# Patient Record
Sex: FEMALE | Race: BLACK OR AFRICAN AMERICAN | Employment: OTHER | URBAN - METROPOLITAN AREA
[De-identification: names, ages, dates, MRNs, and addresses within clinical notes are randomized per-mention and may not be internally consistent; named-entity substitution may affect disease eponyms.]

---

## 2017-01-09 DIAGNOSIS — I25.10 CORONARY ARTERY DISEASE INVOLVING NATIVE CORONARY ARTERY OF NATIVE HEART WITHOUT ANGINA PECTORIS: Primary | ICD-10-CM

## 2017-02-20 ENCOUNTER — TELEPHONE (OUTPATIENT)
Dept: CARDIOLOGY | Facility: CLINIC | Age: 52
End: 2017-02-20

## 2017-02-20 NOTE — TELEPHONE ENCOUNTER
"Patient called asking for alternative for   Amlodipine-Olmesartan (DAYAMI) 5-40 MG TABS Take 1 tablet by mouth daily   Patient contacted PMD office and per patient she was \"told to see cardiology first to have alternative recommended for her BP. \"   Possible recommendation per patient could be valsartan or candesartan.   Patient will be seeing PMD tomorrow  Patient has annual OV scheduled 4-3-17 with Dr. Tobias.   Will message Dr. Tobias.   "

## 2017-02-22 ENCOUNTER — TELEPHONE (OUTPATIENT)
Dept: CARDIOLOGY | Facility: CLINIC | Age: 52
End: 2017-02-22

## 2017-02-22 DIAGNOSIS — I10 BENIGN ESSENTIAL HYPERTENSION: Primary | ICD-10-CM

## 2017-02-22 RX ORDER — AMLODIPINE BESYLATE 5 MG/1
5 TABLET ORAL DAILY
Qty: 30 TABLET | Refills: 3 | Status: SHIPPED | OUTPATIENT
Start: 2017-02-22 | End: 2017-04-18

## 2017-02-22 RX ORDER — LOSARTAN POTASSIUM 100 MG/1
100 TABLET ORAL DAILY
Qty: 30 TABLET | Refills: 3 | Status: SHIPPED | OUTPATIENT
Start: 2017-02-22 | End: 2017-06-27

## 2017-02-22 NOTE — TELEPHONE ENCOUNTER
"Message from Dr. Tobias \"Lets switch her to amlodipine 5 MG daily and losartan 100 MG daily\"    Contacted patient to review Dr. Tobias's recommendation for alternative medications for Elliot. Patient needed an alternative as insurance would not cover the elliot. Patient preferred 30 days for quantity and would like to move her OV from 2/23/17 to a 30 day visit to review BP management.  Message to scheduling to contact patient for OV.  "

## 2017-03-30 ENCOUNTER — PRE VISIT (OUTPATIENT)
Dept: CARDIOLOGY | Facility: CLINIC | Age: 52
End: 2017-03-30

## 2017-04-03 ENCOUNTER — APPOINTMENT (OUTPATIENT)
Dept: GENERAL RADIOLOGY | Facility: CLINIC | Age: 52
End: 2017-04-03
Attending: EMERGENCY MEDICINE
Payer: MEDICARE

## 2017-04-03 ENCOUNTER — HOSPITAL ENCOUNTER (EMERGENCY)
Facility: CLINIC | Age: 52
Discharge: HOME | End: 2017-04-03
Attending: EMERGENCY MEDICINE | Admitting: EMERGENCY MEDICINE
Payer: MEDICARE

## 2017-04-03 VITALS
RESPIRATION RATE: 16 BRPM | WEIGHT: 203 LBS | HEART RATE: 75 BPM | OXYGEN SATURATION: 97 % | TEMPERATURE: 99.4 F | SYSTOLIC BLOOD PRESSURE: 140 MMHG | DIASTOLIC BLOOD PRESSURE: 88 MMHG | BODY MASS INDEX: 34.66 KG/M2 | HEIGHT: 64 IN

## 2017-04-03 DIAGNOSIS — R07.89 ATYPICAL CHEST PAIN: ICD-10-CM

## 2017-04-03 DIAGNOSIS — K21.9 GASTROESOPHAGEAL REFLUX DISEASE WITHOUT ESOPHAGITIS: ICD-10-CM

## 2017-04-03 LAB
ANION GAP SERPL CALCULATED.3IONS-SCNC: 8 MMOL/L (ref 3–14)
BASOPHILS # BLD AUTO: 0 10E9/L (ref 0–0.2)
BASOPHILS NFR BLD AUTO: 0.5 %
BUN SERPL-MCNC: 7 MG/DL (ref 7–30)
CALCIUM SERPL-MCNC: 9.2 MG/DL (ref 8.5–10.1)
CHLORIDE SERPL-SCNC: 105 MMOL/L (ref 94–109)
CO2 SERPL-SCNC: 27 MMOL/L (ref 20–32)
CREAT SERPL-MCNC: 0.71 MG/DL (ref 0.52–1.04)
DIFFERENTIAL METHOD BLD: NORMAL
EOSINOPHIL # BLD AUTO: 0.5 10E9/L (ref 0–0.7)
EOSINOPHIL NFR BLD AUTO: 6 %
ERYTHROCYTE [DISTWIDTH] IN BLOOD BY AUTOMATED COUNT: 12.7 % (ref 10–15)
GFR SERPL CREATININE-BSD FRML MDRD: 86 ML/MIN/1.7M2
GLUCOSE SERPL-MCNC: 91 MG/DL (ref 70–99)
HCT VFR BLD AUTO: 42.1 % (ref 35–47)
HGB BLD-MCNC: 14.1 G/DL (ref 11.7–15.7)
IMM GRANULOCYTES # BLD: 0 10E9/L (ref 0–0.4)
IMM GRANULOCYTES NFR BLD: 0.3 %
INTERPRETATION ECG - MUSE: NORMAL
LYMPHOCYTES # BLD AUTO: 2.6 10E9/L (ref 0.8–5.3)
LYMPHOCYTES NFR BLD AUTO: 34.8 %
MCH RBC QN AUTO: 28.4 PG (ref 26.5–33)
MCHC RBC AUTO-ENTMCNC: 33.5 G/DL (ref 31.5–36.5)
MCV RBC AUTO: 85 FL (ref 78–100)
MONOCYTES # BLD AUTO: 0.6 10E9/L (ref 0–1.3)
MONOCYTES NFR BLD AUTO: 7.3 %
NEUTROPHILS # BLD AUTO: 3.8 10E9/L (ref 1.6–8.3)
NEUTROPHILS NFR BLD AUTO: 51.1 %
NRBC # BLD AUTO: 0 10*3/UL
NRBC BLD AUTO-RTO: 0 /100
NT-PROBNP SERPL-MCNC: 43 PG/ML (ref 0–900)
PLATELET # BLD AUTO: 315 10E9/L (ref 150–450)
POTASSIUM SERPL-SCNC: 3.6 MMOL/L (ref 3.4–5.3)
RBC # BLD AUTO: 4.97 10E12/L (ref 3.8–5.2)
SODIUM SERPL-SCNC: 140 MMOL/L (ref 133–144)
TROPONIN I SERPL-MCNC: NORMAL UG/L (ref 0–0.04)
WBC # BLD AUTO: 7.5 10E9/L (ref 4–11)

## 2017-04-03 PROCEDURE — 25000132 ZZH RX MED GY IP 250 OP 250 PS 637: Mod: GY | Performed by: EMERGENCY MEDICINE

## 2017-04-03 PROCEDURE — 83880 ASSAY OF NATRIURETIC PEPTIDE: CPT | Performed by: EMERGENCY MEDICINE

## 2017-04-03 PROCEDURE — 99285 EMERGENCY DEPT VISIT HI MDM: CPT | Mod: 25

## 2017-04-03 PROCEDURE — 84484 ASSAY OF TROPONIN QUANT: CPT | Performed by: EMERGENCY MEDICINE

## 2017-04-03 PROCEDURE — 25000125 ZZHC RX 250: Performed by: EMERGENCY MEDICINE

## 2017-04-03 PROCEDURE — 85025 COMPLETE CBC W/AUTO DIFF WBC: CPT | Performed by: EMERGENCY MEDICINE

## 2017-04-03 PROCEDURE — A9270 NON-COVERED ITEM OR SERVICE: HCPCS | Mod: GY | Performed by: EMERGENCY MEDICINE

## 2017-04-03 PROCEDURE — 71020 XR CHEST 2 VW: CPT

## 2017-04-03 PROCEDURE — 80048 BASIC METABOLIC PNL TOTAL CA: CPT | Performed by: EMERGENCY MEDICINE

## 2017-04-03 PROCEDURE — 93005 ELECTROCARDIOGRAM TRACING: CPT

## 2017-04-03 RX ORDER — NITROGLYCERIN 0.4 MG/1
0.4 TABLET SUBLINGUAL
Status: DISCONTINUED | OUTPATIENT
Start: 2017-04-03 | End: 2017-04-03 | Stop reason: HOSPADM

## 2017-04-03 RX ORDER — LIDOCAINE 40 MG/G
CREAM TOPICAL
Status: DISCONTINUED | OUTPATIENT
Start: 2017-04-03 | End: 2017-04-03 | Stop reason: HOSPADM

## 2017-04-03 RX ORDER — ASPIRIN 81 MG/1
324 TABLET, CHEWABLE ORAL ONCE
Status: COMPLETED | OUTPATIENT
Start: 2017-04-03 | End: 2017-04-03

## 2017-04-03 RX ORDER — ALUMINA, MAGNESIA, AND SIMETHICONE 2400; 2400; 240 MG/30ML; MG/30ML; MG/30ML
15 SUSPENSION ORAL ONCE
Status: COMPLETED | OUTPATIENT
Start: 2017-04-03 | End: 2017-04-03

## 2017-04-03 RX ADMIN — ASPIRIN 81 MG 324 MG: 81 TABLET ORAL at 10:09

## 2017-04-03 RX ADMIN — LIDOCAINE HYDROCHLORIDE 15 ML: 20 SOLUTION ORAL; TOPICAL at 10:08

## 2017-04-03 RX ADMIN — ALUMINUM HYDROXIDE, MAGNESIUM HYDROXIDE, AND DIMETHICONE 15 ML: 400; 400; 40 SUSPENSION ORAL at 10:08

## 2017-04-03 ASSESSMENT — ENCOUNTER SYMPTOMS
COUGH: 1
SHORTNESS OF BREATH: 1
CHILLS: 0
FEVER: 0
NAUSEA: 0
VOMITING: 0

## 2017-04-03 NOTE — ED AVS SNAPSHOT
Emergency Department    [address] [facility] [address]    Phone:  [phone]    Fax:  [phone]                                       Glenis Ni   MRN: [id number]    Department:   Emergency Department   Date of Visit:  4/3/2017           After Visit Summary Signature Page     I have received my discharge instructions, and my questions have been answered. I have discussed any challenges I see with this plan with the nurse or doctor.    ..........................................................................................................................................  Patient/Patient Representative Signature      ..........................................................................................................................................  Patient Representative Print Name and Relationship to Patient    ..................................................               ................................................  Date                                            Time    ..........................................................................................................................................  Reviewed by Signature/Title    ...................................................              ..............................................  Date                                                            Time

## 2017-04-03 NOTE — DISCHARGE INSTRUCTIONS
Prilosec 2 times a day, liquid Maalox during the day as needed.  Recheck in the clinic in 2 days, return to the ER sooner if you develop severe chest heaviness with shortness of breath and sweating.    Discharge Instructions  Chest Pain    You have been seen today for chest pain or discomfort.  At this time, your doctor has found no signs that your chest pain is due to a serious or life-threatening condition, (or you have declined more testing and/or admission to the hospital). However, sometimes there is a serious problem that does not show up right away. Your evaluation today may not be complete and you may need further testing and evaluation.     You need to follow-up with your regular doctor within 3 days.    Return to the Emergency Department if:    Your chest pain changes, gets worse, starts to happen more often, or comes with less activity.    You are short of breath.    You get very weak or tired.    You pass out or faint.    You have any new symptoms, like fever, cough, numb legs, or you cough up blood.    You have anything else that worries you.    Until you follow-up with your regular doctor please do the following:    Take one aspirin daily unless you have an allergy or are told not to by your doctor.    If a stress test appointment has been made, go to the appointment.    If you have questions, contact your regular doctor.    If your doctor today has told you to follow-up with your regular doctor, it is very important that you make an appointment with your clinic and go to the appointment.  If you do not follow-up with your primary doctor, it may result in missing an important development which could result in permanent injury or disability and/or lasting pain.  If there is any problem keeping your appointment, call your doctor or return to the Emergency Department.    If you were given a prescription for medicine here today, be sure to read all of the information (including the package insert) that  comes with your prescription.  This will include important information about the medicine, its side effects, and any warnings that you need to know about.  The pharmacist who fills the prescription can provide more information and answer questions you may have about the medicine.  If you have questions or concerns that the pharmacist cannot address, please call or return to the Emergency Department.     Opioid Medication Information    Pain medications are among the most commonly prescribed medicines, so we are including this information for all our patients. If you did not receive pain medication or get a prescription for pain medicine, you can ignore it.     You may have been given a prescription for an opioid (narcotic) pain medicine and/or have received a pain medicine while here in the Emergency Department. These medicines can make you drowsy or impaired. You must not drive, operate dangerous equipment, or engage in any other dangerous activities while taking these medications. If you drive while taking these medications, you could be arrested for DUI, or driving under the influence. Do not drink any alcohol while you are taking these medications.     Opioid pain medications can cause addiction. If you have a history of chemical dependency of any type, you are at a higher risk of becoming addicted to pain medications.  Only take these prescribed medications to treat your pain when all other options have been tried. Take it for as short a time and as few doses as possible. Store your pain pills in a secure place, as they are frequently stolen and provide a dangerous opportunity for children or visitors in your house to start abusing these powerful medications. We will not replace any lost or stolen medicine.  As soon as your pain is better, you should flush all your remaining medication.     Many prescription pain medications contain Tylenol  (acetaminophen), including Vicodin , Tylenol #3 , Norco , Lortab , and  Percocet .  You should not take any extra pills of Tylenol  if you are using these prescription medications or you can get very sick.  Do not ever take more than 3000 mg of acetaminophen in any 24 hour period.    All opioids tend to cause constipation. Drink plenty of water and eat foods that have a lot of fiber, such as fruits, vegetables, prune juice, apple juice and high fiber cereal.  Take a laxative if you don t move your bowels at least every other day. Miralax , Milk of Magnesia, Colace , or Senna  can be used to keep you regular.      Remember that you can always come back to the Emergency Department if you are not able to see your regular doctor in the amount of time listed above, if you get any new symptoms, or if there is anything that worries you.          What Is Acid Reflux?    Do you have to clear your throat or cough often? Are you hoarse? Do you have difficulty swallowing? If you have these or other throat symptoms, you may have acid reflux (when stomach acid washes up and irritates your throat).  Why You Have Throat Symptoms  At both ends of the esophagus (the tube that carries food to the stomach) are the esophageal sphincters. These muscles relax to let food pass, then tighten to keep stomach acid down. When the lower esophageal sphincter (LES) doesn t tighten enough, acid can reflux from the stomach into the esophagus. This may cause heartburn. If the upper esophageal sphincter (UES) also doesn t work well, acid can travel higher and enter your throat (pharynx). In many cases, this causes throat symptoms.  Common Throat Symptoms    Frequent need to clear your throat    Feeling like you re choking    Chronic cough    Hoarseness    Trouble swallowing    Sensation of having  a lump in the throat     Sour or acid taste    Recurrent sore throat     4080-5633 [employer]. [address] [place] [address], [place] [address]. All rights reserved. This information is not intended as a substitute for  professional medical care. Always follow your healthcare professional's instructions.

## 2017-04-03 NOTE — ED PROVIDER NOTES
"  History     Chief Complaint:  Shortness of breath and chest tightness     HPI   Glenis Ni is a 51 year old female with history of coronary artery disease w/ stenting who presents to the ED for evaluation of shortness of breath.     The patient states that about 4 days ago on Thursday 03/30/2017 she developed a sensation of numbness to her left neck, shoulder, and left arm pain which resolved completley after taking nitroglycerin and falling asleep. Since that time, she has been experiencing a sensation of reflux with \"acid coming into [her] throat,\" though this is not unusual for her and improved after apple cider vinegar. She had otherwise been feeling well, however yesterday evening where she began to feel short of breath with associated chest tightness and dry cough. She initially thought this was her asthma, but her rescue inhaler did not improve her symptoms. Her symptoms have persisted at a constant level for about the past 12 hours and given their chronicity, she presents for assessment. The patient denies any fever, chills, lightheadedness, diaphoresis, nausea, vomiting, or new leg swelling or pain.    Cardiac Risk Factors:  SEX:              female  Tobacco:              Negative  Hypertension:       Positive  Diabetes:             Negative  Lipids:                Positive  Personal history:  Positive  Family History:       Negative    PE and DVT Risk Factors:  Personal hx of PE/DVT:  Negative  Family hx of PE/DVT:   Negative  Recent travel:    Negative  Recent surgery:   Negative  Other immobilizations:  Negative  Hx cancer:    Negative  Hormone use:   Negative    Allergies:  Imdur  Morphine sulfate, nausea and vomiting      Medications:    Norvasc  Cozaar  Aldactone  Crestor  Metoprolol LX    Past Medical History:    Asthma  CAD  Fibromuscular dysplasia  Hyperlipidemia  Hypertension  MI  PVD    Past Surgical History:    Colonoscopy  Partial hysterectomy     Family History:    No past pertinent " "family history.     Social History:  Former smoker  Positive for alcohol use.    Marital Status:  Single [1]    Review of Systems   Constitutional: Negative for chills and fever.   Respiratory: Positive for cough and shortness of breath.    Cardiovascular: Positive for chest pain (\"tightness\").   Gastrointestinal: Negative for nausea and vomiting.   All other systems reviewed and are negative.    Physical Exam   First Vitals:  BP: (!) 160/92  Pulse: 75  Temp: 99.4  F (37.4  C)  Resp: 20  Height: 162.6 cm (5' 4\")  Weight: 92.1 kg (203 lb)  SpO2: 98 %      Physical Exam  Nursing note and vitals reviewed.  Constitutional:  Appears well-developed and well-nourished, comfortable, obese.   HENT:   Head:   No evidence of facial or scalp trauma.  Nose:    Nose normal.   Mouth/Throat:  Mucosa is moist.  Eyes:    Conjunctivae are normal.      Pupils are equal, round, and reactive to light.      Right eye exhibits no discharge. Left eye exhibits no discharge.      No scleral icterus.   Cardiovascular:  Normal rate, regular rhythm.      Normal heart sounds and intact distal pulses.       No murmur heard.  Pulmonary/Chest:  Effort normal and breath sounds normal. No respiratory distress.     No wheezes. No rales. No chest wall tenderness. No stridor.   Abdominal:   Soft. No distension and no mass. No tenderness.      No rebound and no guarding. No flank pain.  Musculoskeletal:  Normal range of motion.      Trace pretibial edema and no tenderness.                                       Neck supple, no midline cervical tenderness.   Neurological:   Alert and oriented to person, place, and year.      No cranial nerve deficit.      Exhibits normal muscle tone. Coordination normal.      GCS eye subscore is 4. GCS verbal subscore is 5.      GCS motor subscore is 6.   Skin:    Skin is warm and dry. No rash noted. No diaphoresis.      No erythema. No pallor.   Psychiatric:   Behavior is normal. Judgment and thought content normal. "       Emergency Department Course   ECG:  Indication: shortness of breath   Time: 0945  Vent. Rate 72 bpm. WA interval 176. QRS duration 120. QT/QTc 404/442. P-R-T axis 65 31 24. normal sinus rhythm. right  bundle branch block. Abnormal ECG. No significant change compared to EKG dated 06/24/2014. Read time: 0950     Imaging:  Radiographic findings were communicated with the patient who voiced understanding of the findings.    XR Chest 2 views:   The lungs are clear. No focal pulmonary opacities. Heart  and mediastinum are unremarkable. No acute cardiopulmonary  abnormalities. As per radiology.     Laboratory:  CBC: WBC: 7.5, HGB: 14.1, PLT: 315  BMP: AWNL (Creatinine: 0.71)     Troponin: <0.015  Pro-BNP: 43    Interventions:  1008 GI Cocktail (Maalox/Mylanta and viscous Lidocaine), 30 mL suspension, PO   1009 Aspirin 324 mg PO    Emergency Department Course:  Nursing notes and vitals reviewed. I performed an exam of the patient as documented above.    EKG obtained in the ED, see results above.     IV inserted and blood drawn. The patient was placed on continuous blood pressure monitoring and pulse oximetry.     The patient was sent for a chest xray while in the emergency department, findings above.     1116 I reevaluated the patient and provided an update in regards to her ED course. She states that her symptoms have almost completely resolved after the GI cocktail.     Findings and plan explained to the Patient. Patient discharged home with instructions regarding supportive care, medications, and reasons to return. The importance of close follow-up was reviewed.     I personally reviewed the laboratory results with the Patient and answered all related questions prior to discharge.      Impression & Plan    Medical Decision Making:  Glenis Ni is a 51 year old female with a history of coronary artery disease who comes in with atypical symptoms. 2 days ago she had a lot of reflux and heart burn. Last night and  this morning, she had some pain in the same area in the center of her chest and mild shortness of breath. Her exam here is completley normal. EKG showed no change from previous EKG. Her basic metabolic panel and CBC were normal, in addition to a troponin less than 0.015 and normal BNP. Chest xray did not reveal any widened mediastinum or evidence of aneurysm, no heart failure and was otherwise normal. She was provided a GI cocktail which completley resolved her 1 out of 2 symptoms. She had reflux symptoms a few days ago and I believe that is what this is secondary to. However, with her history of coronary artery disease, I want her seen in the clinic in the next couple of days. At any time, should she become worse despite omeprazole and liquid antacid therapy; she should return to the ED.     Diagnosis:    ICD-10-CM    1. Atypical chest pain R07.89    2. Gastroesophageal reflux disease without esophagitis K21.9      Plan:  Prilosec 2 times a day, liquid Maalox during the day as needed. Recheck in the clinic in 2 days, return to the ER sooner if you develop severe chest heaviness with shortness of breath and sweating.    Discharge Medications:  New Prescriptions    OMEPRAZOLE (PRILOSEC) 20 MG CR CAPSULE    Take 1 capsule (20 mg) by mouth 2 times daily     IZhen, am serving as a scribe on 4/3/2017 at 9:55 AM to personally document services performed by Marissa Neal MD based on my observations and the provider's statements to me.     Zhen Beltran  4/3/2017    EMERGENCY DEPARTMENT     Marissa Neal MD  04/03/17 [id number]

## 2017-04-05 ENCOUNTER — TRANSFERRED RECORDS (OUTPATIENT)
Dept: CARDIOLOGY | Facility: CLINIC | Age: 52
End: 2017-04-05

## 2017-04-06 PROBLEM — I10 BENIGN ESSENTIAL HYPERTENSION: Status: ACTIVE | Noted: 2017-04-06

## 2017-04-18 ENCOUNTER — OFFICE VISIT (OUTPATIENT)
Dept: CARDIOLOGY | Facility: CLINIC | Age: 52
End: 2017-04-18
Payer: MEDICARE

## 2017-04-18 VITALS
SYSTOLIC BLOOD PRESSURE: 163 MMHG | BODY MASS INDEX: 36.55 KG/M2 | HEART RATE: 58 BPM | HEIGHT: 64 IN | DIASTOLIC BLOOD PRESSURE: 98 MMHG | WEIGHT: 214.1 LBS

## 2017-04-18 DIAGNOSIS — I25.10 CORONARY ARTERY DISEASE INVOLVING NATIVE CORONARY ARTERY OF NATIVE HEART WITHOUT ANGINA PECTORIS: ICD-10-CM

## 2017-04-18 DIAGNOSIS — I10 BENIGN ESSENTIAL HYPERTENSION: Primary | ICD-10-CM

## 2017-04-18 DIAGNOSIS — E78.2 MIXED HYPERLIPIDEMIA: ICD-10-CM

## 2017-04-18 DIAGNOSIS — I10 BENIGN ESSENTIAL HYPERTENSION: ICD-10-CM

## 2017-04-18 DIAGNOSIS — I73.9 PERIPHERAL VASCULAR DISEASE (H): ICD-10-CM

## 2017-04-18 LAB
ALT SERPL W P-5'-P-CCNC: <5 U/L (ref 5–30)
ANION GAP SERPL CALCULATED.3IONS-SCNC: 8.4 MMOL/L (ref 6–17)
BUN SERPL-MCNC: 10 MG/DL (ref 7–30)
CALCIUM SERPL-MCNC: 9.3 MG/DL (ref 8.5–10.5)
CHLORIDE SERPL-SCNC: 104 MMOL/L (ref 98–107)
CHOLEST SERPL-MCNC: 134 MG/DL
CO2 SERPL-SCNC: 30 MMOL/L (ref 23–29)
CREAT SERPL-MCNC: 0.92 MG/DL (ref 0.7–1.3)
GFR SERPL CREATININE-BSD FRML MDRD: 64 ML/MIN/1.7M2
GLUCOSE SERPL-MCNC: 89 MG/DL (ref 70–105)
HDLC SERPL-MCNC: 38 MG/DL
LDLC SERPL CALC-MCNC: 69 MG/DL
NONHDLC SERPL-MCNC: 96 MG/DL
POTASSIUM SERPL-SCNC: 3.4 MMOL/L (ref 3.5–5.1)
SODIUM SERPL-SCNC: 139 MMOL/L (ref 136–145)
TRIGL SERPL-MCNC: 135 MG/DL

## 2017-04-18 PROCEDURE — 80061 LIPID PANEL: CPT | Performed by: INTERNAL MEDICINE

## 2017-04-18 PROCEDURE — 84460 ALANINE AMINO (ALT) (SGPT): CPT | Performed by: INTERNAL MEDICINE

## 2017-04-18 PROCEDURE — 99214 OFFICE O/P EST MOD 30 MIN: CPT | Performed by: INTERNAL MEDICINE

## 2017-04-18 PROCEDURE — 36415 COLL VENOUS BLD VENIPUNCTURE: CPT | Performed by: INTERNAL MEDICINE

## 2017-04-18 PROCEDURE — 80048 BASIC METABOLIC PNL TOTAL CA: CPT | Performed by: INTERNAL MEDICINE

## 2017-04-18 RX ORDER — NITROGLYCERIN 0.4 MG/1
0.4 TABLET SUBLINGUAL EVERY 5 MIN PRN
Qty: 25 TABLET | Refills: 3 | Status: SHIPPED | OUTPATIENT
Start: 2017-04-18 | End: 2019-01-21

## 2017-04-18 RX ORDER — ROSUVASTATIN CALCIUM 20 MG/1
20 TABLET, COATED ORAL DAILY
COMMUNITY
End: 2018-09-17

## 2017-04-18 RX ORDER — AMLODIPINE BESYLATE 10 MG/1
10 TABLET ORAL DAILY
Qty: 90 TABLET | Refills: 3 | Status: SHIPPED | OUTPATIENT
Start: 2017-04-18 | End: 2018-04-24

## 2017-04-18 NOTE — LETTER
4/18/2017    Ruddy Carter MD  [facility]   [address] [place] [address]    RE: Glenis Last       Dear Colleague,    I had the pleasure of seeing Ms. Ni in followup at the [facility] today.  She is a very pleasant 51-year-old female who I last saw in 03/2016.  She has a history of coronary artery disease and is status post angioplasty and stenting of a 95% LAD lesion in 08/2011.  She also has a history of fibromuscular dysplasia of the renal arteries and has undergone PTCA in the past.      She last underwent repeat coronary angiography in 06/2014 which demonstrated no significant obstructive coronary disease.  Due to concerns about thoracic outlet syndrome, she also underwent a workup by Dr. Taveras, which was unrevealing.  Her symptoms of left arm discomfort resolved with physical therapy.      She was seen at the Emergency Department at [facility] on 04/03/2017 for shortness of breath as well as a recurrent left arm numbness and tingling.  She also was experiencing GERD-like symptoms which were particularly too troublesome at night.  Her evaluation in the Emergency Department was essentially unremarkable.  This included an EKG, chest x-ray and troponins.  She was then seen at the [facility] on 04/05/2017 with recurrent symptoms.  Once again, her evaluation was essentially unremarkable, although she was noted to be hypertensive with systolic blood pressures in 160s.      Today, she presents feeling well overall from a cardiac standpoint.  She does exercise regularly, approximately 3-4 times a week and does not notice the chest discomfort or shortness of breath while on the treadmill.  Having said that, the symptoms do tend to occur while she is walking.  She is also experiencing occasional left arm discomfort in association with these symptoms.      She has been taking all her medications as prescribed, but states she  has been out of at least 1 of her antihypertensives over the past 2 weeks.      PHYSICAL EXAMINATION:   GENERAL:  She was alert.  Her blood pressure today was 163/98 with a pulse of 58.   NECK:  Revealed no jugular venous distention or carotid bruits.   CHEST:  Clear to auscultation.   CARDIOVASCULAR:  Regular rhythm, no murmurs appreciated.   ABDOMEN:  Soft, nontender.   EXTREMITIES:  Demonstrated no edema.      LABORATORY DATA:  Lipids on 04/18/2017 demonstrated total cholesterol 134, HDL 38, LDL of 69, triglycerides of 135.     Outpatient Encounter Prescriptions as of 4/18/2017   Medication Sig Dispense Refill     rosuvastatin (CRESTOR) 20 MG tablet Take 20 mg by mouth daily       nitroglycerin (NITROSTAT) 0.4 MG sublingual tablet Place 1 tablet (0.4 mg) under the tongue every 5 minutes as needed for chest pain 25 tablet 3     amLODIPine (NORVASC) 10 MG tablet Take 1 tablet (10 mg) by mouth daily 90 tablet 3     [DISCONTINUED] losartan (COZAAR) 100 MG tablet Take 1 tablet (100 mg) by mouth daily 30 tablet 3     ASPIRIN PO Take 325 mg by mouth daily       metoprolol (TOPROL-XL) 25 MG 24 hr tablet Take 25 mg by mouth daily.       [DISCONTINUED] omeprazole (PRILOSEC) 20 MG CR capsule Take 1 capsule (20 mg) by mouth 2 times daily 20 capsule 0     [DISCONTINUED] amLODIPine (NORVASC) 5 MG tablet Take 1 tablet (5 mg) by mouth daily 30 tablet 3     spironolactone (ALDACTONE) 25 MG tablet Take 1 tablet (25 mg) by mouth daily 90 tablet 3     [DISCONTINUED] nitroglycerin (NITROSTAT) 0.4 MG SL tablet Place 1 tablet (0.4 mg) under the tongue every 5 minutes as needed for chest pain 25 tablet 3     [DISCONTINUED] Rosuvastatin Calcium (CRESTOR PO) Take 20 mg by mouth daily       No facility-administered encounter medications on file as of 4/18/2017.       IMPRESSION:   1.  Coronary artery disease, status post drug-eluting stent placement to the mid LAD in 2011.  Angiography in 2014 demonstrated a patent stent to the LAD with no  evidence of obstructive disease elsewhere.   2.  History of renal fibromuscular dysplasia, status post stenting.   3.  Exertional dyspnea and chest/left arm discomfort which is reminiscent of her prior episodes of angina, although an angiogram to work up these symptoms in 2014 was negative.   4.  Hypertension, which appears to be poorly controlled recently.     5.  Dyslipidemia.      Ms. Ni presents for followup with symptoms of exertional dyspnea and left arm discomfort.  Given her significant history of coronary artery disease, I think that further evaluation with a stress nuclear perfusion study would be reasonable.  In addition, her blood pressure control needs to be optimized further and I have increased amlodipine to 10 mg daily.  I have also ordered a repeat renal ultrasound to evaluate for recurrent stenosis.      I will have her follow up in about 1 month to reassess the adequacy of blood pressure control at this point.  Obviously, if the stress nuclear perfusion study identifies significant abnormalities, we will arrange followup in a more expeditious fashion.      It was a pleasure seeing her in followup today.     Sincerely,    Mica Tobias MD     [facility]

## 2017-04-18 NOTE — PROGRESS NOTES
HISTORY OF PRESENT ILLNESS:  I had the pleasure of seeing Ms. Ni in followup at the [facility] today.  She is a very pleasant 51-year-old female who I last saw in 03/2016.  She has a history of coronary artery disease and is status post angioplasty and stenting of a 95% LAD lesion in 08/2011.  She also has a history of fibromuscular dysplasia of the renal arteries and has undergone PTCA in the past.      She last underwent repeat coronary angiography in 06/2014 which demonstrated no significant obstructive coronary disease.  Due to concerns about thoracic outlet syndrome, she also underwent a workup by Dr. Taveras, which was unrevealing.  Her symptoms of left arm discomfort resolved with physical therapy.      She was seen at the Emergency Department at [facility] on 04/03/2017 for shortness of breath as well as a recurrent left arm numbness and tingling.  She also was experiencing GERD-like symptoms which were particularly too troublesome at night.  Her evaluation in the Emergency Department was essentially unremarkable.  This included an EKG, chest x-ray and troponins.  She was then seen at the [facility] on 04/05/2017 with recurrent symptoms.  Once again, her evaluation was essentially unremarkable, although she was noted to be hypertensive with systolic blood pressures in 160s.      Today, she presents feeling well overall from a cardiac standpoint.  She does exercise regularly, approximately 3-4 times a week and does not notice the chest discomfort or shortness of breath while on the treadmill.  Having said that, the symptoms do tend to occur while she is walking.  She is also experiencing occasional left arm discomfort in association with these symptoms.      She has been taking all her medications as prescribed, but states she has been out of at least 1 of her antihypertensives over the past 2 weeks.      PHYSICAL EXAMINATION:   GENERAL:  She was  alert.  Her blood pressure today was 163/98 with a pulse of 58.   NECK:  Revealed no jugular venous distention or carotid bruits.   CHEST:  Clear to auscultation.   CARDIOVASCULAR:  Regular rhythm, no murmurs appreciated.   ABDOMEN:  Soft, nontender.   EXTREMITIES:  Demonstrated no edema.      LABORATORY DATA:  Lipids on 2017 demonstrated total cholesterol 134, HDL 38, LDL of 69, triglycerides of 135.      IMPRESSION:   1.  Coronary artery disease, status post drug-eluting stent placement to the mid LAD in .  Angiography in  demonstrated a patent stent to the LAD with no evidence of obstructive disease elsewhere.   2.  History of renal fibromuscular dysplasia, status post stenting.   3.  Exertional dyspnea and chest/left arm discomfort which is reminiscent of her prior episodes of angina, although an angiogram to work up these symptoms in  was negative.   4.  Hypertension, which appears to be poorly controlled recently.     5.  Dyslipidemia.      Ms. Ni presents for followup with symptoms of exertional dyspnea and left arm discomfort.  Given her significant history of coronary artery disease, I think that further evaluation with a stress nuclear perfusion study would be reasonable.  In addition, her blood pressure control needs to be optimized further and I have increased amlodipine to 10 mg daily.  I have also ordered a repeat renal ultrasound to evaluate for recurrent stenosis.      I will have her follow up in about 1 month to reassess the adequacy of blood pressure control at this point.  Obviously, if the stress nuclear perfusion study identifies significant abnormalities, we will arrange followup in a more expeditious fashion.      It was a pleasure seeing her in followup today.         ROSALINA SINGH MD             D: 2017 09:56   T: 2017 16:16   MT: DOUG      Name:     JOCELYN NI   MRN:      [id number]        Account:      [id number]   :      1965            Service Date: 04/18/2017      Document: [id number]

## 2017-04-18 NOTE — PROGRESS NOTES
HPI and Plan:   See dictation    Orders Placed This Encounter   Procedures     NM Exercise stress test (nuc card)     US Renal Complete w Doppler Complete     Follow-Up with Cardiac Advanced Practice Provider       Orders Placed This Encounter   Medications     rosuvastatin (CRESTOR) 20 MG tablet     Sig: Take 20 mg by mouth daily     nitroglycerin (NITROSTAT) 0.4 MG sublingual tablet     Sig: Place 1 tablet (0.4 mg) under the tongue every 5 minutes as needed for chest pain     Dispense:  25 tablet     Refill:  3     amLODIPine (NORVASC) 10 MG tablet     Sig: Take 1 tablet (10 mg) by mouth daily     Dispense:  90 tablet     Refill:  3       Medications Discontinued During This Encounter   Medication Reason     Rosuvastatin Calcium (CRESTOR PO) Medication Reconciliation Clean Up     omeprazole (PRILOSEC) 20 MG CR capsule Medication Reconciliation Clean Up     nitroglycerin (NITROSTAT) 0.4 MG SL tablet Reorder     amLODIPine (NORVASC) 5 MG tablet Reorder         Encounter Diagnoses   Name Primary?     Coronary artery disease involving native coronary artery of native heart without angina pectoris      Benign essential hypertension Yes     Peripheral vascular disease (H)      Mixed hyperlipidemia        CURRENT MEDICATIONS:  Current Outpatient Prescriptions   Medication Sig Dispense Refill     rosuvastatin (CRESTOR) 20 MG tablet Take 20 mg by mouth daily       nitroglycerin (NITROSTAT) 0.4 MG sublingual tablet Place 1 tablet (0.4 mg) under the tongue every 5 minutes as needed for chest pain 25 tablet 3     amLODIPine (NORVASC) 10 MG tablet Take 1 tablet (10 mg) by mouth daily 90 tablet 3     losartan (COZAAR) 100 MG tablet Take 1 tablet (100 mg) by mouth daily 30 tablet 3     ASPIRIN PO Take 325 mg by mouth daily       metoprolol (TOPROL-XL) 25 MG 24 hr tablet Take 25 mg by mouth daily.       [DISCONTINUED] amLODIPine (NORVASC) 5 MG tablet Take 1 tablet (5 mg) by mouth daily 30 tablet 3     spironolactone (ALDACTONE)  25 MG tablet Take 1 tablet (25 mg) by mouth daily 90 tablet 3     [DISCONTINUED] nitroglycerin (NITROSTAT) 0.4 MG SL tablet Place 1 tablet (0.4 mg) under the tongue every 5 minutes as needed for chest pain 25 tablet 3       ALLERGIES     Allergies   Allergen Reactions     Imdur [Isosorbide] Other (See Comments)     Headache.  Patient took 30mg dose for 2 days and had HA on consecutive days.     Morphine Sulfate Nausea and Vomiting       PAST MEDICAL HISTORY:  Past Medical History:   Diagnosis Date     Asthma      Coronary artery disease     cardiac cath 2011: JUDY to LAD and 1st diagonal     Fibromuscular dysplasia (H)      GERD (gastroesophageal reflux disease)      Hyperlipidemia LDL goal <70      Hypertension      MI (myocardial infarction) (H) 8/11     Peripheral vascular disease (H) 7/12    angioplasty bilat renal arteries     Renal artery stenosis (H)     Bilateral moderate renal stenosis noted on 12/20/13 renal ultrasound     Sleep apnea     mild per sleep study [facility] 7-10-15       PAST SURGICAL HISTORY:  Past Surgical History:   Procedure Laterality Date     COLONOSCOPY N/A 1/13/2015    Procedure: COLONOSCOPY;  Surgeon: Juan Mgiuel Garcia MD;  Location:  GI     GYN SURGERY       HYSTERECTOMY      partial       FAMILY HISTORY:  Family History   Problem Relation Age of Onset     Coronary Artery Disease No family hx of        SOCIAL HISTORY:  Social History     Social History     Marital status: Single     Spouse name: N/A     Number of children: N/A     Years of education: N/A     Social History Main Topics     Smoking status: Former Smoker     Smokeless tobacco: Former User     Quit date: 11/14/1995     Alcohol use Yes      Comment: wine on rare occasions      Drug use: No     Sexual activity: Not Asked     Other Topics Concern     Sleep Concern No     Stress Concern No     Exercise Yes     on occasion     Social History Narrative       Review of Systems:  Skin:  Negative       Eyes:  Negative  "contacts    ENT:  Negative      Respiratory:  Positive for dyspnea on exertion;shortness of breath     Cardiovascular:    Positive for;edema occ.  Gastroenterology: Negative      Genitourinary:  Negative      Musculoskeletal:  Negative      Neurologic:  Negative      Psychiatric:  Negative      Heme/Lymph/Imm:  Positive for allergies    Endocrine:  Negative        Physical Exam:  Vitals: BP (!) 163/98  Pulse 58  Ht 1.626 m (5' 4\")  Wt 97.1 kg (214 lb 1.6 oz)  BMI 36.75 kg/m2    Constitutional:  cooperative;in no acute distress        Skin:  warm and dry to the touch        Head:  no masses or lesions;normocephalic        Eyes:  pupils equal and round, conjunctivae and lids unremarkable, sclera white, no xanthalasma, EOMS intact, no nystagmus        ENT:  dentition good;no pallor or cyanosis        Neck:  carotid pulses are full and equal bilaterally;no carotid bruit;JVP normal        Chest:  clear to auscultation          Cardiac: regular rhythm;normal S1 and S2;no murmurs, gallops or rubs detected                  Abdomen:  abdomen soft;no masses        Vascular: pulses full and equal, no bruits auscultated                                   2+ left radial, 1+ right radial, 2+ femoral bilaterally,     Extremities and Back:  no edema;no deformities, clubbing, cyanosis, erythema observed              Neurological:  no gross motor deficits              CC  Mica Tobias MD   [facility]  [address]  [place] [address]              "

## 2017-04-18 NOTE — MR AVS SNAPSHOT
After Visit Summary   4/18/2017    Glenis Ni    MRN: [id number]           Patient Information     Date Of Birth          1965        Visit Information        Provider Department      4/18/2017 8:45 AM Mica Tobias MD [facility]        Today's Diagnoses     Benign essential hypertension    -  1    Coronary artery disease involving native coronary artery of native heart without angina pectoris        Peripheral vascular disease (H)        Mixed hyperlipidemia           Follow-ups after your visit        Additional Services     Follow-Up with Cardiac Advanced Practice Provider                 Future tests that were ordered for you today     Open Future Orders        Priority Expected Expires Ordered    Follow-Up with Cardiac Advanced Practice Provider Routine 5/18/2017 4/18/2018 4/18/2017    US Renal Complete w Doppler Complete Routine 4/25/2017 4/18/2018 4/18/2017    NM Exercise stress test (nuc card) Routine 4/25/2017 4/18/2018 4/18/2017            Who to contact     If you have questions or need follow up information about today's clinic visit or your schedule please contact [facility] directly at [phone].  Normal or non-critical lab and imaging results will be communicated to you by [employer], letter or phone within 4 business days after the clinic has received the results. If you do not hear from us within 7 days, please contact the clinic through [employer] or phone. If you have a critical or abnormal lab result, we will notify you by phone as soon as possible.  Submit refill requests through [employer] or call your pharmacy and they will forward the refill request to us. Please allow 3 business days for your refill to be completed.          Additional Information About Your Visit        MyChart Information     Cl[employer]D lets you send messages to your doctor, view your test results, renew your  "prescriptions, schedule appointments and more. To sign up, go to [email or web address] . Click on \"Log in\" on the left side of the screen, which will take you to the Welcome page. Then click on \"Sign up Now\" on the right side of the page.     You will be asked to enter the access code listed below, as well as some personal information. Please follow the directions to create your username and password.     Your access code is: [email or web address]  Expires: 2017 11:43 AM     Your access code will  in 90 days. If you need help or a new code, please call your [place] clinic or [phone].        Care EveryWhere ID     This is your Care EveryWhere ID. This could be used by other organizations to access your [place] medical records  [id number]        Your Vitals Were     Pulse Height BMI (Body Mass Index)             58 1.626 m (5' 4\") 36.75 kg/m2          Blood Pressure from Last 3 Encounters:   17 (!) 163/98   17 140/88   16 138/78    Weight from Last 3 Encounters:   17 97.1 kg (214 lb 1.6 oz)   17 92.1 kg (203 lb)   16 97.5 kg (215 lb)              We Performed the Following     Follow-Up with Cardiologist          Today's Medication Changes          These changes are accurate as of: 17  9:06 AM.  If you have any questions, ask your nurse or doctor.               These medicines have changed or have updated prescriptions.        Dose/Directions    amLODIPine 10 MG tablet   Commonly known as:  NORVASC   This may have changed:    - medication strength  - how much to take   Used for:  Benign essential hypertension   Changed by:  Mica Tobias MD        Dose:  10 mg   Take 1 tablet (10 mg) by mouth daily   Quantity:  90 tablet   Refills:  3            Where to get your medicines      These medications were sent to [id number] [address] [address] [facility] [address]     Phone:  [phone]     amLODIPine 10 MG tablet    " nitroglycerin 0.4 MG sublingual tablet                Primary Care Provider Office Phone # Fax #    Ruddy Carter [phone]       [facility] [address] [place] [address]        Thank you!     Thank you for choosing [facility] [place]  for your care. Our goal is always to provide you with excellent care. Hearing back from our patients is one way we can continue to improve our services. Please take a few minutes to complete the written survey that you may receive in the mail after your visit with us. Thank you!             Your Updated Medication List - Protect others around you: Learn how to safely use, store and throw away your medicines at www.disposemymeds.org.          This list is accurate as of: 4/18/17  9:06 AM.  Always use your most recent med list.                   Brand Name Dispense Instructions for use    amLODIPine 10 MG tablet    NORVASC    90 tablet    Take 1 tablet (10 mg) by mouth daily       ASPIRIN PO      Take 325 mg by mouth daily       CRESTOR 20 MG tablet   Generic drug:  rosuvastatin      Take 20 mg by mouth daily       losartan 100 MG tablet    COZAAR    30 tablet    Take 1 tablet (100 mg) by mouth daily       metoprolol 25 MG 24 hr tablet    TOPROL-XL     Take 25 mg by mouth daily.       nitroglycerin 0.4 MG sublingual tablet    NITROSTAT    25 tablet    Place 1 tablet (0.4 mg) under the tongue every 5 minutes as needed for chest pain       spironolactone 25 MG tablet    ALDACTONE    90 tablet    Take 1 tablet (25 mg) by mouth daily

## 2017-05-01 ENCOUNTER — HOSPITAL ENCOUNTER (OUTPATIENT)
Dept: CARDIOLOGY | Facility: CLINIC | Age: 52
Discharge: HOME | End: 2017-05-01
Attending: INTERNAL MEDICINE | Admitting: INTERNAL MEDICINE
Payer: MEDICARE

## 2017-05-01 ENCOUNTER — HOSPITAL ENCOUNTER (OUTPATIENT)
Dept: ULTRASOUND IMAGING | Facility: CLINIC | Age: 52
Discharge: HOME | End: 2017-05-01
Attending: INTERNAL MEDICINE | Admitting: INTERNAL MEDICINE
Payer: MEDICARE

## 2017-05-01 DIAGNOSIS — I10 BENIGN ESSENTIAL HYPERTENSION: ICD-10-CM

## 2017-05-01 DIAGNOSIS — I25.10 CORONARY ARTERY DISEASE INVOLVING NATIVE CORONARY ARTERY OF NATIVE HEART WITHOUT ANGINA PECTORIS: ICD-10-CM

## 2017-05-01 PROCEDURE — 93018 CV STRESS TEST I&R ONLY: CPT | Performed by: INTERNAL MEDICINE

## 2017-05-01 PROCEDURE — 93016 CV STRESS TEST SUPVJ ONLY: CPT | Performed by: INTERNAL MEDICINE

## 2017-05-01 PROCEDURE — 93975 VASCULAR STUDY: CPT | Mod: 26 | Performed by: INTERNAL MEDICINE

## 2017-05-01 PROCEDURE — 78452 HT MUSCLE IMAGE SPECT MULT: CPT | Mod: 26 | Performed by: INTERNAL MEDICINE

## 2017-05-01 PROCEDURE — 76770 US EXAM ABDO BACK WALL COMP: CPT | Mod: 26 | Performed by: INTERNAL MEDICINE

## 2017-05-01 PROCEDURE — 93975 VASCULAR STUDY: CPT | Mod: TC

## 2017-05-01 RX ADMIN — TETROFOSMIN 18.98 MCI.: 0.23 INJECTION, POWDER, LYOPHILIZED, FOR SOLUTION INTRAVENOUS at 10:41

## 2017-05-01 RX ADMIN — TETROFOSMIN 6.08 MCI.: 0.23 INJECTION, POWDER, LYOPHILIZED, FOR SOLUTION INTRAVENOUS at 09:07

## 2017-05-02 ENCOUNTER — TELEPHONE (OUTPATIENT)
Dept: CARDIOLOGY | Facility: CLINIC | Age: 52
End: 2017-05-02

## 2017-05-02 NOTE — TELEPHONE ENCOUNTER
KAYLEE OV 5-30-17. Patient to be called with results.   Renal US also done 5-1-17   Nuclear stress test 5-1-17 - Last OV 4-18-17 with Dr. Tobias - for followup with symptoms of exertional dyspnea and left arm discomfort. Given her significant history of coronary artery disease, I think that further evaluation with a stress nuclear perfusion study would be reasonable.   1.  Myocardial perfusion imaging using single isotope technique  demonstrated no evidence of ischemia or infarction. 81% of target  heart rate was achieved. This marginally decreases the sensitivity of  the test.  2. Gated images demonstrated normal size left ventricle with normal  wall motion.  The left ventricular systolic function is normal at 63%.  3. Compared to the prior study from 2014, this study does not show any  ischemia .  Dr. Tobias to review.

## 2017-05-04 NOTE — TELEPHONE ENCOUNTER
Attempted to contact patient to review results of US renal and nuclear study. Patient has OV 5/30/17 to discuss. Left message for patient to call back.

## 2017-05-09 ENCOUNTER — TELEPHONE (OUTPATIENT)
Dept: CARDIOLOGY | Facility: CLINIC | Age: 52
End: 2017-05-09

## 2017-05-09 NOTE — TELEPHONE ENCOUNTER
Spoke with patient to review preliminary results of nuclear study (no ischemia, good pumping function) and US renal (slight increase in right side stenosis but no changes in plan per Dr. Tobias) and patient to discuss at OV 5/30/17. Patient verbalize understanding and agreed with plan. She is seeing her PMD today at 2:00 and requests that we fax her recent studies and labs to Dr. Carter @ [phone]. Records sent as requested at 12:00 today.

## 2017-05-30 ENCOUNTER — OFFICE VISIT (OUTPATIENT)
Dept: CARDIOLOGY | Facility: CLINIC | Age: 52
End: 2017-05-30
Attending: INTERNAL MEDICINE
Payer: MEDICARE

## 2017-05-30 VITALS
HEIGHT: 64 IN | WEIGHT: 213.7 LBS | HEART RATE: 62 BPM | SYSTOLIC BLOOD PRESSURE: 132 MMHG | DIASTOLIC BLOOD PRESSURE: 86 MMHG | BODY MASS INDEX: 36.48 KG/M2

## 2017-05-30 DIAGNOSIS — I10 BENIGN ESSENTIAL HYPERTENSION: Primary | ICD-10-CM

## 2017-05-30 DIAGNOSIS — I77.3 FIBROMUSCULAR DYSPLASIA OF RENAL ARTERY (H): ICD-10-CM

## 2017-05-30 DIAGNOSIS — E78.5 HYPERLIPIDEMIA LDL GOAL <70: ICD-10-CM

## 2017-05-30 DIAGNOSIS — I25.10 CORONARY ARTERY DISEASE INVOLVING NATIVE CORONARY ARTERY OF NATIVE HEART WITHOUT ANGINA PECTORIS: ICD-10-CM

## 2017-05-30 PROCEDURE — 99214 OFFICE O/P EST MOD 30 MIN: CPT | Performed by: NURSE PRACTITIONER

## 2017-05-30 NOTE — MR AVS SNAPSHOT
After Visit Summary   5/30/2017    Glenis Ni    MRN: [id number]           Patient Information     Date Of Birth          1965        Visit Information        Provider Department      5/30/2017 8:10 AM Abbi Olivas APRN CNP [facility]        Today's Diagnoses     Benign essential hypertension    -  1    Coronary artery disease involving native coronary artery of native heart without angina pectoris        Hyperlipidemia LDL goal <70          Care Instructions    Thanks for coming into [facility] today.    We discussed:   1. Follow-up with Primary provider regarding asthma if shortness of breath and chest discomfort returns  2. Call the clinic if your blood pressure is consistently greater than 140/90.   3.  Can take Coricidin HB for cold medicine- it won't worsen your blood pressure     Medication changes:    No changes     Follow up:   Dr. Tobias in 1 year with fasting lab work prior        Please call my nurse Lynn at [phone] with any questions or concerns.    Scheduling phone number: [phone]  Reminder: Please bring in all current medications, over the counter supplements and vitamin bottles to your next appointment.                  Follow-ups after your visit        Additional Services     Follow-Up with Cardiologist                 Future tests that were ordered for you today     Open Future Orders        Priority Expected Expires Ordered    Lipid Profile Routine 5/30/2018 5/30/2019 5/30/2017    Follow-Up with Cardiologist Routine 5/30/2018 5/30/2019 5/30/2017            Who to contact     If you have questions or need follow up information about today's clinic visit or your schedule please contact [facility] directly at [phone].  Normal or non-critical lab and imaging results will be communicated to you by MyChart, letter or phone within 4 business  "days after the clinic has received the results. If you do not hear from us within 7 days, please contact the clinic through [employer] or phone. If you have a critical or abnormal lab result, we will notify you by phone as soon as possible.  Submit refill requests through [employer] or call your pharmacy and they will forward the refill request to us. Please allow 3 business days for your refill to be completed.          Additional Information About Your Visit        [employer] Information     [employer] lets you send messages to your doctor, view your test results, renew your prescriptions, schedule appointments and more. To sign up, go to [email or web address] . Click on \"Log in\" on the left side of the screen, which will take you to the Welcome page. Then click on \"Sign up Now\" on the right side of the page.     You will be asked to enter the access code listed below, as well as some personal information. Please follow the directions to create your username and password.     Your access code is: [email or web address]  Expires: 2017 11:43 AM     Your access code will  in 90 days. If you need help or a new code, please call your [place] clinic or [phone].        Care EveryWhere ID     This is your Care EveryWhere ID. This could be used by other organizations to access your [place] medical records  [id number]        Your Vitals Were     Pulse Height BMI (Body Mass Index)             62 1.626 m (5' 4\") 36.68 kg/m2          Blood Pressure from Last 3 Encounters:   17 132/86   17 (!) 163/98   17 140/88    Weight from Last 3 Encounters:   17 96.9 kg (213 lb 11.2 oz)   17 97.1 kg (214 lb 1.6 oz)   17 92.1 kg (203 lb)              We Performed the Following     Follow-Up with Cardiac Advanced Practice Provider        Primary Care Provider Office Phone # Fax #    Ruddy Carter [phone]       [facility] [address] [place] [address]      "   Thank you!     Thank you for choosing [facility] [place]  for your care. Our goal is always to provide you with excellent care. Hearing back from our patients is one way we can continue to improve our services. Please take a few minutes to complete the written survey that you may receive in the mail after your visit with us. Thank you!             Your Updated Medication List - Protect others around you: Learn how to safely use, store and throw away your medicines at www.disposemymeds.org.          This list is accurate as of: 5/30/17  8:29 AM.  Always use your most recent med list.                   Brand Name Dispense Instructions for use    amLODIPine 10 MG tablet    NORVASC    90 tablet    Take 1 tablet (10 mg) by mouth daily       ASPIRIN PO      Take 325 mg by mouth daily       CRESTOR 20 MG tablet   Generic drug:  rosuvastatin      Take 20 mg by mouth daily       losartan 100 MG tablet    COZAAR    30 tablet    Take 1 tablet (100 mg) by mouth daily       metoprolol 25 MG 24 hr tablet    TOPROL-XL     Take 25 mg by mouth daily.       nitroglycerin 0.4 MG sublingual tablet    NITROSTAT    25 tablet    Place 1 tablet (0.4 mg) under the tongue every 5 minutes as needed for chest pain       spironolactone 25 MG tablet    ALDACTONE    90 tablet    Take 1 tablet (25 mg) by mouth daily

## 2017-05-30 NOTE — PROGRESS NOTES
Cardiology Clinic Progress Note  Glenis Ni MRN# [id number]   YOB: 1965 Age: 51 year old     Reason For Visit: Follow-up hypertension and testing    Primary Cardiologist:   Dr. Tobias          History of Presenting Illness:    Glenis Ni is a pleasant 51 year old patient with a past cardiac history significant for CAD status post angioplasty and stenting of the LAD in 2011 with repeat coronary angiogram in 2014 showing no significant obstructive disease.  Past medical history significant for fibromuscular dysplasia of the renal arteries status post PTCA 2011.  There were concerns of thoracic outlet syndrome but workup by Dr. Taveras was unrevealing.      She recently had 2 ED visits, in April, for shortness of breath, chest discomfort, and recurrent left arm numbness and tingling that were unremarkable.  She was noted to be hypertensive at one of those visits with systolic readings in the 160's. Pt was last seen by Dr. Tobias on 4/18/2017.  Given her chest discomfort, SOB, and occasional left arm discomfort nuclear exercise stress test was recommended.  Amlodipine was increased to 10 mg daily for hypertension and repeat renal ultrasound to evaluate for recurrent stenosis was recommended.    Pt presents today for follow-up hypertension and testing.  Renal artery ultrasound 5/1/2017 showed right moderate severe stenosis and left moderate stenosis, compared to 2013 left was stable and the right was slightly higher.  Dr. Tobias reviewed these findings and noted that no intervention was needed if blood pressure was controlled.  Nuclear exercise stress test 5/1/2017 showed no ischemia or infarct, no wall motion abnormalities, LVEF 63%, she exercised for 10 minutes at 13 METs only reaching 81% of her target heart rate, no chest pain during exercise and no ischemic changes on stress EKG.  These results were reviewed with her today.    She has not noted any side effects from increasing amlodipine.  She does  not currently check home blood pressure readings as her cuff is an accurate.  Her blood pressure today in the clinic is 132/86 and she has not taken all of her blood pressure medication yet as she takes her amlodipine in the afternoon.  She has recently had a cold and was not taking her blood pressure medications.  I did recommend that she continue with blood pressure medications even if she has a cold, as over-the-counter medications will not interact with these.  I also recommended she take Coricidin HB as this will not raise her blood pressure.  Since completing physical therapy her arm discomfort has improved.  Since she was last seen, she has not noted any further ZAPIEN or chest pain. Patient reports no chest pain, shortness of breath, PND, orthopnea, presyncope, syncope, edema, heart racing, or palpitations.    Current Cardiac Medications   Rosuvastatin 20 mg daily  Nitroglycerin p.r.n.  Amlodipine 10 mg daily  Losartan 100 mg daily  Spironolactone 25 mg daily  Aspirin 325 mg daily  Metoprolol XL 25 mg daily                    Assessment and Plan:     Plan  1.  Follow-up with primary if any further ZAPIEN or chest discomfort to see if asthma medications need to be adjusted  2.  May use Coricidin HB for cold medicine  3.  Follow-up with Dr. Tobias in one year with fasting lipids prior      1. CAD    Status post angioplasty and stenting of the LAD 2011    Repeat angiogram 2014 with no significant obstructive disease    Patient with complaints of ZAPIEN, chest discomfort, left arm discomfort    Nuclear exercise stress test showing no ischemia or infarct    Continue statin, aspirin, ARB, beta blocker, CCB      2. Renal fibromuscular dysplasia with hypertension    132/86, controlled    Renal artery ultrasound showing right renal artery slightly higher than ultrasounded 2013 with moderate severe stenosis, left stable    No intervention needed    Continue amlodipine, losartan, spironolactone,  metoprolol      3. Hyperlipidemia    Last LDL 69 4/2017    Continue rosuvastatin 20 mg daily           Thank you for allowing me to participate in this delightful patient's care.      This note was completed in part using Dragon voice recognition software. Although reviewed after completion, some word and grammatical errors may occur.    JOHNNY Barclay, CNP           Data:   All laboratory data reviewed

## 2017-05-30 NOTE — PATIENT INSTRUCTIONS
Thanks for coming into [facility] today.    We discussed:   1. Follow-up with Primary provider regarding asthma if shortness of breath and chest discomfort returns  2. Call the clinic if your blood pressure is consistently greater than 140/90.   3.  Can take Coricidin HB for cold medicine- it won't worsen your blood pressure     Medication changes:    No changes     Follow up:   Dr. Tobias in 1 year with fasting lab work prior        Please call my nurse Lynn at [phone] with any questions or concerns.    Scheduling phone number: [phone]  Reminder: Please bring in all current medications, over the counter supplements and vitamin bottles to your next appointment.

## 2017-05-30 NOTE — PROGRESS NOTES
HPI and Plan:   See dictation    Orders Placed This Encounter   Procedures     Lipid Profile     Follow-Up with Cardiologist       No orders of the defined types were placed in this encounter.      There are no discontinued medications.      Encounter Diagnoses   Name Primary?     Coronary artery disease involving native coronary artery of native heart without angina pectoris      Benign essential hypertension Yes     Hyperlipidemia LDL goal <70      Fibromuscular dysplasia of renal artery (H)        CURRENT MEDICATIONS:  Current Outpatient Prescriptions   Medication Sig Dispense Refill     rosuvastatin (CRESTOR) 20 MG tablet Take 20 mg by mouth daily       nitroglycerin (NITROSTAT) 0.4 MG sublingual tablet Place 1 tablet (0.4 mg) under the tongue every 5 minutes as needed for chest pain 25 tablet 3     amLODIPine (NORVASC) 10 MG tablet Take 1 tablet (10 mg) by mouth daily 90 tablet 3     losartan (COZAAR) 100 MG tablet Take 1 tablet (100 mg) by mouth daily 30 tablet 3     spironolactone (ALDACTONE) 25 MG tablet Take 1 tablet (25 mg) by mouth daily 90 tablet 3     ASPIRIN PO Take 325 mg by mouth daily       metoprolol (TOPROL-XL) 25 MG 24 hr tablet Take 25 mg by mouth daily.         ALLERGIES     Allergies   Allergen Reactions     Imdur [Isosorbide] Other (See Comments)     Headache.  Patient took 30mg dose for 2 days and had HA on consecutive days.     Morphine Sulfate Nausea and Vomiting       PAST MEDICAL HISTORY:  Past Medical History:   Diagnosis Date     Asthma      Coronary artery disease     cardiac cath 2011: JUDY to LAD and 1st diagonal     Fibromuscular dysplasia (H)      GERD (gastroesophageal reflux disease)      Hyperlipidemia LDL goal <70      Hypertension      MI (myocardial infarction) (H) 8/11     Peripheral vascular disease (H) 7/12    angioplasty bilat renal arteries     Renal artery stenosis (H)     Bilateral moderate renal stenosis noted on 12/20/13 renal ultrasound     Sleep apnea     mild per  "sleep study [facility] 7-10-15       PAST SURGICAL HISTORY:  Past Surgical History:   Procedure Laterality Date     COLONOSCOPY N/A 1/13/2015    Procedure: COLONOSCOPY;  Surgeon: Juan Miguel Garcia MD;  Location:  GI     GYN SURGERY       HYSTERECTOMY      partial       FAMILY HISTORY:  Family History   Problem Relation Age of Onset     Coronary Artery Disease No family hx of        SOCIAL HISTORY:  Social History     Social History     Marital status: Single     Spouse name: N/A     Number of children: N/A     Years of education: N/A     Social History Main Topics     Smoking status: Former Smoker     Smokeless tobacco: Former User     Quit date: 11/14/1995     Alcohol use Yes      Comment: wine on rare occasions      Drug use: No     Sexual activity: Not Asked     Other Topics Concern     Sleep Concern No     Stress Concern No     Exercise Yes     on occasion     Social History Narrative       Review of Systems:  Skin:  Negative       Eyes:  Negative contacts    ENT:  Negative      Respiratory:  Positive for cough pt has cold   Cardiovascular:    Positive for;edema getting worse  Gastroenterology: Negative      Genitourinary:  Negative      Musculoskeletal:  Negative      Neurologic:  Positive for headaches slight  Psychiatric:  Negative      Heme/Lymph/Imm:  Positive for allergies    Endocrine:  Negative        Physical Exam:  Vitals: /86  Pulse 62  Ht 1.626 m (5' 4\")  Wt 96.9 kg (213 lb 11.2 oz)  BMI 36.68 kg/m2    Constitutional:  cooperative;in no acute distress        Skin:  warm and dry to the touch        Head:  no masses or lesions;normocephalic        Eyes:  sclera white        ENT:  no pallor or cyanosis        Neck:  carotid pulses are full and equal bilaterally        Chest:  clear to auscultation          Cardiac: regular rhythm;normal S1 and S2;no murmurs, gallops or rubs detected                  Abdomen:  abdomen soft        Vascular: pulses full and equal                        "            2+ left radial, 1+ right radial, 2+ femoral bilaterally,     Extremities and Back:  no edema              Neurological:  no gross motor deficits              CC  Mica Tobias MD   [facility]  [address]  [place] [id number]

## 2017-05-30 NOTE — LETTER
5/30/2017    Ruddy Carter MD  [facility]   [address] [place] [address]    RE: Glenis Ni       Dear Colleague,    I had the pleasure of seeing Glenis iN in the [facility].    Cardiology Clinic Progress Note  Glenis Ni MRN# [id number]   YOB: 1965 Age: 51 year old     Reason For Visit: Follow-up hypertension and testing    Primary Cardiologist:   Dr. Tobias          History of Presenting Illness:    Glenis Ni is a pleasant 51 year old patient with a past cardiac history significant for CAD status post angioplasty and stenting of the LAD in 2011 with repeat coronary angiogram in 2014 showing no significant obstructive disease.  Past medical history significant for fibromuscular dysplasia of the renal arteries status post PTCA 2011.  There were concerns of thoracic outlet syndrome but workup by Dr. Taveras was unrevealing.      She recently had 2 ED visits, in April, for shortness of breath, chest discomfort, and recurrent left arm numbness and tingling that were unremarkable.  She was noted to be hypertensive at one of those visits with systolic readings in the 160's. Pt was last seen by Dr. Tobias on 4/18/2017.  Given her chest discomfort, SOB, and occasional left arm discomfort nuclear exercise stress test was recommended.  Amlodipine was increased to 10 mg daily for hypertension and repeat renal ultrasound to evaluate for recurrent stenosis was recommended.    Pt presents today for follow-up hypertension and testing.  Renal artery ultrasound 5/1/2017 showed right moderate severe stenosis and left moderate stenosis, compared to 2013 left was stable and the right was slightly higher.  Dr. Tobias reviewed these findings and noted that no intervention was needed if blood pressure was controlled.  Nuclear exercise stress test 5/1/2017 showed no ischemia or infarct, no wall motion abnormalities, LVEF 63%, she exercised for 10  minutes at 13 METs only reaching 81% of her target heart rate, no chest pain during exercise and no ischemic changes on stress EKG.  These results were reviewed with her today.    She has not noted any side effects from increasing amlodipine.  She does not currently check home blood pressure readings as her cuff is an accurate.  Her blood pressure today in the clinic is 132/86 and she has not taken all of her blood pressure medication yet as she takes her amlodipine in the afternoon.  She has recently had a cold and was not taking her blood pressure medications.  I did recommend that she continue with blood pressure medications even if she has a cold, as over-the-counter medications will not interact with these.  I also recommended she take Coricidin HB as this will not raise her blood pressure.  Since completing physical therapy her arm discomfort has improved.  Since she was last seen, she has not noted any further ZAPIEN or chest pain. Patient reports no chest pain, shortness of breath, PND, orthopnea, presyncope, syncope, edema, heart racing, or palpitations.    Current Cardiac Medications   Rosuvastatin 20 mg daily  Nitroglycerin p.r.n.  Amlodipine 10 mg daily  Losartan 100 mg daily  Spironolactone 25 mg daily  Aspirin 325 mg daily  Metoprolol XL 25 mg daily                    Assessment and Plan:     Plan  1.  Follow-up with primary if any further ZAPIEN or chest discomfort to see if asthma medications need to be adjusted  2.  May use Coricidin HB for cold medicine  3.  Follow-up with Dr. Tobias in one year with fasting lipids prior      1. CAD    Status post angioplasty and stenting of the LAD 2011    Repeat angiogram 2014 with no significant obstructive disease    Patient with complaints of ZAPIEN, chest discomfort, left arm discomfort    Nuclear exercise stress test showing no ischemia or infarct    Continue statin, aspirin, ARB, beta blocker, CCB      2. Renal fibromuscular dysplasia with hypertension    132/86,  controlled    Renal artery ultrasound showing right renal artery slightly higher than ultrasounded 2013 with moderate severe stenosis, left stable    No intervention needed    Continue amlodipine, losartan, spironolactone, metoprolol      3. Hyperlipidemia    Last LDL 69 4/2017    Continue rosuvastatin 20 mg daily           Thank you for allowing me to participate in this delightful patient's care.      This note was completed in part using Dragon voice recognition software. Although reviewed after completion, some word and grammatical errors may occur.    Abbi Olivas, APRN, CNP           Data:   All laboratory data reviewed      HPI and Plan:   See dictation    Orders Placed This Encounter   Procedures     Lipid Profile     Follow-Up with Cardiologist       No orders of the defined types were placed in this encounter.      There are no discontinued medications.      Encounter Diagnoses   Name Primary?     Coronary artery disease involving native coronary artery of native heart without angina pectoris      Benign essential hypertension Yes     Hyperlipidemia LDL goal <70      Fibromuscular dysplasia of renal artery (H)        CURRENT MEDICATIONS:  Current Outpatient Prescriptions   Medication Sig Dispense Refill     rosuvastatin (CRESTOR) 20 MG tablet Take 20 mg by mouth daily       nitroglycerin (NITROSTAT) 0.4 MG sublingual tablet Place 1 tablet (0.4 mg) under the tongue every 5 minutes as needed for chest pain 25 tablet 3     amLODIPine (NORVASC) 10 MG tablet Take 1 tablet (10 mg) by mouth daily 90 tablet 3     losartan (COZAAR) 100 MG tablet Take 1 tablet (100 mg) by mouth daily 30 tablet 3     spironolactone (ALDACTONE) 25 MG tablet Take 1 tablet (25 mg) by mouth daily 90 tablet 3     ASPIRIN PO Take 325 mg by mouth daily       metoprolol (TOPROL-XL) 25 MG 24 hr tablet Take 25 mg by mouth daily.         ALLERGIES     Allergies   Allergen Reactions     Imdur [Isosorbide] Other (See Comments)      Headache.  Patient took 30mg dose for 2 days and had HA on consecutive days.     Morphine Sulfate Nausea and Vomiting       PAST MEDICAL HISTORY:  Past Medical History:   Diagnosis Date     Asthma      Coronary artery disease     cardiac cath 2011: JUDY to LAD and 1st diagonal     Fibromuscular dysplasia (H)      GERD (gastroesophageal reflux disease)      Hyperlipidemia LDL goal <70      Hypertension      MI (myocardial infarction) (H) 8/11     Peripheral vascular disease (H) 7/12    angioplasty bilat renal arteries     Renal artery stenosis (H)     Bilateral moderate renal stenosis noted on 12/20/13 renal ultrasound     Sleep apnea     mild per sleep study [facility] 7-10-15       PAST SURGICAL HISTORY:  Past Surgical History:   Procedure Laterality Date     COLONOSCOPY N/A 1/13/2015    Procedure: COLONOSCOPY;  Surgeon: Juan Miguel Garcia MD;  Location:  GI     GYN SURGERY       HYSTERECTOMY      partial       FAMILY HISTORY:  Family History   Problem Relation Age of Onset     Coronary Artery Disease No family hx of        SOCIAL HISTORY:  Social History     Social History     Marital status: Single     Spouse name: N/A     Number of children: N/A     Years of education: N/A     Social History Main Topics     Smoking status: Former Smoker     Smokeless tobacco: Former User     Quit date: 11/14/1995     Alcohol use Yes      Comment: wine on rare occasions      Drug use: No     Sexual activity: Not Asked     Other Topics Concern     Sleep Concern No     Stress Concern No     Exercise Yes     on occasion     Social History Narrative       Review of Systems:  Skin:  Negative       Eyes:  Negative contacts    ENT:  Negative      Respiratory:  Positive for cough pt has cold   Cardiovascular:    Positive for;edema getting worse  Gastroenterology: Negative      Genitourinary:  Negative      Musculoskeletal:  Negative      Neurologic:  Positive for headaches slight  Psychiatric:  Negative      Heme/Lymph/Imm:   "Positive for allergies    Endocrine:  Negative        Physical Exam:  Vitals: /86  Pulse 62  Ht 1.626 m (5' 4\")  Wt 96.9 kg (213 lb 11.2 oz)  BMI 36.68 kg/m2    Constitutional:  cooperative;in no acute distress        Skin:  warm and dry to the touch        Head:  no masses or lesions;normocephalic        Eyes:  sclera white        ENT:  no pallor or cyanosis        Neck:  carotid pulses are full and equal bilaterally        Chest:  clear to auscultation          Cardiac: regular rhythm;normal S1 and S2;no murmurs, gallops or rubs detected                  Abdomen:  abdomen soft        Vascular: pulses full and equal                                   2+ left radial, 1+ right radial, 2+ femoral bilaterally,     Extremities and Back:  no edema              Neurological:  no gross motor deficits        Thank you for allowing me to participate in the care of your patient.    Sincerely,     JOHNNY Barclay [facility]    "

## 2017-06-27 DIAGNOSIS — I10 BENIGN ESSENTIAL HYPERTENSION: ICD-10-CM

## 2017-06-27 RX ORDER — LOSARTAN POTASSIUM 100 MG/1
100 TABLET ORAL DAILY
Qty: 30 TABLET | Refills: 11 | Status: SHIPPED | OUTPATIENT
Start: 2017-06-27 | End: 2017-09-26

## 2017-09-26 DIAGNOSIS — I10 BENIGN ESSENTIAL HYPERTENSION: ICD-10-CM

## 2017-09-26 RX ORDER — LOSARTAN POTASSIUM 100 MG/1
100 TABLET ORAL DAILY
Qty: 90 TABLET | Refills: 2 | Status: SHIPPED | OUTPATIENT
Start: 2017-09-26 | End: 2018-10-09

## 2018-04-24 DIAGNOSIS — I10 BENIGN ESSENTIAL HYPERTENSION: ICD-10-CM

## 2018-04-24 RX ORDER — AMLODIPINE BESYLATE 10 MG/1
10 TABLET ORAL DAILY
Qty: 90 TABLET | Refills: 0 | Status: SHIPPED | OUTPATIENT
Start: 2018-04-24 | End: 2018-11-12

## 2018-05-09 ENCOUNTER — TELEPHONE (OUTPATIENT)
Dept: CARDIOLOGY | Facility: CLINIC | Age: 53
End: 2018-05-09

## 2018-05-09 NOTE — TELEPHONE ENCOUNTER
Call from patient, she states she did not have help for awhile so had stopped taking her pills. She received home health care last week and just started having visits. Patient states her list and the home health RN list are not matching. Patient thinks her list is missing one of her heart pills.  Reviewed lists with patient and she no longer lists spironolactone on her list.  Patient also is concerned about her left ankle. She does not recall hurting it but states it is bruised and she has red spots going up her ankle. She made an appointment for tomorrow to see about her ankle at [facility].  Patient has a , Yanni Menendez RN, [phone].   Patient was seen at [facility] ER in March for altered mental status. She was referred for  assistance and a .    Spoke with , Yanni Menendez RN, and reviewed current med list which does match their list. Reviewed that patient may be missing spironolactone. Reviewed that patient has a [facility] visit tomorrow to check her left ankle.   will contact patient to review meds and her ankle.

## 2018-05-10 ENCOUNTER — TELEPHONE (OUTPATIENT)
Dept: CARDIOLOGY | Facility: CLINIC | Age: 53
End: 2018-05-10

## 2018-05-10 NOTE — TELEPHONE ENCOUNTER
[facility] Care Coordinator called to review medications. Attempted to return call, message left.

## 2018-05-10 NOTE — TELEPHONE ENCOUNTER
Spoke with [facility] Care Coordinator Yanni, Medication list as of May 2017 faxed to [phone].  Patient is due for an annual OV.  Yanni RAMOS will attempt to assist patient with medication understanding and administration/set up , and scheduling Annual cardiology OV.

## 2018-09-17 ENCOUNTER — OFFICE VISIT (OUTPATIENT)
Dept: CARDIOLOGY | Facility: CLINIC | Age: 53
End: 2018-09-17
Attending: NURSE PRACTITIONER
Payer: COMMERCIAL

## 2018-09-17 VITALS
WEIGHT: 212 LBS | HEIGHT: 64 IN | DIASTOLIC BLOOD PRESSURE: 85 MMHG | BODY MASS INDEX: 36.19 KG/M2 | SYSTOLIC BLOOD PRESSURE: 143 MMHG | HEART RATE: 64 BPM

## 2018-09-17 DIAGNOSIS — I10 BENIGN ESSENTIAL HYPERTENSION: ICD-10-CM

## 2018-09-17 DIAGNOSIS — E78.5 HYPERLIPIDEMIA LDL GOAL <70: ICD-10-CM

## 2018-09-17 DIAGNOSIS — I25.10 CORONARY ARTERY DISEASE INVOLVING NATIVE CORONARY ARTERY OF NATIVE HEART WITHOUT ANGINA PECTORIS: ICD-10-CM

## 2018-09-17 LAB
CHOLEST SERPL-MCNC: 153 MG/DL
HDLC SERPL-MCNC: 41 MG/DL
LDLC SERPL CALC-MCNC: 89 MG/DL
NONHDLC SERPL-MCNC: 112 MG/DL
TRIGL SERPL-MCNC: 116 MG/DL

## 2018-09-17 PROCEDURE — 99214 OFFICE O/P EST MOD 30 MIN: CPT | Performed by: INTERNAL MEDICINE

## 2018-09-17 PROCEDURE — 36415 COLL VENOUS BLD VENIPUNCTURE: CPT | Performed by: NURSE PRACTITIONER

## 2018-09-17 PROCEDURE — 80061 LIPID PANEL: CPT | Performed by: NURSE PRACTITIONER

## 2018-09-17 RX ORDER — ROSUVASTATIN CALCIUM 40 MG/1
40 TABLET, COATED ORAL DAILY
Qty: 90 TABLET | Refills: 3 | Status: SHIPPED | OUTPATIENT
Start: 2018-09-17 | End: 2022-02-07

## 2018-09-17 NOTE — LETTER
9/17/2018    Ruddy Carter MD  [facility] [address] [place] [address]    RE: Glenis Ni       Dear Colleague,    I had the pleasure of seeing Glenis Ni in the [facility].    HPI and Plan:   See dictation    Orders Placed This Encounter   Procedures     Lipid Profile       Orders Placed This Encounter   Medications     rosuvastatin (CRESTOR) 40 MG tablet     Sig: Take 1 tablet (40 mg) by mouth daily     Dispense:  90 tablet     Refill:  3       Medications Discontinued During This Encounter   Medication Reason     rosuvastatin (CRESTOR) 20 MG tablet Reorder         Encounter Diagnoses   Name Primary?     Coronary artery disease involving native coronary artery of native heart without angina pectoris      Benign essential hypertension        CURRENT MEDICATIONS:  Current Outpatient Prescriptions   Medication Sig Dispense Refill     amLODIPine (NORVASC) 10 MG tablet Take 1 tablet (10 mg) by mouth daily 90 tablet 0     ASPIRIN PO Take 325 mg by mouth daily       losartan (COZAAR) 100 MG tablet Take 1 tablet (100 mg) by mouth daily 90 tablet 2     metoprolol (TOPROL-XL) 25 MG 24 hr tablet Take 25 mg by mouth daily.       nitroglycerin (NITROSTAT) 0.4 MG sublingual tablet Place 1 tablet (0.4 mg) under the tongue every 5 minutes as needed for chest pain 25 tablet 3     rosuvastatin (CRESTOR) 40 MG tablet Take 1 tablet (40 mg) by mouth daily 90 tablet 3     spironolactone (ALDACTONE) 25 MG tablet Take 1 tablet (25 mg) by mouth daily (Patient not taking: Reported on 9/17/2018) 90 tablet 3     [DISCONTINUED] rosuvastatin (CRESTOR) 20 MG tablet Take 20 mg by mouth daily         ALLERGIES     Allergies   Allergen Reactions     Imdur [Isosorbide] Other (See Comments)     Headache.  Patient took 30mg dose for 2 days and had HA on consecutive days.     Morphine Sulfate Nausea and Vomiting       PAST MEDICAL HISTORY:  Past Medical History:   Diagnosis  "Date     Asthma      Coronary artery disease     cardiac cath 2011: JUDY to LAD and 1st diagonal     Fibromuscular dysplasia (H)      GERD (gastroesophageal reflux disease)      Hyperlipidemia LDL goal <70      Hypertension      MI (myocardial infarction) 8/11     Peripheral vascular disease (H) 7/12    angioplasty bilat renal arteries     Renal artery stenosis (H)     Bilateral moderate renal stenosis noted on 12/20/13 renal ultrasound     Sleep apnea     mild per sleep study [facility] 7-10-15       PAST SURGICAL HISTORY:  Past Surgical History:   Procedure Laterality Date     COLONOSCOPY N/A 1/13/2015    Procedure: COLONOSCOPY;  Surgeon: Juan Miguel Garcia MD;  Location:  GI     GYN SURGERY       HYSTERECTOMY      partial       FAMILY HISTORY:  Family History   Problem Relation Age of Onset     Coronary Artery Disease No family hx of        SOCIAL HISTORY:  Social History     Social History     Marital status: Single     Spouse name: N/A     Number of children: N/A     Years of education: N/A     Social History Main Topics     Smoking status: Former Smoker     Smokeless tobacco: Former User     Quit date: 11/14/1995     Alcohol use Yes      Comment: wine on rare occasions      Drug use: No     Sexual activity: Not Asked     Other Topics Concern     Sleep Concern No     Stress Concern No     Exercise Yes     on occasion     Social History Narrative       Review of Systems:  Skin:  Negative       Eyes:  Negative      ENT:  Negative      Respiratory:  Negative       Cardiovascular:  Negative      Gastroenterology: Negative      Genitourinary:  Negative      Musculoskeletal:  Negative      Neurologic:  Positive for headaches slight  Psychiatric:  Negative      Heme/Lymph/Imm:  Positive for allergies    Endocrine:  Negative        Physical Exam:  Vitals: /85  Pulse 64  Ht 1.626 m (5' 4\")  Wt 96.2 kg (212 lb)  BMI 36.39 kg/m2    Constitutional:  cooperative;in no acute distress        Skin:  warm and " dry to the touch          Head:  no masses or lesions;normocephalic        Eyes:  sclera white        Lymph:      ENT:  no pallor or cyanosis        Neck:  carotid pulses are full and equal bilaterally        Respiratory:  clear to auscultation         Cardiac: regular rhythm;normal S1 and S2;no murmurs, gallops or rubs detected                pulses full and equal                                   2+ left radial, 1+ right radial, 2+ femoral bilaterally,     GI:  abdomen soft        Extremities and Muscular Skeletal:  no edema              Neurological:  no gross motor deficits        Psych:           CC  Abbi Marichuy Olivas, APRN CNP  [address]  [place] [address]                Service Date: 09/17/2018      HISTORY OF PRESENT ILLNESS:  I had the pleasure of seeing Ms. Ni in followup at the [facility] today.  She is a very pleasant 53-year-old female who I last saw in 04/2017.  She has a history of coronary artery disease and is status post angioplasty and stenting of a 95% LAD lesion in 08/2011.  She also has a history of fibromuscular dysplasia of the renal arteries and has undergone PTCA in the past.      Her last coronary angiogram was in 06/2014 at which point no significant obstructive disease was found.  She has also undergone a workup for thoracic outlet syndrome which has been unrevealing.      She presents today feeling well overall from a cardiovascular standpoint.  She specifically denies any chest discomfort, dyspnea on exertion, PND, orthopnea or lower extremity edema.  She denies any syncope or presyncope.  She has been taking most of her medications as prescribed, although we have 6 active prescriptions for her and she only recalls taking 5 medications daily.      Her physical exam is as dictated below.        Lipids today demonstrate total cholesterol 153, HDL 41, LDL of 89, triglycerides 116.      IMPRESSION:   1.  Coronary artery disease, status  post drug-eluting stent placement to mid LAD in .  She subsequently has undergone coronary angiography in , which was within normal limits.  Her last ischemic evaluation in 2017 was an exercise stress nuclear study which was within normal limits.   2.  History of renal fibromuscular dysplasia, status post stenting.   3.  Hypertension.   4.  Dyslipidemia.      Ms. Ni appears to be doing well overall from a cardiovascular standpoint.  I do note that her renal ultrasound from 2017 did demonstrate moderate stenosis in the left renovascular system and moderate to severe elevation on the right.  I will discuss further evaluation for this with Dr. Taveras who has seen the patient in the past.      From an overall risk factor standpoint, I have increased her rosuvastatin to 40 mg daily.  We will obtain a followup lipid panel in approximately 1 month.  I will have my nurses contact her to determine exactly what she is taking at home.      It was a pleasure seeing her in followup today.         ROSALINA TOBIAS MD             D: 2018   T: 2018   MT: DOUG      Name:     JOCELYN NI   MRN:      [id number]        Account:      [id number]   :      1965           Service Date: 2018      Document: [id number]       Thank you for allowing me to participate in the care of your patient.      Sincerely,     Rosalina Tobias MD     [facility]    cc:   Abbi Olivas, JOHNNY CNP  [address]  [place] [id number]

## 2018-09-17 NOTE — LETTER
9/17/2018      Ruddy Carter MD  [facility] [address] [place] [address]      RE: Glenis Ni       Dear Colleague,    I had the pleasure of seeing Glenis Ni in the [facility].    Service Date: 09/17/2018      HISTORY OF PRESENT ILLNESS:  I had the pleasure of seeing Ms. Ni in followup at the [facility] today.  She is a very pleasant 53-year-old female who I last saw in 04/2017.  She has a history of coronary artery disease and is status post angioplasty and stenting of a 95% LAD lesion in 08/2011.  She also has a history of fibromuscular dysplasia of the renal arteries and has undergone PTCA in the past.      Her last coronary angiogram was in 06/2014 at which point no significant obstructive disease was found.  She has also undergone a workup for thoracic outlet syndrome which has been unrevealing.      She presents today feeling well overall from a cardiovascular standpoint.  She specifically denies any chest discomfort, dyspnea on exertion, PND, orthopnea or lower extremity edema.  She denies any syncope or presyncope.  She has been taking most of her medications as prescribed, although we have 6 active prescriptions for her and she only recalls taking 5 medications daily.      Her physical exam is as dictated below.        Lipids today demonstrate total cholesterol 153, HDL 41, LDL of 89, triglycerides 116.      IMPRESSION:   1.  Coronary artery disease, status post drug-eluting stent placement to mid LAD in 2011.  She subsequently has undergone coronary angiography in 2014, which was within normal limits.  Her last ischemic evaluation in 05/2017 was an exercise stress nuclear study which was within normal limits.   2.  History of renal fibromuscular dysplasia, status post stenting.   3.  Hypertension.   4.  Dyslipidemia.      Ms. Ni appears to be doing well overall from a cardiovascular  standpoint.  I do note that her renal ultrasound from 2017 did demonstrate stable moderate stenosis in the left renovascular system and moderate to severe elevation on the right.  I will discuss further evaluation for this with Dr. Taveras who has seen the patient in the past.      From an overall risk factor standpoint, I have increased her rosuvastatin to 40 mg daily.  We will obtain a followup lipid panel in approximately 1 month.  I will have my nurses contact her to determine exactly what she is taking at home.      It was a pleasure seeing her in followup today.         MICA SINGH MD             D: 2018   T: 2018   MT: DOUG      Name:     JOCELYN GORDILLO   MRN:      [id number]        Account:      [id number]   :      1965           Service Date: 2018      Document: [id number]           Outpatient Encounter Prescriptions as of 2018   Medication Sig Dispense Refill     amLODIPine (NORVASC) 10 MG tablet Take 1 tablet (10 mg) by mouth daily 90 tablet 0     ASPIRIN PO Take 325 mg by mouth daily       losartan (COZAAR) 100 MG tablet Take 1 tablet (100 mg) by mouth daily 90 tablet 2     metoprolol (TOPROL-XL) 25 MG 24 hr tablet Take 25 mg by mouth daily.       nitroglycerin (NITROSTAT) 0.4 MG sublingual tablet Place 1 tablet (0.4 mg) under the tongue every 5 minutes as needed for chest pain 25 tablet 3     rosuvastatin (CRESTOR) 40 MG tablet Take 1 tablet (40 mg) by mouth daily 90 tablet 3     spironolactone (ALDACTONE) 25 MG tablet Take 1 tablet (25 mg) by mouth daily (Patient not taking: Reported on 2018) 90 tablet 3     [DISCONTINUED] rosuvastatin (CRESTOR) 20 MG tablet Take 20 mg by mouth daily       No facility-administered encounter medications on file as of 2018.        Again, thank you for allowing me to participate in the care of your patient.      Sincerely,    Mica Singh MD     [facility]

## 2018-09-17 NOTE — MR AVS SNAPSHOT
After Visit Summary   9/17/2018    Glenis Ni    MRN: [id number]           Patient Information     Date Of Birth          1965        Visit Information        Provider Department      9/17/2018 10:45 AM Mica Tobias MD [facility]        Today's Diagnoses     Coronary artery disease involving native coronary artery of native heart without angina pectoris        Benign essential hypertension           Follow-ups after your visit        Additional Services     Follow-Up with Cardiologist                 Your next 10 appointments already scheduled     Oct 22, 2018  9:00 AM CDT   LAB with [facility]   [facility] ([facility])    [address] [place] [address] [address]  [place]  [place] [address]   [phone]           Please do not eat 10-12 hours before your appointment if you are coming in fasting for labs on lipids, cholesterol, or glucose (sugar). This does not apply to pregnant women. Water, hot tea and black coffee (with nothing added) are okay. Do not drink other fluids, diet soda or chew gum.              Future tests that were ordered for you today     Open Future Orders        Priority Expected Expires Ordered    Follow-Up with Cardiologist Routine 9/17/2019 9/18/2019 9/17/2018    Lipid Profile Routine 10/17/2018 9/17/2019 9/17/2018            Who to contact     If you have questions or need follow up information about today's clinic visit or your schedule please contact [facility] directly at [phone].  Normal or non-critical lab and imaging results will be communicated to you by MyChart, letter or phone within 4 business days after the clinic has received the results. If you do not hear from us within 7 days, please contact the clinic through MyChart or phone. If you have a critical or abnormal lab result, we will notify you by phone as soon as possible.  Submit  "refill requests through [employer] or call your pharmacy and they will forward the refill request to us. Please allow 3 business days for your refill to be completed.          Additional Information About Your Visit        [employer] Information     [employer] lets you send messages to your doctor, view your test results, renew your prescriptions, schedule appointments and more. To sign up, go to [email or web address] . Click on \"Log in\" on the left side of the screen, which will take you to the Welcome page. Then click on \"Sign up Now\" on the right side of the page.     You will be asked to enter the access code listed below, as well as some personal information. Please follow the directions to create your username and password.     Your access code is: [email or web address]  Expires: 2018  9:18 PM     Your access code will  in 90 days. If you need help or a new code, please call your [place] clinic or [phone].        Care EveryWhere ID     This is your Care EveryWhere ID. This could be used by other organizations to access your [place] medical records  [id number]        Your Vitals Were     Pulse Height BMI (Body Mass Index)             64 1.626 m (5' 4\") 36.39 kg/m2          Blood Pressure from Last 3 Encounters:   18 143/85   17 132/86   17 (!) 163/98    Weight from Last 3 Encounters:   18 96.2 kg (212 lb)   17 96.9 kg (213 lb 11.2 oz)   17 97.1 kg (214 lb 1.6 oz)              We Performed the Following     Follow-Up with Cardiologist          Today's Medication Changes          These changes are accurate as of 18  9:18 PM.  If you have any questions, ask your nurse or doctor.               These medicines have changed or have updated prescriptions.        Dose/Directions    rosuvastatin 40 MG tablet   Commonly known as:  CRESTOR   This may have changed:    - medication strength  - how much to take   Used for:  Coronary artery disease involving native coronary artery " of native heart without angina pectoris   Changed by:  Mica Tobias MD        Dose:  40 mg   Take 1 tablet (40 mg) by mouth daily   Quantity:  90 tablet   Refills:  3            Where to get your medicines      These medications were sent to [id number] - [place], [place] - [address]  [address], [place] [address]     Phone:  [phone]     rosuvastatin 40 MG tablet                Primary Care Provider Office Phone # Fax #    Ruddy Carter [phone]       [facility] [address] [place] [address]        Equal Access to Services     [facility]: Hadii lu kruse hadasho Soomaali, waaxda luqadaha, qaybta kaalmada adeegyada, lianne harden . So [facility] [phone].    ATENCIÓN: Si habla español, tiene a hayden disposición servicios gratuitos de asistencia lingüística. Llame al [phone].    We comply with applicable federal civil rights laws and [place] laws. We do not discriminate on the basis of race, color, national origin, age, disability, sex, sexual orientation, or gender identity.            Thank you!     Thank you for choosing [facility]  for your care. Our goal is always to provide you with excellent care. Hearing back from our patients is one way we can continue to improve our services. Please take a few minutes to complete the written survey that you may receive in the mail after your visit with us. Thank you!             Your Updated Medication List - Protect others around you: Learn how to safely use, store and throw away your medicines at www.disposemymeds.org.          This list is accurate as of 9/17/18  9:18 PM.  Always use your most recent med list.                   Brand Name Dispense Instructions for use Diagnosis    amLODIPine 10 MG tablet    NORVASC    90 tablet    Take 1 tablet (10 mg) by mouth daily    Benign essential hypertension       ASPIRIN PO      Take  325 mg by mouth daily        losartan 100 MG tablet    COZAAR    90 tablet    Take 1 tablet (100 mg) by mouth daily    Benign essential hypertension       metoprolol succinate 25 MG 24 hr tablet    TOPROL-XL     Take 25 mg by mouth daily.        nitroGLYcerin 0.4 MG sublingual tablet    NITROSTAT    25 tablet    Place 1 tablet (0.4 mg) under the tongue every 5 minutes as needed for chest pain    Coronary artery disease involving native coronary artery of native heart without angina pectoris       rosuvastatin 40 MG tablet    CRESTOR    90 tablet    Take 1 tablet (40 mg) by mouth daily    Coronary artery disease involving native coronary artery of native heart without angina pectoris       spironolactone 25 MG tablet    ALDACTONE    90 tablet    Take 1 tablet (25 mg) by mouth daily    Benign essential hypertension

## 2018-09-17 NOTE — PROGRESS NOTES
Service Date: 09/17/2018      HISTORY OF PRESENT ILLNESS:  I had the pleasure of seeing Ms. Ni in followup at the [facility] today.  She is a very pleasant 53-year-old female who I last saw in 04/2017.  She has a history of coronary artery disease and is status post angioplasty and stenting of a 95% LAD lesion in 08/2011.  She also has a history of fibromuscular dysplasia of the renal arteries and has undergone PTCA in the past.      Her last coronary angiogram was in 06/2014 at which point no significant obstructive disease was found.  She has also undergone a workup for thoracic outlet syndrome which has been unrevealing.      She presents today feeling well overall from a cardiovascular standpoint.  She specifically denies any chest discomfort, dyspnea on exertion, PND, orthopnea or lower extremity edema.  She denies any syncope or presyncope.  She has been taking most of her medications as prescribed, although we have 6 active prescriptions for her and she only recalls taking 5 medications daily.      Her physical exam is as dictated below.        Lipids today demonstrate total cholesterol 153, HDL 41, LDL of 89, triglycerides 116.      IMPRESSION:   1.  Coronary artery disease, status post drug-eluting stent placement to mid LAD in 2011.  She subsequently has undergone coronary angiography in 2014, which was within normal limits.  Her last ischemic evaluation in 05/2017 was an exercise stress nuclear study which was within normal limits.   2.  History of renal fibromuscular dysplasia, status post stenting.   3.  Hypertension.   4.  Dyslipidemia.      Ms. Ni appears to be doing well overall from a cardiovascular standpoint.  I do note that her renal ultrasound from 05/2017 did demonstrate stable moderate stenosis in the left renovascular system and moderate to severe elevation on the right.  I will discuss further evaluation for this with Dr. Taveras who has seen the patient  in the past.      From an overall risk factor standpoint, I have increased her rosuvastatin to 40 mg daily.  We will obtain a followup lipid panel in approximately 1 month.  I will have my nurses contact her to determine exactly what she is taking at home.      It was a pleasure seeing her in followup today.         ROSALINA SINGH MD             D: 2018   T: 2018   MT: DOUG      Name:     JOCELYN GORDILLO   MRN:      [id number]        Account:      [id number]   :      1965           Service Date: 2018      Document: [id number]

## 2018-09-18 NOTE — PROGRESS NOTES
HPI and Plan:   See dictation    Orders Placed This Encounter   Procedures     Lipid Profile       Orders Placed This Encounter   Medications     rosuvastatin (CRESTOR) 40 MG tablet     Sig: Take 1 tablet (40 mg) by mouth daily     Dispense:  90 tablet     Refill:  3       Medications Discontinued During This Encounter   Medication Reason     rosuvastatin (CRESTOR) 20 MG tablet Reorder         Encounter Diagnoses   Name Primary?     Coronary artery disease involving native coronary artery of native heart without angina pectoris      Benign essential hypertension        CURRENT MEDICATIONS:  Current Outpatient Prescriptions   Medication Sig Dispense Refill     amLODIPine (NORVASC) 10 MG tablet Take 1 tablet (10 mg) by mouth daily 90 tablet 0     ASPIRIN PO Take 325 mg by mouth daily       losartan (COZAAR) 100 MG tablet Take 1 tablet (100 mg) by mouth daily 90 tablet 2     metoprolol (TOPROL-XL) 25 MG 24 hr tablet Take 25 mg by mouth daily.       nitroglycerin (NITROSTAT) 0.4 MG sublingual tablet Place 1 tablet (0.4 mg) under the tongue every 5 minutes as needed for chest pain 25 tablet 3     rosuvastatin (CRESTOR) 40 MG tablet Take 1 tablet (40 mg) by mouth daily 90 tablet 3     spironolactone (ALDACTONE) 25 MG tablet Take 1 tablet (25 mg) by mouth daily (Patient not taking: Reported on 9/17/2018) 90 tablet 3     [DISCONTINUED] rosuvastatin (CRESTOR) 20 MG tablet Take 20 mg by mouth daily         ALLERGIES     Allergies   Allergen Reactions     Imdur [Isosorbide] Other (See Comments)     Headache.  Patient took 30mg dose for 2 days and had HA on consecutive days.     Morphine Sulfate Nausea and Vomiting       PAST MEDICAL HISTORY:  Past Medical History:   Diagnosis Date     Asthma      Coronary artery disease     cardiac cath 2011: JUDY to LAD and 1st diagonal     Fibromuscular dysplasia (H)      GERD (gastroesophageal reflux disease)      Hyperlipidemia LDL goal <70      Hypertension      MI (myocardial infarction)  "8/11     Peripheral vascular disease (H) 7/12    angioplasty bilat renal arteries     Renal artery stenosis (H)     Bilateral moderate renal stenosis noted on 12/20/13 renal ultrasound     Sleep apnea     mild per sleep study [facility] 7-10-15       PAST SURGICAL HISTORY:  Past Surgical History:   Procedure Laterality Date     COLONOSCOPY N/A 1/13/2015    Procedure: COLONOSCOPY;  Surgeon: Juan Miguel Garcia MD;  Location:  GI     GYN SURGERY       HYSTERECTOMY      partial       FAMILY HISTORY:  Family History   Problem Relation Age of Onset     Coronary Artery Disease No family hx of        SOCIAL HISTORY:  Social History     Social History     Marital status: Single     Spouse name: N/A     Number of children: N/A     Years of education: N/A     Social History Main Topics     Smoking status: Former Smoker     Smokeless tobacco: Former User     Quit date: 11/14/1995     Alcohol use Yes      Comment: wine on rare occasions      Drug use: No     Sexual activity: Not Asked     Other Topics Concern     Sleep Concern No     Stress Concern No     Exercise Yes     on occasion     Social History Narrative       Review of Systems:  Skin:  Negative       Eyes:  Negative      ENT:  Negative      Respiratory:  Negative       Cardiovascular:  Negative      Gastroenterology: Negative      Genitourinary:  Negative      Musculoskeletal:  Negative      Neurologic:  Positive for headaches slight  Psychiatric:  Negative      Heme/Lymph/Imm:  Positive for allergies    Endocrine:  Negative        Physical Exam:  Vitals: /85  Pulse 64  Ht 1.626 m (5' 4\")  Wt 96.2 kg (212 lb)  BMI 36.39 kg/m2    Constitutional:  cooperative;in no acute distress        Skin:  warm and dry to the touch          Head:  no masses or lesions;normocephalic        Eyes:  sclera white        Lymph:      ENT:  no pallor or cyanosis        Neck:  carotid pulses are full and equal bilaterally        Respiratory:  clear to auscultation     "     Cardiac: regular rhythm;normal S1 and S2;no murmurs, gallops or rubs detected                pulses full and equal                                   2+ left radial, 1+ right radial, 2+ femoral bilaterally,     GI:  abdomen soft        Extremities and Muscular Skeletal:  no edema              Neurological:  no gross motor deficits        Psych:           CC  Abbi Olivas, APRN CNP  [address]  [place] [address]

## 2018-10-03 ENCOUNTER — TELEPHONE (OUTPATIENT)
Dept: CARDIOLOGY | Facility: CLINIC | Age: 53
End: 2018-10-03

## 2018-10-03 DIAGNOSIS — I70.1 RENAL ARTERY STENOSIS (H): Primary | ICD-10-CM

## 2018-10-03 NOTE — TELEPHONE ENCOUNTER
Attempted to contact patient to review Dr. Tobias's recommendations, patient did not answer. Left message for patient to call back.

## 2018-10-03 NOTE — TELEPHONE ENCOUNTER
----- Message from Mica Tobias MD sent at 10/2/2018 10:28 AM CDT -----  Could we repeat this patient's renal artery doppler? Thanks.

## 2018-10-04 NOTE — TELEPHONE ENCOUNTER
Spoke with patient to review Dr. Tobias's recommendation for US renal study. Patient verbalized understanding and agreed with plan. Connected patient to scheduling.

## 2018-10-09 ENCOUNTER — TELEPHONE (OUTPATIENT)
Dept: CARDIOLOGY | Facility: CLINIC | Age: 53
End: 2018-10-09

## 2018-10-09 ENCOUNTER — HOSPITAL ENCOUNTER (OUTPATIENT)
Dept: ULTRASOUND IMAGING | Facility: CLINIC | Age: 53
Discharge: HOME | End: 2018-10-09
Attending: INTERNAL MEDICINE | Admitting: INTERNAL MEDICINE
Payer: COMMERCIAL

## 2018-10-09 DIAGNOSIS — I10 BENIGN ESSENTIAL HYPERTENSION: ICD-10-CM

## 2018-10-09 DIAGNOSIS — I70.1 RENAL ARTERY STENOSIS (H): ICD-10-CM

## 2018-10-09 PROCEDURE — 76770 US EXAM ABDO BACK WALL COMP: CPT | Mod: 26 | Performed by: INTERNAL MEDICINE

## 2018-10-09 PROCEDURE — 76770 US EXAM ABDO BACK WALL COMP: CPT | Mod: XS

## 2018-10-09 PROCEDURE — 93975 VASCULAR STUDY: CPT | Mod: 26 | Performed by: INTERNAL MEDICINE

## 2018-10-09 RX ORDER — SPIRONOLACTONE 25 MG/1
25 TABLET ORAL DAILY
Qty: 90 TABLET | Refills: 3 | Status: SHIPPED | OUTPATIENT
Start: 2018-10-09 | End: 2019-10-21

## 2018-10-09 RX ORDER — LOSARTAN POTASSIUM 100 MG/1
100 TABLET ORAL DAILY
Qty: 90 TABLET | Refills: 3 | Status: SHIPPED | OUTPATIENT
Start: 2018-10-09 | End: 2022-02-07

## 2018-10-09 NOTE — TELEPHONE ENCOUNTER
Spoke with patient to review Dr. Tobias's recommendation to resume losartan and spironolactone. Patient asks that refills be sent as she no longer has pills in the house. Rx escripted.

## 2018-10-09 NOTE — TELEPHONE ENCOUNTER
Call from patient with an updated med list:  Omeprazole 20mg daily (not listed)  crestor 40mg daily (match)  Amlodipine 10mg daily (match)  toprol XL 25mg daily (match)  NTG prn    On the med list but NOT taking:  Losartan 100mg daily  Spironolactone 25mg daily  Will message Dr. Tobias to review

## 2018-10-10 ENCOUNTER — DOCUMENTATION ONLY (OUTPATIENT)
Dept: CARDIOLOGY | Facility: CLINIC | Age: 53
End: 2018-10-10

## 2018-10-10 DIAGNOSIS — E78.2 MIXED HYPERLIPIDEMIA: ICD-10-CM

## 2018-10-10 DIAGNOSIS — I70.1 RENAL ARTERY STENOSIS (H): Primary | ICD-10-CM

## 2018-10-10 NOTE — PROGRESS NOTES
"US renal 10/10/18 noted,   US results: 1. The flow velocities in the mid and distal right and left renal arteries are elevated indicating moderate to severe stenosis likely related to patient's known fibromuscular dysplasia.  2. Compared to the prior study from May 1, 2017, the left renal artery velocities are higher and the right is unchanged.    Patient has history of renal fibromuscular dysplasia, status post stenting. Patient recently restarted on losartan and spironolactone.  Will message Dr. Tobias to review    Message from Dr. Tobias, \"Anson Mckeon, can you look at this patient's ultrasound and let me know if you think we should do anything further?\"    Message from Dr. Taveras, \"If blood pressure is controlled I wouldn't pursue any further testing. Repeat ultrasound in a year. If BP becomes out of control we an consider renal artery angioplasty although the benefit in patients with fmd and chronic hypertension isn't great\"    Called patient with update that US renal showed an increase n the left renal artery velocity and that Dr. Tobias is planning a repeat US in one year. Patient is returning 10/22/18 for lipid panel and is taking crestor 40mg daily.  Order placed for US renal in 1 year.  "

## 2018-10-11 NOTE — TELEPHONE ENCOUNTER
Anson Mckeon, can you look at this patient's ultrasound and let me know if you think we should do anything further? Thanks!

## 2018-10-15 NOTE — TELEPHONE ENCOUNTER
If blood pressure is controlled I wouldn't pursue any further testing. Repeat ultrasound in a year. If BP becomes out of control we an consider renal artery angioplasty although the benefit in patients with fmd and chronic hypertension isn't great.

## 2018-10-22 ENCOUNTER — DOCUMENTATION ONLY (OUTPATIENT)
Dept: CARDIOLOGY | Facility: CLINIC | Age: 53
End: 2018-10-22

## 2018-10-22 DIAGNOSIS — I25.10 CORONARY ARTERY DISEASE INVOLVING NATIVE CORONARY ARTERY OF NATIVE HEART WITHOUT ANGINA PECTORIS: ICD-10-CM

## 2018-10-22 DIAGNOSIS — I10 BENIGN ESSENTIAL HYPERTENSION: ICD-10-CM

## 2018-10-22 DIAGNOSIS — I25.10 CORONARY ARTERY DISEASE INVOLVING NATIVE CORONARY ARTERY OF NATIVE HEART WITHOUT ANGINA PECTORIS: Primary | ICD-10-CM

## 2018-10-22 LAB
CHOLEST SERPL-MCNC: 125 MG/DL
HDLC SERPL-MCNC: 34 MG/DL
LDLC SERPL CALC-MCNC: 66 MG/DL
NONHDLC SERPL-MCNC: 91 MG/DL
TRIGL SERPL-MCNC: 124 MG/DL

## 2018-10-22 PROCEDURE — 36415 COLL VENOUS BLD VENIPUNCTURE: CPT | Performed by: INTERNAL MEDICINE

## 2018-10-22 PROCEDURE — 80061 LIPID PANEL: CPT | Performed by: INTERNAL MEDICINE

## 2018-10-22 NOTE — PROGRESS NOTES
Pt rosuvastatin increased to 40mg at last OV, lipid panel re-drawn today, no scheduled f/up appointments. Results routed to Dr Tobias.

## 2018-10-23 NOTE — PROGRESS NOTES
Reply from Dr. Tobias: FUP in 1 year is fine thanks. Orders placed for annual visit with bmp and lipids.  Contacted patient to review lipid panel as showing good results with crestor 40mg daily. Patient to continue current medications and return for annual visit. Patient verbalized understanding and agreed with plan.

## 2018-11-12 DIAGNOSIS — I10 BENIGN ESSENTIAL HYPERTENSION: ICD-10-CM

## 2018-11-12 RX ORDER — AMLODIPINE BESYLATE 10 MG/1
10 TABLET ORAL DAILY
Qty: 90 TABLET | Refills: 1 | Status: SHIPPED | OUTPATIENT
Start: 2018-11-12 | End: 2019-07-08

## 2019-01-21 DIAGNOSIS — I25.10 CORONARY ARTERY DISEASE INVOLVING NATIVE CORONARY ARTERY OF NATIVE HEART WITHOUT ANGINA PECTORIS: ICD-10-CM

## 2019-01-21 RX ORDER — NITROGLYCERIN 0.4 MG/1
0.4 TABLET SUBLINGUAL EVERY 5 MIN PRN
Qty: 25 TABLET | Refills: 1 | Status: SHIPPED | OUTPATIENT
Start: 2019-01-21 | End: 2019-10-29

## 2019-07-08 DIAGNOSIS — I10 BENIGN ESSENTIAL HYPERTENSION: ICD-10-CM

## 2019-07-08 RX ORDER — AMLODIPINE BESYLATE 10 MG/1
10 TABLET ORAL DAILY
Qty: 90 TABLET | Refills: 1 | Status: SHIPPED | OUTPATIENT
Start: 2019-07-08 | End: 2022-02-07

## 2019-10-21 ENCOUNTER — APPOINTMENT (OUTPATIENT)
Dept: GENERAL RADIOLOGY | Facility: CLINIC | Age: 54
DRG: 303 | End: 2019-10-21
Attending: EMERGENCY MEDICINE
Payer: COMMERCIAL

## 2019-10-21 ENCOUNTER — HOSPITAL ENCOUNTER (INPATIENT)
Facility: CLINIC | Age: 54
LOS: 1 days | Discharge: HOME | DRG: 303 | End: 2019-10-22
Attending: EMERGENCY MEDICINE | Admitting: INTERNAL MEDICINE
Payer: COMMERCIAL

## 2019-10-21 ENCOUNTER — APPOINTMENT (OUTPATIENT)
Dept: CARDIOLOGY | Facility: CLINIC | Age: 54
DRG: 303 | End: 2019-10-21
Attending: NURSE PRACTITIONER
Payer: COMMERCIAL

## 2019-10-21 DIAGNOSIS — I25.110 CORONARY ARTERY DISEASE INVOLVING NATIVE CORONARY ARTERY OF NATIVE HEART WITH UNSTABLE ANGINA PECTORIS (H): ICD-10-CM

## 2019-10-21 DIAGNOSIS — I20.0 UNSTABLE ANGINA PECTORIS (H): ICD-10-CM

## 2019-10-21 DIAGNOSIS — I25.110 CORONARY ARTERY DISEASE INVOLVING NATIVE CORONARY ARTERY OF NATIVE HEART WITH UNSTABLE ANGINA PECTORIS (H): Primary | ICD-10-CM

## 2019-10-21 DIAGNOSIS — R07.9 CHEST PAIN, UNSPECIFIED TYPE: ICD-10-CM

## 2019-10-21 PROBLEM — I25.9 CHEST PAIN DUE TO MYOCARDIAL ISCHEMIA: Status: ACTIVE | Noted: 2019-10-21

## 2019-10-21 LAB
ALBUMIN SERPL-MCNC: 4.1 G/DL (ref 3.4–5)
ALP SERPL-CCNC: 80 U/L (ref 40–150)
ALT SERPL W P-5'-P-CCNC: 24 U/L (ref 0–50)
ANION GAP SERPL CALCULATED.3IONS-SCNC: 6 MMOL/L (ref 3–14)
AST SERPL W P-5'-P-CCNC: 18 U/L (ref 0–45)
BASOPHILS # BLD AUTO: 0 10E9/L (ref 0–0.2)
BASOPHILS NFR BLD AUTO: 0.4 %
BILIRUB SERPL-MCNC: 0.5 MG/DL (ref 0.2–1.3)
BUN SERPL-MCNC: 10 MG/DL (ref 7–30)
CALCIUM SERPL-MCNC: 9.5 MG/DL (ref 8.5–10.1)
CHLORIDE SERPL-SCNC: 108 MMOL/L (ref 94–109)
CO2 SERPL-SCNC: 27 MMOL/L (ref 20–32)
CREAT SERPL-MCNC: 0.82 MG/DL (ref 0.52–1.04)
DIFFERENTIAL METHOD BLD: NORMAL
EOSINOPHIL # BLD AUTO: 0.5 10E9/L (ref 0–0.7)
EOSINOPHIL NFR BLD AUTO: 6.1 %
ERYTHROCYTE [DISTWIDTH] IN BLOOD BY AUTOMATED COUNT: 13.4 % (ref 10–15)
GFR SERPL CREATININE-BSD FRML MDRD: 81 ML/MIN/{1.73_M2}
GLUCOSE SERPL-MCNC: 97 MG/DL (ref 70–99)
HBA1C MFR BLD: 5.8 % (ref 0–5.6)
HCT VFR BLD AUTO: 41.3 % (ref 35–47)
HGB BLD-MCNC: 13.6 G/DL (ref 11.7–15.7)
IMM GRANULOCYTES # BLD: 0 10E9/L (ref 0–0.4)
IMM GRANULOCYTES NFR BLD: 0.1 %
INTERPRETATION ECG - MUSE: NORMAL
INTERPRETATION ECG - MUSE: NORMAL
LMWH PPP CHRO-ACNC: 0.18 IU/ML
LYMPHOCYTES # BLD AUTO: 2.7 10E9/L (ref 0.8–5.3)
LYMPHOCYTES NFR BLD AUTO: 37 %
MCH RBC QN AUTO: 27.5 PG (ref 26.5–33)
MCHC RBC AUTO-ENTMCNC: 32.9 G/DL (ref 31.5–36.5)
MCV RBC AUTO: 83 FL (ref 78–100)
MONOCYTES # BLD AUTO: 0.6 10E9/L (ref 0–1.3)
MONOCYTES NFR BLD AUTO: 8.4 %
NEUTROPHILS # BLD AUTO: 3.6 10E9/L (ref 1.6–8.3)
NEUTROPHILS NFR BLD AUTO: 48 %
NRBC # BLD AUTO: 0 10*3/UL
NRBC BLD AUTO-RTO: 0 /100
PLATELET # BLD AUTO: 304 10E9/L (ref 150–450)
POTASSIUM SERPL-SCNC: 3.5 MMOL/L (ref 3.4–5.3)
PROT SERPL-MCNC: 8.1 G/DL (ref 6.8–8.8)
RBC # BLD AUTO: 4.95 10E12/L (ref 3.8–5.2)
SODIUM SERPL-SCNC: 141 MMOL/L (ref 133–144)
TROPONIN I SERPL-MCNC: <0.015 UG/L (ref 0–0.04)
TROPONIN I SERPL-MCNC: <0.015 UG/L (ref 0–0.04)
WBC # BLD AUTO: 7.4 10E9/L (ref 4–11)

## 2019-10-21 PROCEDURE — 36415 COLL VENOUS BLD VENIPUNCTURE: CPT | Performed by: NURSE PRACTITIONER

## 2019-10-21 PROCEDURE — 99222 1ST HOSP IP/OBS MODERATE 55: CPT | Mod: 25 | Performed by: INTERNAL MEDICINE

## 2019-10-21 PROCEDURE — 99285 EMERGENCY DEPT VISIT HI MDM: CPT | Mod: 25

## 2019-10-21 PROCEDURE — 71046 X-RAY EXAM CHEST 2 VIEWS: CPT

## 2019-10-21 PROCEDURE — 36415 COLL VENOUS BLD VENIPUNCTURE: CPT | Performed by: INTERNAL MEDICINE

## 2019-10-21 PROCEDURE — 25000128 H RX IP 250 OP 636: Performed by: NURSE PRACTITIONER

## 2019-10-21 PROCEDURE — 84484 ASSAY OF TROPONIN QUANT: CPT | Performed by: NURSE PRACTITIONER

## 2019-10-21 PROCEDURE — 93005 ELECTROCARDIOGRAM TRACING: CPT

## 2019-10-21 PROCEDURE — G0378 HOSPITAL OBSERVATION PER HR: HCPCS

## 2019-10-21 PROCEDURE — 93306 TTE W/DOPPLER COMPLETE: CPT | Mod: 26 | Performed by: INTERNAL MEDICINE

## 2019-10-21 PROCEDURE — 99223 1ST HOSP IP/OBS HIGH 75: CPT | Mod: AI | Performed by: NURSE PRACTITIONER

## 2019-10-21 PROCEDURE — 21000001 ZZH R&B HEART CARE

## 2019-10-21 PROCEDURE — 25000132 ZZH RX MED GY IP 250 OP 250 PS 637: Performed by: EMERGENCY MEDICINE

## 2019-10-21 PROCEDURE — 83036 HEMOGLOBIN GLYCOSYLATED A1C: CPT | Performed by: EMERGENCY MEDICINE

## 2019-10-21 PROCEDURE — 25000132 ZZH RX MED GY IP 250 OP 250 PS 637: Performed by: NURSE PRACTITIONER

## 2019-10-21 PROCEDURE — 80053 COMPREHEN METABOLIC PANEL: CPT | Performed by: EMERGENCY MEDICINE

## 2019-10-21 PROCEDURE — 96365 THER/PROPH/DIAG IV INF INIT: CPT

## 2019-10-21 PROCEDURE — 25500064 ZZH RX 255 OP 636: Performed by: INTERNAL MEDICINE

## 2019-10-21 PROCEDURE — 85520 HEPARIN ASSAY: CPT | Performed by: INTERNAL MEDICINE

## 2019-10-21 PROCEDURE — 84484 ASSAY OF TROPONIN QUANT: CPT | Performed by: EMERGENCY MEDICINE

## 2019-10-21 PROCEDURE — 40000264 ECHOCARDIOGRAM COMPLETE

## 2019-10-21 PROCEDURE — 85025 COMPLETE CBC W/AUTO DIFF WBC: CPT | Performed by: EMERGENCY MEDICINE

## 2019-10-21 RX ORDER — SODIUM CHLORIDE 9 MG/ML
INJECTION, SOLUTION INTRAVENOUS CONTINUOUS
Status: DISCONTINUED | OUTPATIENT
Start: 2019-10-21 | End: 2019-10-22

## 2019-10-21 RX ORDER — LIDOCAINE 40 MG/G
CREAM TOPICAL
Status: DISCONTINUED | OUTPATIENT
Start: 2019-10-21 | End: 2019-10-22

## 2019-10-21 RX ORDER — ROSUVASTATIN CALCIUM 20 MG/1
40 TABLET, COATED ORAL DAILY
Status: DISCONTINUED | OUTPATIENT
Start: 2019-10-21 | End: 2019-10-22 | Stop reason: HOSPADM

## 2019-10-21 RX ORDER — LOSARTAN POTASSIUM 100 MG/1
100 TABLET ORAL DAILY
Status: DISCONTINUED | OUTPATIENT
Start: 2019-10-21 | End: 2019-10-22 | Stop reason: HOSPADM

## 2019-10-21 RX ORDER — LORAZEPAM 2 MG/ML
0.5 INJECTION INTRAMUSCULAR
Status: DISCONTINUED | OUTPATIENT
Start: 2019-10-21 | End: 2019-10-22 | Stop reason: HOSPADM

## 2019-10-21 RX ORDER — ACETAMINOPHEN 650 MG/1
650 SUPPOSITORY RECTAL EVERY 4 HOURS PRN
Status: DISCONTINUED | OUTPATIENT
Start: 2019-10-21 | End: 2019-10-22 | Stop reason: HOSPADM

## 2019-10-21 RX ORDER — ALBUTEROL SULFATE 90 UG/1
1-2 AEROSOL, METERED RESPIRATORY (INHALATION) EVERY 4 HOURS PRN
COMMUNITY

## 2019-10-21 RX ORDER — ASPIRIN 81 MG/1
162 TABLET, CHEWABLE ORAL ONCE
Status: COMPLETED | OUTPATIENT
Start: 2019-10-21 | End: 2019-10-21

## 2019-10-21 RX ORDER — METOPROLOL SUCCINATE 25 MG/1
25 TABLET, EXTENDED RELEASE ORAL DAILY
Status: DISCONTINUED | OUTPATIENT
Start: 2019-10-21 | End: 2019-10-22 | Stop reason: HOSPADM

## 2019-10-21 RX ORDER — POTASSIUM CHLORIDE 1500 MG/1
20 TABLET, EXTENDED RELEASE ORAL
Status: COMPLETED | OUTPATIENT
Start: 2019-10-21 | End: 2019-10-22

## 2019-10-21 RX ORDER — ACETAMINOPHEN 325 MG/1
650 TABLET ORAL EVERY 4 HOURS PRN
Status: DISCONTINUED | OUTPATIENT
Start: 2019-10-21 | End: 2019-10-22 | Stop reason: HOSPADM

## 2019-10-21 RX ORDER — ASPIRIN 81 MG/1
81 TABLET ORAL DAILY
Status: DISCONTINUED | OUTPATIENT
Start: 2019-10-22 | End: 2019-10-22 | Stop reason: HOSPADM

## 2019-10-21 RX ORDER — HEPARIN SODIUM 10000 [USP'U]/100ML
850 INJECTION, SOLUTION INTRAVENOUS CONTINUOUS
Status: DISCONTINUED | OUTPATIENT
Start: 2019-10-21 | End: 2019-10-22

## 2019-10-21 RX ORDER — NITROGLYCERIN 0.4 MG/1
0.4 TABLET SUBLINGUAL EVERY 5 MIN PRN
Status: COMPLETED | OUTPATIENT
Start: 2019-10-21 | End: 2019-10-21

## 2019-10-21 RX ORDER — LORAZEPAM 0.5 MG/1
0.5 TABLET ORAL
Status: DISCONTINUED | OUTPATIENT
Start: 2019-10-21 | End: 2019-10-22 | Stop reason: HOSPADM

## 2019-10-21 RX ORDER — NITROGLYCERIN 0.4 MG/1
0.4 TABLET SUBLINGUAL EVERY 5 MIN PRN
Status: DISCONTINUED | OUTPATIENT
Start: 2019-10-21 | End: 2019-10-22 | Stop reason: HOSPADM

## 2019-10-21 RX ORDER — ALUMINA, MAGNESIA, AND SIMETHICONE 2400; 2400; 240 MG/30ML; MG/30ML; MG/30ML
30 SUSPENSION ORAL EVERY 4 HOURS PRN
Status: DISCONTINUED | OUTPATIENT
Start: 2019-10-21 | End: 2019-10-22 | Stop reason: HOSPADM

## 2019-10-21 RX ORDER — AMLODIPINE BESYLATE 10 MG/1
10 TABLET ORAL DAILY
Status: DISCONTINUED | OUTPATIENT
Start: 2019-10-21 | End: 2019-10-22 | Stop reason: HOSPADM

## 2019-10-21 RX ORDER — NALOXONE HYDROCHLORIDE 0.4 MG/ML
.1-.4 INJECTION, SOLUTION INTRAMUSCULAR; INTRAVENOUS; SUBCUTANEOUS
Status: DISCONTINUED | OUTPATIENT
Start: 2019-10-21 | End: 2019-10-22 | Stop reason: HOSPADM

## 2019-10-21 RX ORDER — ASPIRIN 81 MG/1
81 TABLET ORAL DAILY
COMMUNITY
End: 2022-02-07

## 2019-10-21 RX ORDER — LIDOCAINE 40 MG/G
CREAM TOPICAL
Status: DISCONTINUED | OUTPATIENT
Start: 2019-10-21 | End: 2019-10-22 | Stop reason: HOSPADM

## 2019-10-21 RX ADMIN — NITROGLYCERIN 0.4 MG: 0.4 TABLET SUBLINGUAL at 21:38

## 2019-10-21 RX ADMIN — Medication 4000 UNITS: at 16:11

## 2019-10-21 RX ADMIN — NITROGLYCERIN 0.4 MG: 0.4 TABLET SUBLINGUAL at 10:07

## 2019-10-21 RX ADMIN — NITROGLYCERIN 0.4 MG: 0.4 TABLET SUBLINGUAL at 09:47

## 2019-10-21 RX ADMIN — LOSARTAN POTASSIUM 100 MG: 100 TABLET, FILM COATED ORAL at 15:16

## 2019-10-21 RX ADMIN — NITROGLYCERIN 0.4 MG: 0.4 TABLET SUBLINGUAL at 10:25

## 2019-10-21 RX ADMIN — AMLODIPINE BESYLATE 10 MG: 10 TABLET ORAL at 15:16

## 2019-10-21 RX ADMIN — ROSUVASTATIN CALCIUM 40 MG: 20 TABLET, FILM COATED ORAL at 15:18

## 2019-10-21 RX ADMIN — METOPROLOL SUCCINATE 25 MG: 25 TABLET, EXTENDED RELEASE ORAL at 15:16

## 2019-10-21 RX ADMIN — ASPIRIN 81 MG 162 MG: 81 TABLET ORAL at 09:47

## 2019-10-21 RX ADMIN — ALUMINUM HYDROXIDE, MAGNESIUM HYDROXIDE, AND DIMETHICONE 30 ML: 400; 400; 40 SUSPENSION ORAL at 21:49

## 2019-10-21 RX ADMIN — HEPARIN SODIUM 850 UNITS/HR: 10000 INJECTION, SOLUTION INTRAVENOUS at 16:10

## 2019-10-21 RX ADMIN — HUMAN ALBUMIN MICROSPHERES AND PERFLUTREN 9 ML: 10; .22 INJECTION, SOLUTION INTRAVENOUS at 14:36

## 2019-10-21 ASSESSMENT — ENCOUNTER SYMPTOMS
NECK PAIN: 1
DYSURIA: 0
DIAPHORESIS: 1
ABDOMINAL PAIN: 0
NAUSEA: 1
ARTHRALGIAS: 1
FATIGUE: 1
VOMITING: 0

## 2019-10-21 ASSESSMENT — ACTIVITIES OF DAILY LIVING (ADL): ADLS_ACUITY_SCORE: 10

## 2019-10-21 NOTE — PHARMACY-ADMISSION MEDICATION HISTORY
Admission medication history interview status for the 10/21/2019  admission is complete. See EPIC admission navigator for prior to admission medications     Medication history source reliability:Good    Actions taken by pharmacist (provider contacted, etc): called her home health RN Adeline [phone] to confirm her med list, sure scripts, patient had a picture of her med bottles on her phone     Additional medication history information not noted on PTA med list :None    Medication reconciliation/reorder completed by provider prior to medication history? No    Time spent in this activity: 25 min    Prior to Admission medications    Medication Sig Last Dose Taking? Auth Provider   albuterol (PROAIR HFA/PROVENTIL HFA/VENTOLIN HFA) 108 (90 Base) MCG/ACT inhaler Inhale 1-2 puffs into the lungs every 4 hours as needed for shortness of breath / dyspnea or wheezing prn Yes Unknown, Entered By History   amLODIPine (NORVASC) 10 MG tablet Take 1 tablet (10 mg) by mouth daily 10/20/2019 at Unknown time Yes Mica Tobias MD   aspirin 81 MG EC tablet Take 81 mg by mouth daily 10/20/2019 at Unknown time Yes Unknown, Entered By History   losartan (COZAAR) 100 MG tablet Take 1 tablet (100 mg) by mouth daily 10/20/2019 at Unknown time Yes Mica Tobias MD   metoprolol (TOPROL-XL) 25 MG 24 hr tablet Take 25 mg by mouth daily. 10/20/2019 at Unknown time Yes Reported, Patient   nitroGLYcerin (NITROSTAT) 0.4 MG sublingual tablet Place 1 tablet (0.4 mg) under the tongue every 5 minutes as needed for chest pain prn Yes Mica Tobias MD   omeprazole (PRILOSEC) 20 MG DR capsule Take 20 mg by mouth daily 10/20/2019 at Unknown time Yes Unknown, Entered By History   rosuvastatin (CRESTOR) 40 MG tablet Take 1 tablet (40 mg) by mouth daily 10/20/2019 at Unknown time Yes Mica Tobias MD

## 2019-10-21 NOTE — ED TRIAGE NOTES
"54-year-old female presents to the ER with complaints of left sided chest pain that has been present \"for awhile\". Pt had been working out but stopped two weeks ago because she thought she was over doing it. Pt feels more fatigued, having hot flashes and nauseated. Pt has taken nitroglycerin the last three nights, thinks it may have helped the pain.    "

## 2019-10-21 NOTE — PROGRESS NOTES
BRIEF HOSPITALIST NOTE    Case discussed with Dr. Aldridge from utilization review and patient meets for inpatient status. Transfer orders placed, please refer to my H&P for plan.    JOHNNY Lawson CNP  Text Page

## 2019-10-21 NOTE — ED NOTES
"[facility]  ED Nurse Handoff Report    ED Chief complaint: Chest Pain and Fatigue      ED Diagnosis:   Final diagnoses:   Chest pain, unspecified type       Code Status: Full Code    Allergies:   Allergies   Allergen Reactions     Imdur [Isosorbide] Other (See Comments)     Headache.  Patient took 30mg dose for 2 days and had HA on consecutive days.     Morphine Sulfate Nausea and Vomiting       Activity level - Baseline/Home:  Independent  Activity Level - Current:   Independent    Patient's Preferred language: English   Needed?: No    Isolation: No  Infection: Not Applicable  Bariatric?: No    Vital Signs:   Vitals:    10/21/19 0945 10/21/19 0950 10/21/19 0955 10/21/19 1005   BP: (!) 155/91 (!) 143/88 130/82 (!) 146/83   Pulse: 64 79 69 66   Resp: 9 18 18 13   Temp:       TempSrc:       SpO2:  100% 97%    Height:           Cardiac Rhythm: ,        Pain level: 0-10 Pain Scale: 8    Is this patient confused?: No   Does this patient have a guardian?  No         If yes, is there guardianship documents in the Epic \"Code/ACP\" activity?  N/A         Guardian Notified?  N/A  [place] - Suicide Severity Rating Scale Completed?  Yes  If yes, what color did the patient score?  White    Patient Report: Initial Complaint: C/O CP  Focused Assessment: Alert and oriented.  Hypertensive but other VSS.  C/O CP decreased from 7/10 to a 4/10 after 3 doses of nitrogylcerin.  Pt states that the pain has been intermittent but she has also felt very fatigued and diaphoretic at times.  Tests Performed: labs, ekg, xray  Abnormal Results:   Results for orders placed or performed during the hospital encounter of 10/21/19   XR Chest 2 Views    Narrative    CHEST TWO VIEWS  10/21/2019 10:02 AM     HISTORY:  Chest pain.    COMPARISON: 4/3/2017.      Impression    IMPRESSION: Negative chest. Lungs clear.    KOLE WILLIS MD   CBC with platelets differential   Result Value Ref Range    WBC 7.4 4.0 - 11.0 10e9/L    " RBC Count 4.95 3.8 - 5.2 10e12/L    Hemoglobin 13.6 11.7 - 15.7 g/dL    Hematocrit 41.3 35.0 - 47.0 %    MCV 83 78 - 100 fl    MCH 27.5 26.5 - 33.0 pg    MCHC 32.9 31.5 - 36.5 g/dL    RDW 13.4 10.0 - 15.0 %    Platelet Count 304 150 - 450 10e9/L    Diff Method Automated Method     % Neutrophils 48.0 %    % Lymphocytes 37.0 %    % Monocytes 8.4 %    % Eosinophils 6.1 %    % Basophils 0.4 %    % Immature Granulocytes 0.1 %    Nucleated RBCs 0 0 /100    Absolute Neutrophil 3.6 1.6 - 8.3 10e9/L    Absolute Lymphocytes 2.7 0.8 - 5.3 10e9/L    Absolute Monocytes 0.6 0.0 - 1.3 10e9/L    Absolute Eosinophils 0.5 0.0 - 0.7 10e9/L    Absolute Basophils 0.0 0.0 - 0.2 10e9/L    Abs Immature Granulocytes 0.0 0 - 0.4 10e9/L    Absolute Nucleated RBC 0.0    Comprehensive metabolic panel   Result Value Ref Range    Sodium 141 133 - 144 mmol/L    Potassium 3.5 3.4 - 5.3 mmol/L    Chloride 108 94 - 109 mmol/L    Carbon Dioxide 27 20 - 32 mmol/L    Anion Gap 6 3 - 14 mmol/L    Glucose 97 70 - 99 mg/dL    Urea Nitrogen 10 7 - 30 mg/dL    Creatinine 0.82 0.52 - 1.04 mg/dL    GFR Estimate 81 >60 mL/min/[1.73_m2]    GFR Estimate If Black >90 >60 mL/min/[1.73_m2]    Calcium 9.5 8.5 - 10.1 mg/dL    Bilirubin Total 0.5 0.2 - 1.3 mg/dL    Albumin 4.1 3.4 - 5.0 g/dL    Protein Total 8.1 6.8 - 8.8 g/dL    Alkaline Phosphatase 80 40 - 150 U/L    ALT 24 0 - 50 U/L    AST 18 0 - 45 U/L   Troponin I   Result Value Ref Range    Troponin I ES <0.015 0.000 - 0.045 ug/L     Treatments provided: nitroglycerin x 3    Family Comments: NA    OBS brochure/video discussed/provided to patient/family: Yes              Name of person given brochure if not patient: NA              Relationship to patient: NA    ED Medications:   Medications   aspirin (ASA) chewable tablet 162 mg (162 mg Oral Given 10/21/19 3430)   nitroGLYcerin (NITROSTAT) sublingual tablet 0.4 mg (0.4 mg Sublingual Given 10/21/19 1025)       Drips infusing?:  No    For the majority of the shift  this patient was Green.   Interventions performed were NA.    Severe Sepsis OR Septic Shock Diagnosis Present: No    To be done/followed up on inpatient unit:  NA    ED NURSE PHONE NUMBER: [id number]

## 2019-10-21 NOTE — CONSULTS
Inpatient Cardiology Consultation   [facility]  Date of Admission:10/21/2019  Date of Consult: 10/21/2019    Inpatient cardiology consultation note has been dictated. Dictation number [id number]      Taylor Adair MD [facility]  Cardiology        REVIEW OF SYSTEMS:  A comprehensive 10 point review of systems was completed and the pertinent positives are documented in history of present illness.    MEDICATIONS:  Prior to Admission Medications   Prescriptions Last Dose Informant Patient Reported? Taking?   albuterol (PROAIR HFA/PROVENTIL HFA/VENTOLIN HFA) 108 (90 Base) MCG/ACT inhaler prn Care Giver Yes Yes   Sig: Inhale 1-2 puffs into the lungs every 4 hours as needed for shortness of breath / dyspnea or wheezing   amLODIPine (NORVASC) 10 MG tablet 10/20/2019 at Unknown time Care Giver No Yes   Sig: Take 1 tablet (10 mg) by mouth daily   aspirin 81 MG EC tablet 10/20/2019 at Unknown time Care Giver Yes Yes   Sig: Take 81 mg by mouth daily   losartan (COZAAR) 100 MG tablet 10/20/2019 at Unknown time Care Giver No Yes   Sig: Take 1 tablet (100 mg) by mouth daily   metoprolol (TOPROL-XL) 25 MG 24 hr tablet 10/20/2019 at Unknown time Care Giver Yes Yes   Sig: Take 25 mg by mouth daily.   nitroGLYcerin (NITROSTAT) 0.4 MG sublingual tablet prn Care Giver No Yes   Sig: Place 1 tablet (0.4 mg) under the tongue every 5 minutes as needed for chest pain   omeprazole (PRILOSEC) 20 MG DR capsule 10/20/2019 at Unknown time Care Giver Yes Yes   Sig: Take 20 mg by mouth daily   rosuvastatin (CRESTOR) 40 MG tablet 10/20/2019 at Unknown time Care Giver No Yes   Sig: Take 1 tablet (40 mg) by mouth daily      Facility-Administered Medications: None       ALLERGIES:  Allergies   Allergen Reactions     Imdur [Isosorbide] Other (See Comments)     Headache.  Patient took 30mg dose for 2 days and had HA on consecutive days.     Morphine Sulfate Nausea and Vomiting       PAST MEDICAL HISTORY:  Past Medical History:   Diagnosis  Date     Asthma      Coronary artery disease     cardiac cath 2011: JUDY to LAD and 1st diagonal     Fibromuscular dysplasia (H)      GERD (gastroesophageal reflux disease)      Hyperlipidemia LDL goal <70      Hypertension      MI (myocardial infarction) (H) 8/11     Peripheral vascular disease (H) 7/12    angioplasty bilat renal arteries     Renal artery stenosis (H)     Bilateral moderate renal stenosis noted on 12/20/13 renal ultrasound     Sleep apnea     mild per sleep study [facility] 7-10-15       PAST SURGICAL HISTORY:  Past Surgical History:   Procedure Laterality Date     COLONOSCOPY N/A 1/13/2015    Procedure: COLONOSCOPY;  Surgeon: Juan Miguel Garcia MD;  Location:  GI     GYN SURGERY       HYSTERECTOMY      partial       SOCIAL HISTORY:   Glenis Ni  reports that she has quit smoking. She quit smokeless tobacco use about 23 years ago. She reports current alcohol use. She reports that she does not use drugs.    FAMILY HISTORY:  Family History   Problem Relation Age of Onset     Heart Disease Mother        PHYSICAL EXAMINATION:  Temp: 97.1  F (36.2  C) Temp src: Oral BP: 127/69 Pulse: 61 Heart Rate: 64 Resp: 18 SpO2: 98 % O2 Device: None (Room air)    No intake/output data recorded.  There were no vitals filed for this visit.    Constitutional: Comfortable at rest. Cooperative, alert and oriented, well developed, well nourished.  Eyes: Pupils equal and round, conjunctivae and lids unremarkable, sclera white, no xanthalasma,   ENT: Satisfactory dentition.  No cyanosis or pallor.  Neck: No thyromegaly.     Respiratory: Normal respiratory effort with symmetrical chest wall movements and no use of accessory muscles. Bilateral normal breath sounds. No rales or wheeze.  Cardiovascular: Normal jugular venous pulse and pressure.  Normal carotid pulse character and volume.  No carotid bruit.  Apical impulsenot palpable due to body habitus.  Heart sounds are normal and regular.  No S3 or S4.  No  audible murmur or friction rub.  GI: Soft, nontender, no hepatosplenomegaly, no masses, active bowel sounds.    Skin: No rash, erythema, ecchymosis.  Musculoskeletal: Normal muscle tone and strength. No spinal deformities.  Neuropsychiatric: Oriented to time place and person.  Affect normal.  No gross motor deficits.  Extremities: No edema, clubbing or deformities.    Taylor Adair MD

## 2019-10-21 NOTE — PLAN OF CARE
PRIMARY DIAGNOSIS: CHEST PAIN  OUTPATIENT/OBSERVATION GOALS TO BE MET BEFORE DISCHARGE:  1. Ruled out acute coronary syndrome (negative or stable Troponin):  Yes  2. Pain Status: Pain free.  3. Appropriate provocative testing performed: No  - Stress Test Procedure: other - procedure tues 10/22  - Interpretation of cardiac rhythm per telemetry tech: SR    4. Cleared by Consultants (if applicable):No  5. Return to near baseline physical activity: Yes  Discharge Planner Nurse   Safe discharge environment identified: No  Barriers to discharge: Yes       Entered by: Cris Tucker 10/21/2019 4:28 PM     Please review provider order for any additional goals.   Nurse to notify provider when observation goals have been met and patient is ready for discharge.

## 2019-10-21 NOTE — CONSULTS
Care Transition Initial Assessment - SW     Met with: Patient, SUDHA VANCE   Active Problems:    Coronary artery disease    Fibromuscular dysplasia (H)    Renal artery stenosis (H)    Chest pain due to myocardial ischemia       DATA  Lives With: friend(s)      Quality of Family Relationships: supportive, involved  Description of Support System: Involved, Supportive  Who is your support system?: Children, PCA, Friends, ILS, HHA,   Support Assessment: Adequate family and caregiver support.   Identified issues/concerns regarding health management: Discharge planning.     Quality of Family Relationships: supportive, involved  Transportation Anticipated: family or friend or PCA will provide    ASSESSMENT  Cognitive Status:  awake, alert and oriented  Concerns to be addressed: Per SW consult for discharge planning. Pt admitted 10/21/19 with chest pain. discharge pending medical clearance. Pt lives in an apartment with a live-in-aid / friend Louis. Patient has RN CM with [facility], Christy Johnson RN, [phone]. Patient has a CADI CM with [facility]ido [phone]. SW spoke with CADI CM who informs that [facility] worker[facility], Skilled RN. Pt has a daughter who is in [place] for college. Spoke with pt who informs she feels safe and supported at home, has many services in place. Pt informs that she anticipates Louis or another staff will transport her at discharge. No additional needs or concerns noted.      PLAN  Financial costs for the patient includes: N/A  Patient given options and choices for discharge: TBD - anticipate home with resumption of prior services.  Patient/family is agreeable to the plan?  Yes  Transportation/person available to transport on day of discharge is PCA / friend and have they been notified/set up.  Patient Goals and Preferences: anticipate home with resumption of prior services.  Patient anticipates discharging to: anticipate home with resumption of prior  services.      Chey Bowling, TYLER   [phone]  [facility]

## 2019-10-21 NOTE — PLAN OF CARE
Pt a&o, up indep in room, denies pain, ate well at dinner.  Pt to transfer inpt, report given, pt walking from obs to heart center to get her walk in.  Pt sitting up in chair for most of day since arrival.  Plans for pt to have intervention tomorrow, tues.  Continue to monitor and POC

## 2019-10-21 NOTE — LETTER
10/21/19      To Whom it may concern:    Louis Barger was in our Emergency Department today, 10/21/19. with a patient who needed his assistance.  Please excuse them from work.      Sincerely,

## 2019-10-21 NOTE — ED PROVIDER NOTES
"  History     Chief Complaint:  Chest Pain and Fatigue      HPI   Glenis Ni is a 54 year old female with a history of coronary artery disease and myocardial infarction s/p LAD stent placement 2011, hypertension, fibromuscular dysplasia, and peripheral vascular disease who presents with chest pain and fatigue. Glenis presents to the emergency department complaining of leg sided chest pain present for \"a while\" with associated nausea and fatigue x1 week. With her previous myocardial infarction, she was more fatigued and nauseous as well, which is concerning for her. The pain radiates from her left chest up into her left shoulder and neck. The pain is actually more of a \"tightness\" per the patient. With exertion, she feels more diaphoretic and fatigued. For the last three nights, she has taken Nitroglycerin and thinks that this may have helped the pain. Denies abdominal pain, dysuria, and emesis.    Cardiac/PE/DVT Risk Factors:  History of hypertension - positive   History of hyperlipidemia - negative   History of diabetes - negative   History of smoking - positive   Personal history of PE/DVT - negative   Family history of PE/DVT - negative   Family history of heart complications - negative   Recent travel - negative   Recent surgery - negative   Other immobilizations - negative   Cancer - negative     Allergies:  Imdur  Morphine sulfate    Medications:    Norvasc  Nitrostat  Cozaar  Aldactone  Crestor  Toprol-XL  Omeprazole  Prilosec  Ventolin    Past Medical History:    Hypertension   Gastroesophageal reflux disease   Sleep apnea  Renal artery stenosis  Coronary artery disease    Myocardial infarction    Peripheral vascular disease  Fibromuscular dysplasia  Asthma    Past Surgical History:    Tubal ligation  Hysterectomy, partial  Angioplasty    Family History:    Ovarian tumor  Cataracts  Breast cancer    Social History:  Smoking status: Former  Alcohol use: Rare  Marital Status:  Single     Review of " "Systems   Constitutional: Positive for diaphoresis and fatigue.   Cardiovascular: Positive for chest pain.   Gastrointestinal: Positive for nausea. Negative for abdominal pain and vomiting.   Genitourinary: Negative for dysuria.   Musculoskeletal: Positive for arthralgias and neck pain.   All other systems reviewed and are negative.        Physical Exam     Patient Vitals for the past 24 hrs:   BP Temp Temp src Pulse Resp SpO2 Height   10/21/19 0859 (!) 157/77 98  F (36.7  C) Temporal 68 18 100 % 1.626 m (5' 4\")      Physical Exam  Vitals: reviewed by me  General: Pt seen on \[facility]\"", pleasant, cooperative, and alert to conversation  Eyes: Tracking well, clear conjunctiva BL  ENT: MMM, midline trachea.   Lungs:  No tachypnea, no accessory muscle use. No respiratory distress.   CV: Rate as above, regular rhythm.    Abd: Soft, non tender, no guarding, no rebound. Non distended  MSK: no peripheral edema or joint effusion.  No evidence of trauma  Skin: No rash, normal turgor and temperature  Neuro: Clear speech and no facial droop.  Psych: Not RIS, no e/o AH/VH    Emergency Department Course     ECG:  Indication: Chest pain   Completed at 0905.  Read at 0920.   Normal sinus rhythm   Low voltage QRS  Right bundle branch block  Abnormal ECG   Rate 69 bpm. FL interval 184. QRS duration 116. QT/QTc 426/456. P-R-T axes 67 60 -12.    Imaging:  Radiology findings were communicated with the patient and Admitting MD who voiced understanding of the findings.    XR Chest 2 Views  Final Result  IMPRESSION: Negative chest. Lungs clear.  KOLE WILLIS MD    Laboratory:  Laboratory findings were communicated with the patient and Admitting MD who voiced understanding of the findings.    CBC: AWNL. (WBC 7.4, HGB 13.6, )   CMP: AWNL (Creatinine 0.82)    Troponin (Collected 0943): <0.015     Procedures:  None     Interventions:  0947 - ASA 162mg PO   0947 - Nitroglycerin 0.4mg SL   1007 - Nitroglycerin 0.4mg SL   1025 - " Nitroglycerin 0.4mg SL     Emergency Department Course:  Past medical records, nursing notes, and vitals reviewed.    0940: I performed an exam of the patient as documented above.     EKG obtained in the ED, see results above.   IV was inserted and blood was drawn for laboratory testing, results above.  The patient was sent for a CXR while in the emergency department, results above.     1045: Patient rechecked and updated.      1114: I spoke with Dr. Aldrich of the Hospitalist service regarding patient's presentation, findings, and plan of care.    Findings and plan explained to the Patient who consents to admission. Discussed the patient with Dr. Aldrich, who will admit the patient to a observation bed for further monitoring, evaluation, and treatment.     I personally reviewed the laboratory and imaging results with the Patient and answered all related questions prior to admission.      Impression & Plan     Medical Decision Making:  Glenis Ni is a 54 year old female who presents to the emergency department today with chest pressure that has been stuttering on and off for the last week, worse today. Her pain nitroglycerin sensitive, and she has a history of an MI. Despite a negative troponin, therefore, I will be admitted to observation. Her last stress test was 2.5 years ago, and I do think that she would benefit from provocative testing here. If she had no pain here in the emergency department, and was not nitroglycerin responsive, and a more reassuring history was present she may be an outpatient candidate. However, I do feel she should stay and patient is aware of this. Spoke with Dr. Aldrich who generously agreed to accept care of the patient.     Discharge Diagnosis:    ICD-10-CM    1. Chest pain, unspecified type R07.9        Disposition:  Admitted under the care of Dr. Aldrich to a observation bed.       Scribe Disclosure:  Charlene LEI, am serving as a scribe at 9:33 AM on 10/21/2019 to  document services personally performed by Handy Muñoz MD based on my observations and the provider's statements to me.     10/21/2019    EMERGENCY DEPARTMENT       Handy Muñoz MD  10/21/19 4898

## 2019-10-21 NOTE — PROGRESS NOTES
RECEIVING UNIT ED HANDOFF REVIEW    ED Nurse Handoff Report was reviewed by: Cris Tucker RN on October 21, 2019 at 11:53 AM

## 2019-10-21 NOTE — H&P
[facility]    History and Physical - Hospitalist Service       Date of Admission:  10/21/2019    Assessment & Plan   Glenis Ni is a 54 year old female with a past medical history coronary artery disease, anterior MI s/p stent to LAD in 2011, hyperlipidemia, renal artery stenosis bilateral artery angioplasty, asthma and hypertension who was admitted on 10/21/2019 for chest pain. She reports a 1 to 2-week history of intermittent left-sided chest pain with radiation to her left shoulder and neck associated fatigue with decreased exercise tolerance and nausea for 3 to 4 days.    Unstable Angina  -Coronary artery disease status post JUDY to LAD in 2011, Anterior MI  -Exercise stress echocardiogram 2013, normal  Patient reports a 1 to 2-week history of intermittent chest pain with radiation to her left shoulder and neck that occurs both at rest and with exertion.  Patient reports taking nitro and for the last 3 days. She states that she is unsure if the nitroglycerin improves her pain because she typically takes it before bed.  She reported at least a 2-week history of decreased exercise tolerance, increased fatigue, and intermittent nausea.  Troponin negative.  No acute changes on EKG.  -Serial troponins  -Echocardiogram  -Telemetry monitoring  -Heparin drip  -Cardiology consult  -Aspirin 81 mg p.o. daily  -Metoprolol XL-25mg p.o. daily, hold for SBP less than 100 or heart rate less than 60  -Crestor 40 mg p.o. daily  -Nitroglycerin 0.4 mg p.o. sublingual every 5 minutes as needed for chest pain    Hypertension, history  Patient reports that for the last 2 months she has been taking her antihypertensive medications only every other day due to a recent weight loss of ~4lbs.  -Continue PTA Amlodipine 10mg PO daily, Hold for SBP <100  -Continue PTA losartan 100 mg p.o. daily, hold for SBP less than 100    Renal Artery Stenosis, s/p bilateral renal artery balloon angioplasty   Hyperlipidemia,  history  Last renal ultrasound was completed in October 2018 with recommendation for follow up in one year and to increase Crestor to 40 mg daily.  Patient reports that she has been taking her medication every other day.    Asthma, stable  Patient reports that her asthma symptoms have been and she has not needed to use her inhaler recently.  -Hold PTA Albuterol inhaler 1-2 puffs PRN    GERD  She states this is been well controlled.  -Hold PTA Omeprazole 20mg PO daily       Diet: Combination diet, low saturated Fat, Na <2400mg, No caffeine  DVT Prophylaxis: Low Risk/Ambulatory with no VTE prophylaxis indicated  [place] Catheter: not present  Code Status: Full Code    Disposition Plan   Expected discharge: Tomorrow, recommended to prior living arrangement once evaluation and cleared by cardiology.  Entered: JOHNYN Lawson CNP 10/21/2019, 11:30 AM     The patient's care was discussed with the Attending Physician, Dr. Aldrich.    JOHNNY Lawson CNP  [facility]    ______________________________________________________________________    Chief Complaint   Chest pain    History is obtained from the patient    History of Present Illness   Glenis Ni is a 54 year old female with a past medical history coronary artery disease, anterior MI s/p stent to LAD in 2011, hyperlipidemia, renal artery stenosis bilateral artery angioplasty, asthma and hypertension who was admitted on 10/21/2019 for chest pain.   She reports a history of 1 to 2 weeks of chest heaviness with left-sided chest pain radiating into the left shoulder and neck. She reports taking nitroglycerin every night for days prior to presenting to the ED. She is unsure if the nitroglycerin improves her symptoms because she normally gets a headache from it so she goes to bed.   She reports that since the beginning of October she has not been able to go to the gym due to decreased activity tolerance with associated chest pain. She notes  "that she has been walking at the mall in [place] and she is only able to make approximately 1 lap before needing to sit down due to fatigue. She notes that recently she has been having severe intermittent diaphoresis which she states feels like a \"severe hot flash\", she notes that this is abnormal for her. She endorses nausea for 3 to 4 days that improves slightly with ginger ale. She reported that her symptoms are present both at rest and with activity and is unable to discern if anything makes her symptoms better or worse.   She also notes that approximately 2 months ago she started taking her medications only every other day vs daily as prescribed. She states that she did this because she was told if she lost weight she might not require her blood pressure medications. She reportedly lost 4 pounds so she decided to wean herself back on her medications. She has a home health nurse that does her medications set up and checks her blood pressure every Monday.  She reports per the nurse that her blood pressures have been stable. She recently traveled to [place] at the end of September and states that she was in her normal state of health at that time without any of the current symptomology. She denies any ill contacts, fevers, chills, cough, vomiting, diarrhea, or new swelling.    Review of Systems    The 10 point Review of Systems is negative other than noted in the HPI or here.     Past Medical History    I have reviewed this patient's medical history and updated it with pertinent information if needed.   Past Medical History:   Diagnosis Date     Asthma      Coronary artery disease     cardiac cath 2011: JUDY to LAD and 1st diagonal     Fibromuscular dysplasia (H)      GERD (gastroesophageal reflux disease)      Hyperlipidemia LDL goal <70      Hypertension      MI (myocardial infarction) (H) 8/11     Peripheral vascular disease (H) 7/12    angioplasty bilat renal arteries     Renal artery stenosis (H)  "    Bilateral moderate renal stenosis noted on 12/20/13 renal ultrasound     Sleep apnea     mild per sleep study [facility] 7-10-15       Past Surgical History   I have reviewed this patient's surgical history and updated it with pertinent information if needed.  Past Surgical History:   Procedure Laterality Date     COLONOSCOPY N/A 1/13/2015    Procedure: COLONOSCOPY;  Surgeon: Juan Miguel Garcia MD;  Location:  GI     GYN SURGERY       HYSTERECTOMY      partial       Social History   I have reviewed this patient's social history and updated it with pertinent information if needed.  Social History     Tobacco Use     Smoking status: Former Smoker     Smokeless tobacco: Former User     Quit date: 11/14/1995   Substance Use Topics     Alcohol use: Yes     Comment: wine on rare occasions      Drug use: No       Family History   I have reviewed this patient's family history and updated it with pertinent information if needed.   Mother: Still living, Heart Disease, Unknown type  Father: Unknown medical history    Prior to Admission Medications   Prior to Admission Medications   Prescriptions Last Dose Informant Patient Reported? Taking?   OMEPRAZOLE PO   Yes No   Sig: Take 20 mg by mouth daily   albuterol (PROAIR HFA/PROVENTIL HFA/VENTOLIN HFA) 108 (90 Base) MCG/ACT inhaler prn  Yes Yes   Sig: Inhale 1-2 puffs into the lungs every 4 hours as needed for shortness of breath / dyspnea or wheezing   amLODIPine (NORVASC) 10 MG tablet   No Yes   Sig: Take 1 tablet (10 mg) by mouth daily   aspirin 81 MG EC tablet unknown  Yes Yes   Sig: Take 81 mg by mouth daily   losartan (COZAAR) 100 MG tablet   No Yes   Sig: Take 1 tablet (100 mg) by mouth daily   metoprolol (TOPROL-XL) 25 MG 24 hr tablet  Self Yes No   Sig: Take 25 mg by mouth daily.   nitroGLYcerin (NITROSTAT) 0.4 MG sublingual tablet prn  No Yes   Sig: Place 1 tablet (0.4 mg) under the tongue every 5 minutes as needed for chest pain   rosuvastatin (CRESTOR) 40  MG tablet   No No   Sig: Take 1 tablet (40 mg) by mouth daily      Facility-Administered Medications: None     Allergies   Allergies   Allergen Reactions     Imdur [Isosorbide] Other (See Comments)     Headache.  Patient took 30mg dose for 2 days and had HA on consecutive days.     Morphine Sulfate Nausea and Vomiting       Physical Exam   Vital Signs: Temp: 98  F (36.7  C) Temp src: Temporal BP: (!) 131/92 Pulse: 65 Heart Rate: 60 Resp: 28 SpO2: 97 % O2 Device: None (Room air)    Weight: 0 lbs 0 oz  Constitutional: Awake, alert, cooperative, no apparent distress.  Eyes: Conjunctiva and pupils examined and normal.  HEENT: Moist mucous membranes, normal dentition.  Respiratory: Clear to auscultation bilaterally, no crackles or wheezing.  Cardiovascular: Regular rate and rhythm, normal S1 and S2, and no murmur noted.  GI: Soft, non-distended, non-tender, normal bowel sounds.  Lymph/Hematologic: No anterior cervical or supraclavicular adenopathy.  Skin: No rashes, no cyanosis, no edema.  Musculoskeletal: No joint swelling, erythema or tenderness.  Neurologic: Cranial nerves 2-12 intact, normal strength and sensation.  Psychiatric: Alert, oriented to person, place and time, no obvious anxiety or depression.      Data   Data reviewed today: I reviewed all medications, new labs and imaging results over the last 24 hours. I personally reviewed the EKG tracing showing right bundle branch block, unchanged from prior.      Most Recent 3 CBC's:  Recent Labs   Lab Test 10/21/19  0943 04/03/17  1002 06/24/14  0715   WBC 7.4 7.5 6.7   HGB 13.6 14.1 12.5   MCV 83 85 83    315 232     Most Recent 3 BMP's:  Recent Labs   Lab Test 10/21/19  0943 04/18/17  0749 04/03/17  1002    139 140   POTASSIUM 3.5 3.4* 3.6   CHLORIDE 108 104 105   CO2 27 30* 27   BUN 10 10 7   CR 0.82 0.92 0.71   ANIONGAP 6 8.4 8   ROSANNA 9.5 9.3 9.2   GLC 97 89 91     Most Recent 3 Troponin's:  Recent Labs   Lab Test 10/21/19  0943 04/03/17  1002  01/02/14  1924 01/02/14  1830   TROPI <0.015 <0.015  The 99th percentile for upper reference range is 0.045 ug/L.  Troponin values in   the range of 0.045 - 0.120 ug/L may be associated with risks of adverse   clinical events.    --  Testing performed on ISTAT  Test not available at time of request   TROPONIN  --   --  0.00  --

## 2019-10-22 VITALS
WEIGHT: 209.4 LBS | SYSTOLIC BLOOD PRESSURE: 146 MMHG | HEIGHT: 64 IN | BODY MASS INDEX: 35.75 KG/M2 | OXYGEN SATURATION: 99 % | TEMPERATURE: 98.1 F | DIASTOLIC BLOOD PRESSURE: 68 MMHG | RESPIRATION RATE: 16 BRPM | HEART RATE: 59 BPM

## 2019-10-22 PROBLEM — I25.110 CORONARY ARTERY DISEASE INVOLVING NATIVE CORONARY ARTERY OF NATIVE HEART WITH UNSTABLE ANGINA PECTORIS (H): Status: ACTIVE | Noted: 2019-10-21

## 2019-10-22 PROBLEM — I20.0 UNSTABLE ANGINA PECTORIS (H): Status: ACTIVE | Noted: 2019-10-21

## 2019-10-22 LAB
LMWH PPP CHRO-ACNC: 0.15 IU/ML
POTASSIUM SERPL-SCNC: 3.6 MMOL/L (ref 3.4–5.3)

## 2019-10-22 PROCEDURE — 25800030 ZZH RX IP 258 OP 636: Performed by: INTERNAL MEDICINE

## 2019-10-22 PROCEDURE — 25000132 ZZH RX MED GY IP 250 OP 250 PS 637: Performed by: INTERNAL MEDICINE

## 2019-10-22 PROCEDURE — 84132 ASSAY OF SERUM POTASSIUM: CPT | Performed by: INTERNAL MEDICINE

## 2019-10-22 PROCEDURE — 85520 HEPARIN ASSAY: CPT | Performed by: NURSE PRACTITIONER

## 2019-10-22 PROCEDURE — 99239 HOSP IP/OBS DSCHRG MGMT >30: CPT | Performed by: INTERNAL MEDICINE

## 2019-10-22 PROCEDURE — 36415 COLL VENOUS BLD VENIPUNCTURE: CPT | Performed by: INTERNAL MEDICINE

## 2019-10-22 PROCEDURE — 36415 COLL VENOUS BLD VENIPUNCTURE: CPT | Performed by: NURSE PRACTITIONER

## 2019-10-22 PROCEDURE — 99232 SBSQ HOSP IP/OBS MODERATE 35: CPT | Performed by: INTERNAL MEDICINE

## 2019-10-22 PROCEDURE — 25000132 ZZH RX MED GY IP 250 OP 250 PS 637: Performed by: NURSE PRACTITIONER

## 2019-10-22 RX ADMIN — ACETAMINOPHEN 650 MG: 325 TABLET, FILM COATED ORAL at 08:56

## 2019-10-22 RX ADMIN — LOSARTAN POTASSIUM 100 MG: 100 TABLET, FILM COATED ORAL at 08:55

## 2019-10-22 RX ADMIN — AMLODIPINE BESYLATE 10 MG: 10 TABLET ORAL at 08:55

## 2019-10-22 RX ADMIN — ASPIRIN 81 MG: 81 TABLET, DELAYED RELEASE ORAL at 08:55

## 2019-10-22 RX ADMIN — METOPROLOL SUCCINATE 25 MG: 25 TABLET, EXTENDED RELEASE ORAL at 08:55

## 2019-10-22 RX ADMIN — ROSUVASTATIN CALCIUM 40 MG: 20 TABLET, FILM COATED ORAL at 08:56

## 2019-10-22 RX ADMIN — POTASSIUM CHLORIDE 20 MEQ: 1500 TABLET, EXTENDED RELEASE ORAL at 08:55

## 2019-10-22 RX ADMIN — SODIUM CHLORIDE 150 ML/HR: 9 INJECTION, SOLUTION INTRAVENOUS at 08:55

## 2019-10-22 ASSESSMENT — ACTIVITIES OF DAILY LIVING (ADL)
ADLS_ACUITY_SCORE: 10

## 2019-10-22 ASSESSMENT — MIFFLIN-ST. JEOR: SCORE: 1529.83

## 2019-10-22 NOTE — DISCHARGE SUMMARY
[facility]  Hospitalist Discharge Summary       Date of Admission:  10/21/2019  Date of Discharge:  10/22/2019  Discharging Provider: Carl Aldrich MD      Discharge Diagnoses   Coronary artery disease involving native coronary artery of native heart with unstable angina pectoris (H)  Hypertension    Follow-ups Needed After Discharge   Follow-up Appointments     Follow-up and recommended labs and tests       Follow up with cardiology in clinic next Tuesday as they arrange for you.             Discharge Disposition   Discharged to home  Condition at discharge: Stable    Hospital Course   Glenis Ni is a 54 year old female with a past medical history coronary artery disease with anterior MI s/p stent to LAD in 2011, hyperlipidemia, renal artery stenosis status post bilateral artery angioplasty, asthma and hypertension who was admitted on 10/21/2019 for unstable angina. She reported a 1 to 2-week history of intermittent left-sided chest pain with radiation to her left shoulder and neck associated fatigue with decreased exercise tolerance and nausea for 3 to 4 days similar to past MI.    Unstable Angina  -Coronary artery disease status post JUDY to LAD in 2011, Anterior MI  -Exercise stress echocardiogram 2013, normal  Patient reports a 1 to 2-week history of intermittent chest pain with radiation to her left shoulder and neck that occurs both at rest and with exertion.  Patient reports taking nitro for the last 3 days. She states that she is unsure if the nitroglycerin improves her pain because she typically takes it before bed.  She reported at least a 2-week history of decreased exercise tolerance, increased fatigue, and intermittent nausea.  Troponin negative.  No acute changes on EKG or ECHO but symptoms suggestive of unstable angina. Treated with heparin drip and cardiology planning angiogram but after discussion of risks of procedure patient decided she did not want angiogram and wanted  to go home and follow up in clinic. She was offered imdur but also refused that. I discussed this with her again and she now understands that the procedure would not require her chest to be opened unless something went wrong but still wants to discharge and follow up in clinic.  -Cardiology consult appreciated and they are okay with patient going home with instructions for her to return immediately if chest pain not controlled with nitroglycerin  -continue PTA Aspirin, Metoprolol XL, Crestor, and Nitroglycerin 0.4 mg p.o. sublingual every 5 minutes as needed for chest pain  -return with any chest pain not relieved by nitroglycerin  -follow up with cardiology in clinic on Tuesday    Hypertension, history  Patient reports that for the last 2 months she has been taking her antihypertensive medications only every other day due to a recent weight loss of ~4lbs.  -Continue PTA Amlodipine 10mg PO daily, Hold for SBP <100  -Continue PTA losartan 100 mg p.o. daily, hold for SBP less than 100  -instructed to take these as prescribed    Renal Artery Stenosis, s/p bilateral renal artery balloon angioplasty   Hyperlipidemia, history  Last renal ultrasound was completed in October 2018 with recommendation for follow up in one year and to increase Crestor to 40 mg daily.  Patient reports that she has been taking her medication every other day.  -should take medication as prescribed and follow up for repeat ultrasound    Asthma, stable  Patient reports that her asthma symptoms have been inactive and she has not needed to use her inhaler recently.  -Continue PTA Albuterol inhaler 1-2 puffs PRN at discharge    GERD  She states this is been well controlled.  -Continue PTA Omeprazole 20mg PO daily    Consultations This Hospital Stay   SOCIAL WORK IP CONSULT  CARDIOLOGY IP CONSULT  PHARMACY TO DOSE HEPARIN    Code Status   Full Code    Time Spent on this Encounter   I, Carl Aldrich MD, personally saw the patient today and spent  greater than 30 minutes discharging this patient.       Carl Aldrich MD  [facility]  ______________________________________________________________________    Physical Exam   Vital Signs: Temp: 98.1  F (36.7  C) Temp src: Oral BP: (!) 146/68 Pulse: 59 Heart Rate: 64 Resp: 16 SpO2: 99 % O2 Device: None (Room air)    Weight: 209 lbs 6.4 oz  Constitutional: Awake, alert, cooperative, no apparent distress  Respiratory: Clear to auscultation bilaterally, no crackles or wheezing  Cardiovascular: Regular rate and rhythm, normal S1 and S2, and no murmur noted  GI: Normal bowel sounds, soft, non-distended, non-tender  Skin/Integumen: No rashes, no cyanosis, no edema  Other:       Primary Care Physician   [facility]    Discharge Orders      Discharge Order: F/U with Cardiac  KAYLEE      Reason for your hospital stay    You were admitted with chest pain.  Echocardiogram pictures of your heart and troponin tests that are enzymes released by heart cells when they are damaged were both done and were negative for any structural damage or heart attack.  We think your chest pain may still be from the arteries in your heart but you elected to wait to have further studies as an outpatient next week in clinic.  We discussed adding a medicine called Imdur to your regimen but you elected to just keep using your sublingual nitroglycerin as needed.  If you have any recurrence of your chest pain not relieved by nitroglycerin you should seek immediate medical attention.  Otherwise you can follow-up with cardiology in the clinic on Tuesday. It was a pleasure for our Hospitalist group and me personally to care for you at [facility].  We wish you a speedy recovery and good health!     Activity    Your activity upon discharge: activity as tolerated     Follow-up and recommended labs and tests     Follow up with cardiology in clinic next Tuesday as they arrange for you.     Full Code      Diet    Follow this diet upon discharge: Low Saturated Fat Na <2400mg Diet heart healthy diet       Significant Results and Procedures   Most Recent 3 CBC's:  Recent Labs   Lab Test 10/21/19  0943 04/03/17  1002 06/24/14  0715   WBC 7.4 7.5 6.7   HGB 13.6 14.1 12.5   MCV 83 85 83    315 232     Most Recent 3 BMP's:  Recent Labs   Lab Test 10/22/19  0748 10/21/19  0943 04/18/17  0749 04/03/17  1002   NA  --  141 139 140   POTASSIUM 3.6 3.5 3.4* 3.6   CHLORIDE  --  108 104 105   CO2  --  27 30* 27   BUN  --  10 10 7   CR  --  0.82 0.92 0.71   ANIONGAP  --  6 8.4 8   ROSANNA  --  9.5 9.3 9.2   GLC  --  97 89 91     Most Recent 3 Troponin's:  Recent Labs   Lab Test 10/21/19  1421 10/21/19  0943 04/03/17  1002 01/02/14  1924   TROPI <0.015 <0.015 <0.015  The 99th percentile for upper reference range is 0.045 ug/L.  Troponin values in   the range of 0.045 - 0.120 ug/L may be associated with risks of adverse   clinical events.    --    TROPONIN  --   --   --  0.00   ,   Results for orders placed or performed during the hospital encounter of 10/21/19   XR Chest 2 Views    Narrative    CHEST TWO VIEWS  10/21/2019 10:02 AM     HISTORY:  Chest pain.    COMPARISON: 4/3/2017.      Impression    IMPRESSION: Negative chest. Lungs clear.    KOLE WILLIS MD     Echo 10/21/2019  Interpretation Summary: The visual ejection fraction is estimated at 60-65%. Normal left ventricular wall motion.    Discharge Medications   Current Discharge Medication List      CONTINUE these medications which have NOT CHANGED    Details   albuterol (PROAIR HFA/PROVENTIL HFA/VENTOLIN HFA) 108 (90 Base) MCG/ACT inhaler Inhale 1-2 puffs into the lungs every 4 hours as needed for shortness of breath / dyspnea or wheezing    Comments: Pharmacy may dispense brand covered by insurance (Proair, or proventil or ventolin or generic albuterol inhaler)      amLODIPine (NORVASC) 10 MG tablet Take 1 tablet (10 mg) by mouth daily  Qty: 90 tablet, Refills: 1     Associated Diagnoses: Benign essential hypertension      aspirin 81 MG EC tablet Take 81 mg by mouth daily      losartan (COZAAR) 100 MG tablet Take 1 tablet (100 mg) by mouth daily  Qty: 90 tablet, Refills: 3    Associated Diagnoses: Benign essential hypertension      metoprolol (TOPROL-XL) 25 MG 24 hr tablet Take 25 mg by mouth daily.      nitroGLYcerin (NITROSTAT) 0.4 MG sublingual tablet Place 1 tablet (0.4 mg) under the tongue every 5 minutes as needed for chest pain  Qty: 25 tablet, Refills: 1    Associated Diagnoses: Coronary artery disease involving native coronary artery of native heart without angina pectoris      omeprazole (PRILOSEC) 20 MG DR capsule Take 20 mg by mouth daily      rosuvastatin (CRESTOR) 40 MG tablet Take 1 tablet (40 mg) by mouth daily  Qty: 90 tablet, Refills: 3    Associated Diagnoses: Coronary artery disease involving native coronary artery of native heart without angina pectoris           Allergies   Allergies   Allergen Reactions     Imdur [Isosorbide] Other (See Comments)     Headache.  Patient took 30mg dose for 2 days and had HA on consecutive days.     Morphine Sulfate Nausea and Vomiting

## 2019-10-22 NOTE — PROGRESS NOTES
[facility]  Cardiology Progress Note  Date of Service: 10/22/2019  Primary Cardiologist: Dr. Tobias    Assessment & Plan    Glenis Ni is a 54 year old female with past medical history significant for CAD, FMD of renal arteries, HTN, HLP, obesity, PINA, former tobacco user, and mild asthma admitted on 10/21/2019 with left sided chest discomfort for 3 weeks prior to admission radiating to her neck and back both at rest and on exertion.      Assessment:   1. Unstable angina/CAD:    Status post pLAD stent (3 x 25 mm JUDY) and angioplasty of the D1 in 2011.     Repeat angiogram in 2014 showed patent LAD stent and no additional disease    Troponin negative    LVEF 60-65% with normal wall motion    2. Fibromuscular dysplasia:     S/p bilateral renal artery angioplasty    3. Hypertension    [PTA: losartan 100 mg, metoprolol XL 25 mg daily, amlodipine 10 mg daily]    4. Dyslipidemia    PTA: Rosuvastatin 40 mg daily    Total cholesterol 125, HDL 34, LDL 66, triglycerides 124      Plan:  1. Coronary angiogram today    JOHNNY ZAPATA CNP  Pager:  [phone]   (7am - 5pm, M-F)    Interval History   Pain free. Discussed angiogram and all questions answered    Physical Exam   Temp: 98.1  F (36.7  C) Temp src: Oral BP: (!) 146/68 Pulse: 59 Heart Rate: 64 Resp: 16 SpO2: 99 % O2 Device: None (Room air)    Vitals:    10/22/19 0549   Weight: 95 kg (209 lb 6.4 oz)       Constitutional:   NAD   Skin:   Warm and dry   Head:   Nontraumatic   Neck:   no JVD   Lungs:   symmetric, clear to auscultation   Cardiovascular:   regular rate and rhythm and normal S1 and S2   Abdomen:   Benign   Extremities and Back:   No edema   Neurological:   Grossly nonfocal       Medications     HEParin 850 Units/hr (10/22/19 5446)     - MEDICATION INSTRUCTIONS -       sodium chloride 75 mL/hr at 10/22/19 1131       amLODIPine  10 mg Oral Daily     aspirin  81 mg Oral Daily     losartan  100 mg Oral Daily     metoprolol succinate ER   25 mg Oral Daily     rosuvastatin  40 mg Oral Daily     sodium chloride (PF)  3 mL Intracatheter Q8H       Data     Most Recent 3 CBC's:  Recent Labs   Lab Test 10/21/19  0943 04/03/17  1002 06/24/14  0715   WBC 7.4 7.5 6.7   HGB 13.6 14.1 12.5   MCV 83 85 83    315 232     Most Recent 3 BMP's:  Recent Labs   Lab Test 10/22/19  0748 10/21/19  0943 04/18/17  0749 04/03/17  1002   NA  --  141 139 140   POTASSIUM 3.6 3.5 3.4* 3.6   CHLORIDE  --  108 104 105   CO2  --  27 30* 27   BUN  --  10 10 7   CR  --  0.82 0.92 0.71   ANIONGAP  --  6 8.4 8   ROSANNA  --  9.5 9.3 9.2   GLC  --  97 89 91

## 2019-10-22 NOTE — PROGRESS NOTES
AVS given to patient and spouse. Questions answered. Belongings with pt. Pt requested to walk with  to vehicle versus wheelchair ride. Denies CP or SOB.

## 2019-10-22 NOTE — PLAN OF CARE
Glenis is alert, oriented, pleasant. She reports chest pressure before bed, rated 8/10. She received nitroglycerin x 1 with improvement. She also received Mylanta. VSS on room air. Tele shows SR. She is NPO overnight for angio today. Heparin infusing without incident. She is up independently.

## 2019-10-22 NOTE — CONSULTS
INPATIENT CARDIOLOGY CONSULTATION.  [facility]  Date of Admission:10/21/2019  Date of Consult: 10/21/2019     REFERRAL SOURCE:  JOHNNY Perdomo, CNP, Hospitalist Service       PRIMARY CARDIOLOGIST:  Dr. Mica Tobias MD, [facility]      REASON FOR REFERRAL:  Unstable angina in a patient with known CAD.      HISTORY OF PRESENT ILLNESS:    Glenis Ni is a 54-year-old very pleasant  lady who follows with Dr. Tobias for her cardiology care.  She last saw him approximately a year ago.      Her medical history is significant for:   1.  Coronary artery disease, status post proximal LAD stent (3 x 25 mm everolimus-eluting stent) and angioplasty of the first diagonal branch in 2011.  She had repeat coronary angiogram in 06/2014, which showed the coronaries were widely patent and there was no significant restenosis of the LAD stent.   2.  Fibromuscular dysplasia of the renal arteries, status post renal artery angioplasty in the past.   3.  Essential hypertension.   4.  Dyslipidemia.  Total cholesterol 125, HDL 34, LDL 66, triglycerides 124, on low-dose simvastatin 40 mg daily.   5.  Obesity.   6.  Mild obstructive sleep apnea per sleep study in 2015.   7.  Mild asthma.   8.  Former tobacco user who quit in 1990.      Glenis presents with a 3-week history of left-sided chest discomfort radiating to the neck and back, can occur at rest or with exertion, occasionally resolved with sublingual nitroglycerin.  Her serial troponins are negative.  ECG shows chronic right bundle branch block without any acute ischemic changes.  Echocardiogram done today shows normal wall motion with LVEF of 60-65%, normal RV size and systolic function and no significant valve disease.  Chest x-ray is unremarkable. The rest of her labs are also satisfactory with a creatinine of 0.82 and hemoglobin of 13.6, platelets 304.      PHYSICAL EXAMINATION:   GENERAL:  Pertinent for elevated BMI, comfortably  seated in a chair.   HEART:  Regular sounds, no audible murmur or pericardial friction rub.   LUNGS:  Clear.     NEUROLOGIC:  Alert and oriented.      ASSESSMENT AND PLAN:   1.  Unstable angina:  It is possible that she has recurrent angina.  She states that her symptoms prior to the LAD stenting in  was more exertional dyspnea and chest discomfort; however, with her risk factor profile, coronary angiogram is indicated.  Additionally, due to her fibromuscular dysplasia, she is at risk for spontaneous coronary artery dissection.  For now, she is chest pain-free.  Continue IV heparin.   2.  Hypertension:  Continue established home medications of losartan 100 mg daily, metoprolol XL 25 mg daily.   3.  Dyslipidemia:  Continue simvastatin 40 mg daily.   4.  Fibrodysplasia, status post bilateral renal artery angioplasty.      Cardiology will follow.  Thank you for consulting us.         VERO HERNANDEZ MD             D: 10/21/2019   T: 10/21/2019   MT: KAUR      Name:     JOCELYN GORDILLO   MRN:      [id number]        Account:       [id number]   :      1965           Consult Date:  10/21/2019      Document: [id number]

## 2019-10-22 NOTE — PLAN OF CARE
VSS. Monitor remains Sinus rhythm. Pt. Denies chest pain. Spouse at bedside. Plan for discharge today.

## 2019-10-22 NOTE — PROGRESS NOTES
Transfer from obs at 1845. VSS. RA. IVF heparin running. Hep 10a scheduled for lab collect. Pt denies cp, or sob. Up independently in room. Plan for Angio in am. NPO at midnight pt aware.

## 2019-10-23 ENCOUNTER — TELEPHONE (OUTPATIENT)
Dept: CARDIOLOGY | Facility: CLINIC | Age: 54
End: 2019-10-23

## 2019-10-23 NOTE — TELEPHONE ENCOUNTER
Patient was evaluated by cardiology while inpatient for int chest pain x 2 weeks, that would radiate to left arm and associated with nausea and decrease activity tolerance. Dr. Adair recommend coronary angiogram-pt initially agreeable, but then changed her mind and declined coronary angiogram and noninvasive testing as well. Requests to have completed on OP basis. Dr. Adair recommended pt be discharged to home on Imdur, but pt refused this as well. Called patient to discuss any post hospital d/c questions she may have, review medication changes, and confirm f/u appts. Patient denied any questions regarding new medications or changes to PTA medications. Patient denied any SOB, chest pain, or light headedness. RN confirmed with patient that she has an apt scheduled on 10/29/19 with MERCEDES Marino Ford. Patient advised to call clinic with any cardiac related questions or concerns prior to this mercedes't. Patient verbalized understanding and agreed with plan.  CRYSTAL Perez RN.
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
## 2019-10-29 ENCOUNTER — OFFICE VISIT (OUTPATIENT)
Dept: CARDIOLOGY | Facility: CLINIC | Age: 54
End: 2019-10-29
Attending: INTERNAL MEDICINE
Payer: COMMERCIAL

## 2019-10-29 ENCOUNTER — TELEPHONE (OUTPATIENT)
Dept: CARDIOLOGY | Facility: CLINIC | Age: 54
End: 2019-10-29

## 2019-10-29 ENCOUNTER — DOCUMENTATION ONLY (OUTPATIENT)
Dept: OTHER | Facility: CLINIC | Age: 54
End: 2019-10-29

## 2019-10-29 VITALS
SYSTOLIC BLOOD PRESSURE: 139 MMHG | HEART RATE: 66 BPM | BODY MASS INDEX: 36.07 KG/M2 | WEIGHT: 211.3 LBS | DIASTOLIC BLOOD PRESSURE: 84 MMHG | HEIGHT: 64 IN

## 2019-10-29 DIAGNOSIS — I25.10 CORONARY ARTERY DISEASE INVOLVING NATIVE CORONARY ARTERY OF NATIVE HEART WITHOUT ANGINA PECTORIS: Primary | ICD-10-CM

## 2019-10-29 DIAGNOSIS — I77.3 FIBROMUSCULAR DYSPLASIA (H): ICD-10-CM

## 2019-10-29 DIAGNOSIS — I70.1 RENAL ARTERY STENOSIS (H): ICD-10-CM

## 2019-10-29 DIAGNOSIS — I10 BENIGN ESSENTIAL HYPERTENSION: ICD-10-CM

## 2019-10-29 DIAGNOSIS — E78.2 MIXED HYPERLIPIDEMIA: ICD-10-CM

## 2019-10-29 DIAGNOSIS — E78.2 MIXED HYPERLIPIDEMIA: Primary | ICD-10-CM

## 2019-10-29 DIAGNOSIS — I25.110 CORONARY ARTERY DISEASE INVOLVING NATIVE CORONARY ARTERY OF NATIVE HEART WITH UNSTABLE ANGINA PECTORIS (H): ICD-10-CM

## 2019-10-29 PROCEDURE — 99214 OFFICE O/P EST MOD 30 MIN: CPT | Performed by: NURSE PRACTITIONER

## 2019-10-29 RX ORDER — NITROGLYCERIN 0.4 MG/1
0.4 TABLET SUBLINGUAL EVERY 5 MIN PRN
Qty: 25 TABLET | Refills: 1 | Status: SHIPPED | OUTPATIENT
Start: 2019-10-29 | End: 2020-11-25

## 2019-10-29 ASSESSMENT — MIFFLIN-ST. JEOR: SCORE: 1538.45
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
## 2019-10-29 NOTE — PATIENT INSTRUCTIONS
Thanks for coming into the [facility] today.    Today's plan:   1. We'll plan to get you scheduled for an exercise stress test.   2. Please do not take your metoprolol dose on the morning of the stress test.  3. We'll schedule you for a follow up visit after the stress test to discuss the results.  4. Follow up as planned with Dr. Tobias at 2:15 PM on 1/7/2020 with fasting labs and a renal ultrasound at 9:40 AM on 12/30/19.     If you have questions or concerns, please call my nurse at [phone].    Scheduling phone number: [phone]    Reminder: Please bring in all current medications, any over the counter supplements, and any vitamin bottles to your next appointment.    It was a pleasure seeing you today!     JOHNNY Luna, CNP   10/29/19
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
## 2019-10-30 ENCOUNTER — HOSPITAL ENCOUNTER (OUTPATIENT)
Dept: CARDIOLOGY | Facility: CLINIC | Age: 54
Discharge: HOME | End: 2019-10-30
Attending: NURSE PRACTITIONER | Admitting: NURSE PRACTITIONER
Payer: COMMERCIAL

## 2019-10-30 DIAGNOSIS — I25.10 CORONARY ARTERY DISEASE INVOLVING NATIVE CORONARY ARTERY OF NATIVE HEART WITHOUT ANGINA PECTORIS: ICD-10-CM

## 2019-10-30 PROCEDURE — 93016 CV STRESS TEST SUPVJ ONLY: CPT | Performed by: INTERNAL MEDICINE

## 2019-10-30 PROCEDURE — 93018 CV STRESS TEST I&R ONLY: CPT | Performed by: INTERNAL MEDICINE

## 2019-10-30 PROCEDURE — A9502 TC99M TETROFOSMIN: HCPCS | Performed by: NURSE PRACTITIONER

## 2019-10-30 PROCEDURE — 34300033 ZZH RX 343: Performed by: NURSE PRACTITIONER

## 2019-10-30 PROCEDURE — 78452 HT MUSCLE IMAGE SPECT MULT: CPT | Mod: 26 | Performed by: INTERNAL MEDICINE

## 2019-10-30 PROCEDURE — 78452 HT MUSCLE IMAGE SPECT MULT: CPT

## 2019-10-30 RX ADMIN — TETROFOSMIN 5.94 MCI.: 1.38 INJECTION, POWDER, LYOPHILIZED, FOR SOLUTION INTRAVENOUS at 08:52

## 2019-10-30 RX ADMIN — TETROFOSMIN 21 MCI.: 1.38 INJECTION, POWDER, LYOPHILIZED, FOR SOLUTION INTRAVENOUS at 10:09

## 2019-10-31 ENCOUNTER — TELEPHONE (OUTPATIENT)
Dept: CARDIOLOGY | Facility: CLINIC | Age: 54
End: 2019-10-31

## 2019-10-31 NOTE — TELEPHONE ENCOUNTER
----- Message from Sin Ford NP sent at 10/31/2019  7:47 AM CDT -----  Could you please update Glenis that her stress test looked good and did not show any evidence of ischemia. She can cancel the visit next week and follow up with Dr. Tobias as planned this winter.     Thank you!    Marino

## 2019-12-09 ENCOUNTER — TELEPHONE (OUTPATIENT)
Dept: CARDIOLOGY | Facility: CLINIC | Age: 54
End: 2019-12-09

## 2019-12-09 DIAGNOSIS — I70.1 RENAL ARTERY STENOSIS (H): Primary | ICD-10-CM

## 2019-12-09 DIAGNOSIS — I77.3 RENAL FIBROMUSCULAR DYSPLASIA (H): ICD-10-CM

## 2019-12-30 ENCOUNTER — HOSPITAL ENCOUNTER (OUTPATIENT)
Dept: ULTRASOUND IMAGING | Facility: CLINIC | Age: 54
Discharge: HOME | End: 2019-12-30
Attending: INTERNAL MEDICINE | Admitting: INTERNAL MEDICINE
Payer: COMMERCIAL

## 2019-12-30 DIAGNOSIS — I70.1 RENAL ARTERY STENOSIS (H): ICD-10-CM

## 2019-12-30 DIAGNOSIS — E78.2 MIXED HYPERLIPIDEMIA: ICD-10-CM

## 2019-12-30 LAB
ALT SERPL W P-5'-P-CCNC: <5 U/L (ref 5–30)
CHOLEST SERPL-MCNC: 153 MG/DL
HDLC SERPL-MCNC: 43 MG/DL
LDLC SERPL CALC-MCNC: 82 MG/DL
NONHDLC SERPL-MCNC: 110 MG/DL
TRIGL SERPL-MCNC: 139 MG/DL

## 2019-12-30 PROCEDURE — 93975 VASCULAR STUDY: CPT | Mod: 26 | Performed by: INTERNAL MEDICINE

## 2019-12-30 PROCEDURE — 84460 ALANINE AMINO (ALT) (SGPT): CPT | Performed by: INTERNAL MEDICINE

## 2019-12-30 PROCEDURE — 36415 COLL VENOUS BLD VENIPUNCTURE: CPT | Performed by: INTERNAL MEDICINE

## 2019-12-30 PROCEDURE — 80061 LIPID PANEL: CPT | Performed by: INTERNAL MEDICINE

## 2019-12-30 PROCEDURE — 76770 US EXAM ABDO BACK WALL COMP: CPT | Mod: 26 | Performed by: INTERNAL MEDICINE

## 2019-12-30 PROCEDURE — 93975 VASCULAR STUDY: CPT | Mod: TC

## 2020-01-07 ENCOUNTER — DOCUMENTATION ONLY (OUTPATIENT)
Dept: CARDIOLOGY | Facility: CLINIC | Age: 55
End: 2020-01-07

## 2020-01-07 ENCOUNTER — OFFICE VISIT (OUTPATIENT)
Dept: CARDIOLOGY | Facility: CLINIC | Age: 55
End: 2020-01-07
Payer: COMMERCIAL

## 2020-01-07 VITALS
WEIGHT: 216 LBS | DIASTOLIC BLOOD PRESSURE: 88 MMHG | BODY MASS INDEX: 36.88 KG/M2 | HEART RATE: 76 BPM | SYSTOLIC BLOOD PRESSURE: 138 MMHG | HEIGHT: 64 IN

## 2020-01-07 DIAGNOSIS — I25.10 CORONARY ARTERY DISEASE INVOLVING NATIVE CORONARY ARTERY OF NATIVE HEART WITHOUT ANGINA PECTORIS: Primary | ICD-10-CM

## 2020-01-07 PROCEDURE — 99213 OFFICE O/P EST LOW 20 MIN: CPT | Performed by: INTERNAL MEDICINE

## 2020-01-07 ASSESSMENT — MIFFLIN-ST. JEOR: SCORE: 1559.77

## 2020-01-07 NOTE — PROGRESS NOTES
Message from Dr. Tobias:  Mica Tobias MD Theis, Marcie J, RN; Doris Guadalupe, RN; Ksenia Appiah RN   Cc: Mike Taveras MD Abdi, could you look at this patient's recent renal ultrasound? BP appears relatively well controlled so I'm not sure if anything needs to be done. Thanks       Will monitor for reply from Dr. Taveras    1/20/2020 update from Dr. Tobias - he spoke with Dr. Taveras:  He didn't think we needed to pursue intervention unless her blood pressure control worsened. Thanks

## 2020-01-07 NOTE — LETTER
1/7/2020    Charisse Stasdesmondasgar   [facility] [address] [place] [address]    RE: Glenis Ni       Dear Colleague,    I had the pleasure of seeing Glenis Ni in the [facility].    HPI and Plan:     I had the pleasure of seeing Ms. Ni in followup at the [facility] today.  She is a very pleasant 54-year-old female who I last saw in 09/2018.  She has a history of coronary artery disease and is status post angioplasty and stenting of a 95% LAD lesion in 08/2011.  She also has a history of fibromuscular dysplasia of the renal arteries and has undergone PTCA in the past.      Her last coronary angiogram was in 06/2014 at which point no significant obstructive disease was found.  She has also undergone a workup for thoracic outlet syndrome which has been unrevealing.     She presents today after recent hospitalization at [facility] in August of this year.  She presented with a 1 to 2-week history of intermittent left-sided chest discomfort although cardiac troponins were negative.  Given these symptoms, she was seen by cardiology as an inpatient and coronary angiography was recommended.  Ultimately she declined to proceed with this or with any noninvasive testing at the time.  Subsequently, however, she underwent an exercise nuclear stress test on 10/30/2019 as an outpatient which was negative for ischemia.  Left ventricular systolic function was normal with an LVEF of 59%.     She presents today feeling well overall from a cardiovascular standpoint.  She specifically denies any chest discomfort since her hospitalization. She denies any dyspnea on exertion, PND, orthopnea or lower extremity edema.  She denies any syncope or presyncope.  She is actually been working out at the gym on a regular basis without any limitations.  She is not any take any nitroglycerin.         Her physical exam is as dictated below.         Lipids on 12/30/2019 demonstrated total cholesterol 153, HDL 43, LDL of 82 and triglycerides of 139.     IMPRESSION:   1.  Coronary artery disease, status post drug-eluting stent placement to mid LAD in 2011.  S he was recently admitted to [facility] with chest discomfort with negative troponins and a subsequent negative exercise nuclear stress test.    2.  History of renal fibromuscular dysplasia, status post stenting.   3.  Hypertension.   4.  Dyslipidemia.        The patient is doing well overall from a cardiovascular standpoint.  There is no evidence of angina or congestive heart failure.  Her chest discomfort has fortunately resolved spontaneously and her stress perfusion findings are reassuring.  At this point in time I would recommend continuation of her current medical regimen.  I stressed the need for weight loss given the interval worsening in the lipid panel.  We will reassess this in approximately 3 to 6 months.    She also underwent a recent renal artery ultrasound on 12/30/2019 that demonstrated elevated flow velocities in the right renal artery which has been previously documented.  I will have our vascular colleagues review this.    It was a pleasure seeing her in follow-up.              CURRENT MEDICATIONS:  Current Outpatient Medications   Medication Sig Dispense Refill     albuterol (PROAIR HFA/PROVENTIL HFA/VENTOLIN HFA) 108 (90 Base) MCG/ACT inhaler Inhale 1-2 puffs into the lungs every 4 hours as needed for shortness of breath / dyspnea or wheezing       amLODIPine (NORVASC) 10 MG tablet Take 1 tablet (10 mg) by mouth daily 90 tablet 1     aspirin 81 MG EC tablet Take 81 mg by mouth daily       losartan (COZAAR) 100 MG tablet Take 1 tablet (100 mg) by mouth daily 90 tablet 3     metoprolol (TOPROL-XL) 25 MG 24 hr tablet Take 25 mg by mouth daily.       nitroGLYcerin (NITROSTAT) 0.4 MG sublingual tablet Place 1 tablet (0.4 mg) under the tongue every 5 minutes as needed for chest  pain 25 tablet 1     omeprazole (PRILOSEC) 20 MG DR capsule Take 20 mg by mouth daily       rosuvastatin (CRESTOR) 40 MG tablet Take 1 tablet (40 mg) by mouth daily 90 tablet 3       ALLERGIES     Allergies   Allergen Reactions     Imdur [Isosorbide] Other (See Comments)     Headache.  Patient took 30mg dose for 2 days and had HA on consecutive days.     Morphine Sulfate Nausea and Vomiting       PAST MEDICAL HISTORY:  Past Medical History:   Diagnosis Date     Asthma      Coronary artery disease     cardiac cath 2011: JUDY to LAD and 1st diagonal     Fibromuscular dysplasia (H)      GERD (gastroesophageal reflux disease)      Hyperlipidemia LDL goal <70      Hypertension      MI (myocardial infarction) (H) 8/11     Peripheral vascular disease (H) 7/12    angioplasty bilat renal arteries     Renal artery stenosis (H)     Bilateral moderate renal stenosis noted on 12/20/13 renal ultrasound     Sleep apnea     mild per sleep study [facility] 7-10-15       PAST SURGICAL HISTORY:  Past Surgical History:   Procedure Laterality Date     COLONOSCOPY N/A 1/13/2015    Procedure: COLONOSCOPY;  Surgeon: Juan Miguel Garcia MD;  Location:  GI     GYN SURGERY       HYSTERECTOMY      partial       FAMILY HISTORY:  Family History   Problem Relation Age of Onset     Heart Disease Mother      Heart Disease Sister        SOCIAL HISTORY:  Social History     Socioeconomic History     Marital status: Single     Spouse name: Not on file     Number of children: Not on file     Years of education: Not on file     Highest education level: Not on file   Occupational History     Not on file   Social Needs     Financial resource strain: Not on file     Food insecurity:     Worry: Not on file     Inability: Not on file     Transportation needs:     Medical: Not on file     Non-medical: Not on file   Tobacco Use     Smoking status: Former Smoker     Packs/day: 1.00     Years: 20.00     Pack years: 20.00     Start date: 1970     Last  attempt to quit: 1990     Years since quittin.0     Smokeless tobacco: Former User     Quit date: 1995   Substance and Sexual Activity     Alcohol use: Yes     Comment: wine on rare occasions      Drug use: No     Sexual activity: Not on file   Lifestyle     Physical activity:     Days per week: Not on file     Minutes per session: Not on file     Stress: Not on file   Relationships     Social connections:     Talks on phone: Not on file     Gets together: Not on file     Attends Yarsani service: Not on file     Active member of club or organization: Not on file     Attends meetings of clubs or organizations: Not on file     Relationship status: Not on file     Intimate partner violence:     Fear of current or ex partner: Not on file     Emotionally abused: Not on file     Physically abused: Not on file     Forced sexual activity: Not on file   Other Topics Concern     Parent/sibling w/ CABG, MI or angioplasty before 65F 55M? Not Asked      Service Not Asked     Blood Transfusions Not Asked     Caffeine Concern Not Asked     Occupational Exposure Not Asked     Hobby Hazards Not Asked     Sleep Concern No     Stress Concern No     Weight Concern Not Asked     Special Diet Not Asked     Back Care Not Asked     Exercise Yes     Comment: on occasion     Bike Helmet Not Asked     Seat Belt Not Asked     Self-Exams Not Asked   Social History Narrative     Not on file       Review of Systems:  Skin:          Eyes:         ENT:         Respiratory:          Cardiovascular:         Gastroenterology:        Genitourinary:         Musculoskeletal:         Neurologic:         Psychiatric:         Heme/Lymph/Imm:         Endocrine:           Physical Exam:  Vitals: There were no vitals taken for this visit.    Constitutional:  cooperative;in no acute distress        Skin:  warm and dry to the touch          Head:  no masses or lesions;normocephalic        Eyes:  sclera white        Lymph:      ENT:  no  pallor or cyanosis        Neck:  carotid pulses are full and equal bilaterally        Respiratory:  clear to auscultation         Cardiac: regular rhythm;normal S1 and S2;no murmurs, gallops or rubs detected                pulses full and equal                                   2+ left radial, 1+ right radial, 2+ femoral bilaterally,     GI:  abdomen soft        Extremities and Muscular Skeletal:  no edema              Neurological:  no gross motor deficits        Psych:           Thank you for allowing me to participate in the care of your patient.    Sincerely,     Mica Tobias MD     [facility]    cc:   Ruddy Carter MD  [facility]  [address] [place] [address]

## 2020-01-07 NOTE — PROGRESS NOTES
HPI and Plan:     I had the pleasure of seeing Ms. Ni in followup at the [facility] today.  She is a very pleasant 54-year-old female who I last saw in 09/2018.  She has a history of coronary artery disease and is status post angioplasty and stenting of a 95% LAD lesion in 08/2011.  She also has a history of fibromuscular dysplasia of the renal arteries and has undergone PTCA in the past.      Her last coronary angiogram was in 06/2014 at which point no significant obstructive disease was found.  She has also undergone a workup for thoracic outlet syndrome which has been unrevealing.     She presents today after recent hospitalization at [facility] in August of this year.  She presented with a 1 to 2-week history of intermittent left-sided chest discomfort although cardiac troponins were negative.  Given these symptoms, she was seen by cardiology as an inpatient and coronary angiography was recommended.  Ultimately she declined to proceed with this or with any noninvasive testing at the time.  Subsequently, however, she underwent an exercise nuclear stress test on 10/30/2019 as an outpatient which was negative for ischemia.  Left ventricular systolic function was normal with an LVEF of 59%.     She presents today feeling well overall from a cardiovascular standpoint.  She specifically denies any chest discomfort since her hospitalization. She denies any dyspnea on exertion, PND, orthopnea or lower extremity edema.  She denies any syncope or presyncope.  She is actually been working out at the gym on a regular basis without any limitations.  She is not any take any nitroglycerin.         Her physical exam is as dictated below.        Lipids on 12/30/2019 demonstrated total cholesterol 153, HDL 43, LDL of 82 and triglycerides of 139.     IMPRESSION:   1.  Coronary artery disease, status post drug-eluting stent placement to mid LAD in 2011.  She was recently admitted to  [facility] with chest discomfort with negative troponins and a subsequent negative exercise nuclear stress test.    2.  History of renal fibromuscular dysplasia, status post stenting.   3.  Hypertension.   4.  Dyslipidemia.       The patient is doing well overall from a cardiovascular standpoint.  There is no evidence of angina or congestive heart failure.  Her chest discomfort has fortunately resolved spontaneously and her stress perfusion findings are reassuring.  At this point in time I would recommend continuation of her current medical regimen.  I stressed the need for weight loss given the interval worsening in the lipid panel.  We will reassess this in approximately 3 to 6 months.    She also underwent a recent renal artery ultrasound on 12/30/2019 that demonstrated elevated flow velocities in the right renal artery which has been previously documented.  I will have our vascular colleagues review this.    It was a pleasure seeing her in follow-up.              CURRENT MEDICATIONS:  Current Outpatient Medications   Medication Sig Dispense Refill     albuterol (PROAIR HFA/PROVENTIL HFA/VENTOLIN HFA) 108 (90 Base) MCG/ACT inhaler Inhale 1-2 puffs into the lungs every 4 hours as needed for shortness of breath / dyspnea or wheezing       amLODIPine (NORVASC) 10 MG tablet Take 1 tablet (10 mg) by mouth daily 90 tablet 1     aspirin 81 MG EC tablet Take 81 mg by mouth daily       losartan (COZAAR) 100 MG tablet Take 1 tablet (100 mg) by mouth daily 90 tablet 3     metoprolol (TOPROL-XL) 25 MG 24 hr tablet Take 25 mg by mouth daily.       nitroGLYcerin (NITROSTAT) 0.4 MG sublingual tablet Place 1 tablet (0.4 mg) under the tongue every 5 minutes as needed for chest pain 25 tablet 1     omeprazole (PRILOSEC) 20 MG DR capsule Take 20 mg by mouth daily       rosuvastatin (CRESTOR) 40 MG tablet Take 1 tablet (40 mg) by mouth daily 90 tablet 3       ALLERGIES     Allergies   Allergen Reactions     Imdur  [Isosorbide] Other (See Comments)     Headache.  Patient took 30mg dose for 2 days and had HA on consecutive days.     Morphine Sulfate Nausea and Vomiting       PAST MEDICAL HISTORY:  Past Medical History:   Diagnosis Date     Asthma      Coronary artery disease     cardiac cath : JUDY to LAD and 1st diagonal     Fibromuscular dysplasia (H)      GERD (gastroesophageal reflux disease)      Hyperlipidemia LDL goal <70      Hypertension      MI (myocardial infarction) (H)      Peripheral vascular disease (H)     angioplasty bilat renal arteries     Renal artery stenosis (H)     Bilateral moderate renal stenosis noted on 13 renal ultrasound     Sleep apnea     mild per sleep study [facility] 7-10-15       PAST SURGICAL HISTORY:  Past Surgical History:   Procedure Laterality Date     COLONOSCOPY N/A 2015    Procedure: COLONOSCOPY;  Surgeon: Juan Miguel Garcia MD;  Location:  GI     GYN SURGERY       HYSTERECTOMY      partial       FAMILY HISTORY:  Family History   Problem Relation Age of Onset     Heart Disease Mother      Heart Disease Sister        SOCIAL HISTORY:  Social History     Socioeconomic History     Marital status: Single     Spouse name: Not on file     Number of children: Not on file     Years of education: Not on file     Highest education level: Not on file   Occupational History     Not on file   Social Needs     Financial resource strain: Not on file     Food insecurity:     Worry: Not on file     Inability: Not on file     Transportation needs:     Medical: Not on file     Non-medical: Not on file   Tobacco Use     Smoking status: Former Smoker     Packs/day: 1.00     Years: 20.00     Pack years: 20.00     Start date:      Last attempt to quit:      Years since quittin.0     Smokeless tobacco: Former User     Quit date: 1995   Substance and Sexual Activity     Alcohol use: Yes     Comment: wine on rare occasions      Drug use: No     Sexual activity:  Not on file   Lifestyle     Physical activity:     Days per week: Not on file     Minutes per session: Not on file     Stress: Not on file   Relationships     Social connections:     Talks on phone: Not on file     Gets together: Not on file     Attends Oriental orthodox service: Not on file     Active member of club or organization: Not on file     Attends meetings of clubs or organizations: Not on file     Relationship status: Not on file     Intimate partner violence:     Fear of current or ex partner: Not on file     Emotionally abused: Not on file     Physically abused: Not on file     Forced sexual activity: Not on file   Other Topics Concern     Parent/sibling w/ CABG, MI or angioplasty before 65F 55M? Not Asked      Service Not Asked     Blood Transfusions Not Asked     Caffeine Concern Not Asked     Occupational Exposure Not Asked     Hobby Hazards Not Asked     Sleep Concern No     Stress Concern No     Weight Concern Not Asked     Special Diet Not Asked     Back Care Not Asked     Exercise Yes     Comment: on occasion     Bike Helmet Not Asked     Seat Belt Not Asked     Self-Exams Not Asked   Social History Narrative     Not on file       Review of Systems:  Skin:          Eyes:         ENT:         Respiratory:          Cardiovascular:         Gastroenterology:        Genitourinary:         Musculoskeletal:         Neurologic:         Psychiatric:         Heme/Lymph/Imm:         Endocrine:           Physical Exam:  Vitals: There were no vitals taken for this visit.    Constitutional:  cooperative;in no acute distress        Skin:  warm and dry to the touch          Head:  no masses or lesions;normocephalic        Eyes:  sclera white        Lymph:      ENT:  no pallor or cyanosis        Neck:  carotid pulses are full and equal bilaterally        Respiratory:  clear to auscultation         Cardiac: regular rhythm;normal S1 and S2;no murmurs, gallops or rubs detected                pulses full and equal                                    2+ left radial, 1+ right radial, 2+ femoral bilaterally,     GI:  abdomen soft        Extremities and Muscular Skeletal:  no edema              Neurological:  no gross motor deficits        Psych:           CC  Ruddy Carter MD  [facility]  [address] [place] [address]

## 2020-01-20 NOTE — TELEPHONE ENCOUNTER
He didn't think we needed to pursue intervention unless her blood pressure control worsened. Thanks.

## 2020-11-25 DIAGNOSIS — I25.10 CORONARY ARTERY DISEASE INVOLVING NATIVE CORONARY ARTERY OF NATIVE HEART WITHOUT ANGINA PECTORIS: ICD-10-CM

## 2020-11-25 RX ORDER — NITROGLYCERIN 0.4 MG/1
0.4 TABLET SUBLINGUAL EVERY 5 MIN PRN
Qty: 25 TABLET | Refills: 1 | Status: SHIPPED | OUTPATIENT
Start: 2020-11-25 | End: 2022-02-14

## 2020-11-25 NOTE — TELEPHONE ENCOUNTER
Received refill request for:  NTG  Last OV was: 1/7/2020 with Dr. Tobias  Labs/EKG: n/a  F/U scheduled: orders in Epic for 1/2021  New script sent to: [facility]

## 2020-12-02 NOTE — ED AVS SNAPSHOT
Emergency Department    [address] [facility] [address]    Phone:  [phone]    Fax:  [phone]                                       Glenis Ni   MRN: [id number]    Department:   Emergency Department   Date of Visit:  4/3/2017           Patient Information     Date Of Birth          1965        Your diagnoses for this visit were:     Atypical chest pain     Gastroesophageal reflux disease without esophagitis        You were seen by Marissa Neal MD.      Follow-up Information     Follow up with Ruddy Carter MD. Schedule an appointment as soon as possible for a visit in 2 days.    Specialty:  Family Practice    Contact information:    [facility]  [address] [place] [address] [phone]          Discharge Instructions       Prilosec 2 times a day, liquid Maalox during the day as needed.  Recheck in the clinic in 2 days, return to the ER sooner if you develop severe chest heaviness with shortness of breath and sweating.    Discharge Instructions  Chest Pain    You have been seen today for chest pain or discomfort.  At this time, your doctor has found no signs that your chest pain is due to a serious or life-threatening condition, (or you have declined more testing and/or admission to the hospital). However, sometimes there is a serious problem that does not show up right away. Your evaluation today may not be complete and you may need further testing and evaluation.     You need to follow-up with your regular doctor within 3 days.    Return to the Emergency Department if:    Your chest pain changes, gets worse, starts to happen more often, or comes with less activity.    You are short of breath.    You get very weak or tired.    You pass out or faint.    You have any new symptoms, like fever, cough, numb legs, or you cough up blood.    You have anything else that worries you.    Until you follow-up with your regular doctor please do the  "Patient seen via telemedicine.  Care discussed with treatment team staff.  Blood pressure 119/72, pulse 63, temperature 96.6  F (35.9  C), temperature source Temporal, resp. rate 16, height 1.854 m (6' 1\"), weight 142.2 kg (313 lb 6.4 oz), SpO2 97 %.    Cognitive testing showed 21 on MoCA, 5.1 on CPT, visual spatial good    By report, slept 6.5 hours  Doing well on unit.  Appetite good  General appearance: good  Alert.   Affect: good  Moodd: good    Speech:  normal.   Eye contact:  good.    Psychomotor behavior: normal  Gait: normal.    Abnormal movements: none  Delusions: none  Hallucinations:   nine  Thoughts: logical, states he knows what he should have done different before admit; not scream at family  Associations: intact  Judgement: fair  Insight:good now  Cognitions: intact in conversation  Memory:  intact in conversation  Orientation: normal    Not suicidal.    Plan: start Namenda, increase Aricept  Discharge tomorrow      Current Facility-Administered Medications:      acetaminophen (TYLENOL) tablet 325 mg, 325 mg, Oral, Q6H, Naegele, Debra Ann, APRN CNS, 325 mg at 12/02/20 1208     alum & mag hydroxide-simethicone (MAALOX) suspension 30 mL, 30 mL, Oral, Q4H PRN, Naegele, Debra Ann, APRN CNS     azelastine (ASTELIN) nasal spray 1 spray, 1 spray, Both Nostrils, BID, Naegele, Debra Ann, APRN CNS, 1 spray at 12/02/20 0847     cyclobenzaprine (FLEXERIL) tablet 10 mg, 10 mg, Oral, TID, Naegele, Debra Ann, APRN CNS, 10 mg at 12/02/20 1315     diazepam (VALIUM) tablet 2 mg, 2 mg, Oral, Q12H PRN, Aurelio Veronica MD, 2 mg at 12/02/20 1212     donepezil (ARICEPT) tablet 15 mg, 15 mg, Oral, At Bedtime, Aurelio Veronica MD     folic acid (FOLVITE) tablet 5 mg, 5 mg, Oral, Daily, Aurelio Veronica MD, 5 mg at 12/02/20 0845     guanFACINE (TENEX) tablet 1 mg, 1 mg, Oral, At Bedtime, Naegele, Debra Ann, APRN CNS, 1 mg at 12/01/20 2108     HOLD: warfarin (COUMADIN) therapy, , Does not apply, HOLD, Maurice Snow PA-C     " following:    Take one aspirin daily unless you have an allergy or are told not to by your doctor.    If a stress test appointment has been made, go to the appointment.    If you have questions, contact your regular doctor.    If your doctor today has told you to follow-up with your regular doctor, it is very important that you make an appointment with your clinic and go to the appointment.  If you do not follow-up with your primary doctor, it may result in missing an important development which could result in permanent injury or disability and/or lasting pain.  If there is any problem keeping your appointment, call your doctor or return to the Emergency Department.    If you were given a prescription for medicine here today, be sure to read all of the information (including the package insert) that comes with your prescription.  This will include important information about the medicine, its side effects, and any warnings that you need to know about.  The pharmacist who fills the prescription can provide more information and answer questions you may have about the medicine.  If you have questions or concerns that the pharmacist cannot address, please call or return to the Emergency Department.     Opioid Medication Information    Pain medications are among the most commonly prescribed medicines, so we are including this information for all our patients. If you did not receive pain medication or get a prescription for pain medicine, you can ignore it.     You may have been given a prescription for an opioid (narcotic) pain medicine and/or have received a pain medicine while here in the Emergency Department. These medicines can make you drowsy or impaired. You must not drive, operate dangerous equipment, or engage in any other dangerous activities while taking these medications. If you drive while taking these medications, you could be arrested for DUI, or driving under the influence. Do not drink any alcohol while  hydrOXYzine (ATARAX) tablet 25 mg, 25 mg, Oral, Q4H PRN, Naegele, Debra Ann, APRN CNS, 25 mg at 11/27/20 2340     levothyroxine (SYNTHROID/LEVOTHROID) tablet 150 mcg, 150 mcg, Oral, Daily, Naegele, Debra Ann, APRN CNS, 150 mcg at 12/02/20 0845     liothyronine (CYTOMEL) tablet 25 mcg, 25 mcg, Oral, Daily, Aurelio Veronica MD, 25 mcg at 12/02/20 0846     melatonin tablet 3 mg, 3 mg, Oral, At Bedtime PRN, Naegele, Debra Ann, APRN CNS, 3 mg at 11/27/20 2340     memantine (NAMENDA) tablet 5 mg, 5 mg, Oral, Daily, Aurelio Veronica MD, 5 mg at 12/02/20 1208     mirabegron (MYRBETRIQ) 24 hr tablet 50 mg, 50 mg, Oral, Daily, Naegele, Debra Ann, APRN CNS, 50 mg at 12/01/20 1731     pregabalin (LYRICA) capsule 100 mg, 100 mg, Oral, TID, Naegele, Debra Ann, APRN CNS, 100 mg at 12/02/20 1315     propafenone (RYTHMOL) tablet 150 mg, 150 mg, Oral, Q8H, Naegele, Debra Ann, APRN CNS, 150 mg at 12/02/20 1314     senna-docusate (SENOKOT-S/PERICOLACE) 8.6-50 MG per tablet 1 tablet, 1 tablet, Oral, BID PRN, Naegele, Debra Ann, APRN CNS, 1 tablet at 11/30/20 1455     simvastatin (ZOCOR) tablet 40 mg, 40 mg, Oral, At Bedtime, Naegele, Debra Ann, APRN CNS, 40 mg at 12/01/20 2110     venlafaxine (EFFEXOR-XR) 24 hr capsule 300 mg, 300 mg, Oral, Daily with breakfast, Naegele, Debra Ann, APRN CNS, 300 mg at 12/02/20 0846  Recent Results (from the past 168 hour(s))   EKG 12-lead, tracing only    Collection Time: 11/27/20  3:46 PM   Result Value Ref Range    Interpretation ECG Click View Image link to view waveform and result    CBC with platelets differential    Collection Time: 11/27/20  3:52 PM   Result Value Ref Range    WBC 6.9 4.0 - 11.0 10e9/L    RBC Count 5.53 4.4 - 5.9 10e12/L    Hemoglobin 17.9 (H) 13.3 - 17.7 g/dL    Hematocrit 51.1 40.0 - 53.0 %    MCV 92 78 - 100 fl    MCH 32.4 26.5 - 33.0 pg    MCHC 35.0 31.5 - 36.5 g/dL    RDW 13.1 10.0 - 15.0 %    Platelet Count 222 150 - 450 10e9/L    Diff Method Automated Method     % Neutrophils  68.9 %    % Lymphocytes 19.0 %    % Monocytes 9.6 %    % Eosinophils 1.5 %    % Basophils 0.6 %    % Immature Granulocytes 0.4 %    Nucleated RBCs 0 0 /100    Absolute Neutrophil 4.7 1.6 - 8.3 10e9/L    Absolute Lymphocytes 1.3 0.8 - 5.3 10e9/L    Absolute Monocytes 0.7 0.0 - 1.3 10e9/L    Absolute Eosinophils 0.1 0.0 - 0.7 10e9/L    Absolute Basophils 0.0 0.0 - 0.2 10e9/L    Abs Immature Granulocytes 0.0 0 - 0.4 10e9/L    Absolute Nucleated RBC 0.0    INR    Collection Time: 11/27/20  3:52 PM   Result Value Ref Range    INR 2.87 (H) 0.86 - 1.14   Comprehensive metabolic panel    Collection Time: 11/27/20  3:52 PM   Result Value Ref Range    Sodium 139 133 - 144 mmol/L    Potassium 3.8 3.4 - 5.3 mmol/L    Chloride 105 94 - 109 mmol/L    Carbon Dioxide 30 20 - 32 mmol/L    Anion Gap 4 3 - 14 mmol/L    Glucose 129 (H) 70 - 99 mg/dL    Urea Nitrogen 16 7 - 30 mg/dL    Creatinine 0.98 0.66 - 1.25 mg/dL    GFR Estimate 82 >60 mL/min/[1.73_m2]    GFR Estimate If Black >90 >60 mL/min/[1.73_m2]    Calcium 8.4 (L) 8.5 - 10.1 mg/dL    Bilirubin Total 1.7 (H) 0.2 - 1.3 mg/dL    Albumin 3.6 3.4 - 5.0 g/dL    Protein Total 7.0 6.8 - 8.8 g/dL    Alkaline Phosphatase 85 40 - 150 U/L    ALT 39 0 - 70 U/L    AST 28 0 - 45 U/L   Troponin I    Collection Time: 11/27/20  3:52 PM   Result Value Ref Range    Troponin I ES <0.015 0.000 - 0.045 ug/L   CRP inflammation    Collection Time: 11/27/20  3:52 PM   Result Value Ref Range    CRP Inflammation 2.9 0.0 - 8.0 mg/L   Alcohol ethyl    Collection Time: 11/27/20  3:52 PM   Result Value Ref Range    Ethanol g/dL <0.01 <0.01 g/dL   Acetaminophen level    Collection Time: 11/27/20  3:52 PM   Result Value Ref Range    Acetaminophen Level <2 mg/L   Salicylate level    Collection Time: 11/27/20  3:52 PM   Result Value Ref Range    Salicylate Level <2 mg/dL   UA with Microscopic    Collection Time: 11/27/20  6:05 PM   Result Value Ref Range    Color Urine Yellow     Appearance Urine Clear      you are taking these medications.     Opioid pain medications can cause addiction. If you have a history of chemical dependency of any type, you are at a higher risk of becoming addicted to pain medications.  Only take these prescribed medications to treat your pain when all other options have been tried. Take it for as short a time and as few doses as possible. Store your pain pills in a secure place, as they are frequently stolen and provide a dangerous opportunity for children or visitors in your house to start abusing these powerful medications. We will not replace any lost or stolen medicine.  As soon as your pain is better, you should flush all your remaining medication.     Many prescription pain medications contain Tylenol  (acetaminophen), including Vicodin , Tylenol #3 , Norco , Lortab , and Percocet .  You should not take any extra pills of Tylenol  if you are using these prescription medications or you can get very sick.  Do not ever take more than 3000 mg of acetaminophen in any 24 hour period.    All opioids tend to cause constipation. Drink plenty of water and eat foods that have a lot of fiber, such as fruits, vegetables, prune juice, apple juice and high fiber cereal.  Take a laxative if you don t move your bowels at least every other day. Miralax , Milk of Magnesia, Colace , or Senna  can be used to keep you regular.      Remember that you can always come back to the Emergency Department if you are not able to see your regular doctor in the amount of time listed above, if you get any new symptoms, or if there is anything that worries you.          What Is Acid Reflux?    Do you have to clear your throat or cough often? Are you hoarse? Do you have difficulty swallowing? If you have these or other throat symptoms, you may have acid reflux (when stomach acid washes up and irritates your throat).  Why You Have Throat Symptoms  At both ends of the esophagus (the tube that carries food to the stomach)  Glucose Urine Negative NEG^Negative mg/dL    Bilirubin Urine Negative NEG^Negative    Ketones Urine Negative NEG^Negative mg/dL    Specific Gravity Urine 1.016 1.003 - 1.035    Blood Urine Trace (A) NEG^Negative    pH Urine 5.5 5.0 - 7.0 pH    Protein Albumin Urine 10 (A) NEG^Negative mg/dL    Urobilinogen mg/dL Normal 0.0 - 2.0 mg/dL    Nitrite Urine Negative NEG^Negative    Leukocyte Esterase Urine Negative NEG^Negative    Source Midstream Urine     WBC Urine 1 0 - 5 /HPF    RBC Urine 3 (H) 0 - 2 /HPF    Squamous Epithelial /HPF Urine <1 0 - 1 /HPF    Mucous Urine Present (A) NEG^Negative /LPF   Drug abuse screen 6 urine (chem dep)    Collection Time: 11/27/20  6:05 PM   Result Value Ref Range    Amphetamine Qual Urine Negative NEG^Negative    Barbiturates Qual Urine Negative NEG^Negative    Benzodiazepine Qual Urine Positive (A) NEG^Negative    Cannabinoids Qual Urine Negative NEG^Negative    Cocaine Qual Urine Negative NEG^Negative    Ethanol Qual Urine Negative NEG^Negative    Opiates Qualitative Urine Negative NEG^Negative   Asymptomatic COVID-19 Virus (Coronavirus) by PCR    Collection Time: 11/27/20  8:04 PM    Specimen: Nasopharyngeal   Result Value Ref Range    COVID-19 Virus PCR to [facility] - Source Nasopharyngeal     COVID-19 Virus PCR to [facility] - Result       Test received-See reflex to IDDL test SARS CoV2 (COVID-19) Virus RT-PCR   SARS-CoV-2 COVID-19 Virus (Coronavirus) RT-PCR Nasopharyngeal    Collection Time: 11/27/20  8:04 PM    Specimen: Nasopharyngeal   Result Value Ref Range    SARS-CoV-2 Virus Specimen Source Nasopharyngeal     SARS-CoV-2 PCR Result NEGATIVE     SARS-CoV-2 PCR Comment       Testing was performed using the Aptima SARS-CoV-2 Assay on the [employer] Instrument System.   Additional information about this Emergency Use Authorization (EUA) assay can be found via   the Lab Guide.     INR    Collection Time: 11/27/20 11:42 PM   Result Value Ref Range    INR 3.78 (H) 0.86 - 1.14   INR     are the esophageal sphincters. These muscles relax to let food pass, then tighten to keep stomach acid down. When the lower esophageal sphincter (LES) doesn t tighten enough, acid can reflux from the stomach into the esophagus. This may cause heartburn. If the upper esophageal sphincter (UES) also doesn t work well, acid can travel higher and enter your throat (pharynx). In many cases, this causes throat symptoms.  Common Throat Symptoms    Frequent need to clear your throat    Feeling like you re choking    Chronic cough    Hoarseness    Trouble swallowing    Sensation of having  a lump in the throat     Sour or acid taste    Recurrent sore throat     0193-9127 [employer]. [address] [place] [address]. All rights reserved. This information is not intended as a substitute for professional medical care. Always follow your healthcare professional's instructions.          Future Appointments        Provider Department Dept Phone Center    4/18/2017 7:40 AM PAULO Martinez P Heart Lab [facility] [place] [phone] [facility]    4/18/2017 8:45 AM Mica Tobias MD [facility] AT [place] [address] [phone] [facility]      24 Hour Appointment Hotline       To make an appointment at any [facility], call [phone] ([phone]). If you don't have a family doctor or clinic, we will help you find one. [facility] clinics are conveniently located to serve the needs of you and your family.             Review of your medicines      START taking        Dose / Directions Last dose taken    omeprazole 20 MG CR capsule   Commonly known as:  priLOSEC   Dose:  20 mg   Quantity:  20 capsule        Take 1 capsule (20 mg) by mouth 2 times daily   Refills:  0          Our records show that you are taking the medicines listed below. If these are incorrect, please call your family doctor or clinic.        Dose / Directions Last dose taken     amLODIPine 5 MG tablet   Commonly known as:  NORVASC   Dose:  5 mg   Quantity:  30 tablet        Take 1 tablet (5 mg) by mouth daily   Refills:  3        ASPIRIN PO   Dose:  325 mg        Take 325 mg by mouth daily   Refills:  0        CRESTOR PO   Dose:  20 mg        Take 20 mg by mouth daily   Refills:  0        losartan 100 MG tablet   Commonly known as:  COZAAR   Dose:  100 mg   Quantity:  30 tablet        Take 1 tablet (100 mg) by mouth daily   Refills:  3        metoprolol 25 MG 24 hr tablet   Commonly known as:  TOPROL-XL   Dose:  25 mg        Take 25 mg by mouth daily.   Refills:  0        nitroglycerin 0.4 MG sublingual tablet   Commonly known as:  NITROSTAT   Dose:  0.4 mg   Quantity:  25 tablet        Place 1 tablet (0.4 mg) under the tongue every 5 minutes as needed for chest pain   Refills:  3        spironolactone 25 MG tablet   Commonly known as:  ALDACTONE   Dose:  25 mg   Quantity:  90 tablet        Take 1 tablet (25 mg) by mouth daily   Refills:  3                Prescriptions were sent or printed at these locations (1 Prescription)                   Other Prescriptions                Printed at Department/Unit printer (1 of 1)         omeprazole (PRILOSEC) 20 MG CR capsule                Procedures and tests performed during your visit     Procedure/Test Number of Times Performed    Basic metabolic panel 1    CBC with platelets differential 1    EKG 12-lead, tracing only 2    Nt probnp inpatient (BNP) 1    Peripheral IV: Standard 1    Troponin I 1    Vital signs 1    XR Chest 2 Views 1      Orders Needing Specimen Collection     None      Pending Results     No orders found from 4/1/2017 to 4/4/2017.            Pending Culture Results     No orders found from 4/1/2017 to 4/4/2017.             Test Results from your hospital stay     4/3/2017 10:23 AM - Interface, [employer] Results      Component Results     Component Value Ref Range & Units Status    WBC 7.5 4.0 - 11.0 10e9/L Final    RBC Count  Collection Time: 11/28/20  7:48 AM   Result Value Ref Range    INR 5.71 (HH) 0.86 - 1.14   INR    Collection Time: 11/28/20  4:02 PM   Result Value Ref Range    INR 8.73 (HH) 0.86 - 1.14   INR    Collection Time: 11/28/20 10:46 PM   Result Value Ref Range    INR >10.00 (HH) 0.86 - 1.14   INR    Collection Time: 11/29/20  6:21 AM   Result Value Ref Range    INR 5.25 (HH) 0.86 - 1.14   INR    Collection Time: 11/29/20 12:34 PM   Result Value Ref Range    INR 4.21 (H) 0.86 - 1.14   INR    Collection Time: 11/29/20  6:39 PM   Result Value Ref Range    INR 4.30 (H) 0.86 - 1.14   INR    Collection Time: 11/30/20 12:44 AM   Result Value Ref Range    INR 5.02 (HH) 0.86 - 1.14   INR    Collection Time: 11/30/20  7:48 AM   Result Value Ref Range    INR 5.11 (HH) 0.86 - 1.14   INR    Collection Time: 11/30/20 12:47 PM   Result Value Ref Range    INR 5.40 (HH) 0.86 - 1.14   INR    Collection Time: 11/30/20  6:41 PM   Result Value Ref Range    INR 6.60 (HH) 0.86 - 1.14   TSH with free T4 reflex    Collection Time: 11/30/20  6:41 PM   Result Value Ref Range    TSH 2.17 0.40 - 4.00 mU/L   INR    Collection Time: 11/30/20 11:57 PM   Result Value Ref Range    INR 7.60 (HH) 0.86 - 1.14   INR    Collection Time: 12/01/20  6:37 AM   Result Value Ref Range    INR 7.63 (HH) 0.86 - 1.14   INR    Collection Time: 12/01/20 12:31 PM   Result Value Ref Range    INR 7.86 (HH) 0.86 - 1.14   INR    Collection Time: 12/01/20  7:00 PM   Result Value Ref Range    INR 8.78 (HH) 0.86 - 1.14   INR    Collection Time: 12/02/20 12:19 AM   Result Value Ref Range    INR 8.41 (HH) 0.86 - 1.14   INR    Collection Time: 12/02/20  8:36 AM   Result Value Ref Range    INR 7.28 () 0.86 - 1.14       Video-Visit Details    Type of service:  Video Visit    Video Start Time (time video started): 1020    Video End Time (time video stopped): 1040    Originating Location (pt. Location): [facility]    Distant Location (provider location): Provider remote  4.97 3.8 - 5.2 10e12/L Final    Hemoglobin 14.1 11.7 - 15.7 g/dL Final    Hematocrit 42.1 35.0 - 47.0 % Final    MCV 85 78 - 100 fl Final    MCH 28.4 26.5 - 33.0 pg Final    MCHC 33.5 31.5 - 36.5 g/dL Final    RDW 12.7 10.0 - 15.0 % Final    Platelet Count 315 150 - 450 10e9/L Final    Diff Method Automated Method  Final    % Neutrophils 51.1 % Final    % Lymphocytes 34.8 % Final    % Monocytes 7.3 % Final    % Eosinophils 6.0 % Final    % Basophils 0.5 % Final    % Immature Granulocytes 0.3 % Final    Nucleated RBCs 0 0 /100 Final    Absolute Neutrophil 3.8 1.6 - 8.3 10e9/L Final    Absolute Lymphocytes 2.6 0.8 - 5.3 10e9/L Final    Absolute Monocytes 0.6 0.0 - 1.3 10e9/L Final    Absolute Eosinophils 0.5 0.0 - 0.7 10e9/L Final    Absolute Basophils 0.0 0.0 - 0.2 10e9/L Final    Abs Immature Granulocytes 0.0 0 - 0.4 10e9/L Final    Absolute Nucleated RBC 0.0  Final         4/3/2017 10:31 AM - Interface, [employer] Results      Component Results     Component Value Ref Range & Units Status    Sodium 140 133 - 144 mmol/L Final    Potassium 3.6 3.4 - 5.3 mmol/L Final    Chloride 105 94 - 109 mmol/L Final    Carbon Dioxide 27 20 - 32 mmol/L Final    Anion Gap 8 3 - 14 mmol/L Final    Glucose 91 70 - 99 mg/dL Final    Urea Nitrogen 7 7 - 30 mg/dL Final    Creatinine 0.71 0.52 - 1.04 mg/dL Final    GFR Estimate 86 >60 mL/min/1.7m2 Final    Non  GFR Calc    GFR Estimate If Black >90   GFR Calc   >60 mL/min/1.7m2 Final    Calcium 9.2 8.5 - 10.1 mg/dL Final         4/3/2017 10:35 AM - Interface, [employer] Results      Component Results     Component Value Ref Range & Units Status    Troponin I ES  0.000 - 0.045 ug/L Final    <0.015  The 99th percentile for upper reference range is 0.045 ug/L.  Troponin values in   the range of 0.045 - 0.120 ug/L may be associated with risks of adverse   clinical events.           4/3/2017 10:35 AM - Interface, [employer] Results      Component Results      location    Mode of Communication:  Video Conference via [employer]    Physician has received verbal consent for a Video Visit from the patient? Yes      Aurelio Veronica MD        Component Value Ref Range & Units Status    N-Terminal Pro BNP Inpatient 43 0 - 900 pg/mL Final    Reference range shown and results flagged as abnormal are suggested inpatient   cut points for confirming diagnosis if CHF in an acute setting. Establishing   a   baseline value for each individual patient is useful for follow-up. An   inpatient or emergency department NT-proPBNP <300 pg/mL effectively rules out   acute CHF, with 99% negative predictive value.  The outpatient non-acute reference range for ruling out CHF is:   0-125 pg/mL (age 18 to less than 75)   0-450 pg/mL (age 75 yrs and older)           4/3/2017 10:55 AM - Interface, [employer] Ib      Narrative     XR CHEST 2 VW 4/3/2017 10:26 AM    HISTORY: Pain.    COMPARISON: January 2, 2014.        Impression     IMPRESSION: The lungs are clear. No focal pulmonary opacities. Heart  and mediastinum are unremarkable. No acute cardiopulmonary  abnormalities.    HAIDER REYES MD                Clinical Quality Measure: Blood Pressure Screening     Your blood pressure was checked while you were in the emergency department today. The last reading we obtained was  BP: 143/78 . Please read the guidelines below about what these numbers mean and what you should do about them.  If your systolic blood pressure (the top number) is less than 120 and your diastolic blood pressure (the bottom number) is less than 80, then your blood pressure is normal. There is nothing more that you need to do about it.  If your systolic blood pressure (the top number) is 120-139 or your diastolic blood pressure (the bottom number) is 80-89, your blood pressure may be higher than it should be. You should have your blood pressure rechecked within a year by a primary care provider.  If your systolic blood pressure (the top number) is 140 or greater or your diastolic blood pressure (the bottom number) is 90 or greater, you may have high blood pressure. High blood pressure is treatable, but if left  "untreated over time it can put you at risk for heart attack, stroke, or kidney failure. You should have your blood pressure rechecked by a primary care provider within the next 4 weeks.  If your provider in the emergency department today gave you specific instructions to follow-up with your doctor or provider even sooner than that, you should follow that instruction and not wait for up to 4 weeks for your follow-up visit.        Thank you for choosing [facility]       Thank you for choosing [facility] for your care. Our goal is always to provide you with excellent care. Hearing back from our patients is one way we can continue to improve our services. Please take a few minutes to complete the written survey that you may receive in the mail after you visit with us. Thank you!        [employer] Information     hereO lets you send messages to your doctor, view your test results, renew your prescriptions, schedule appointments and more. To sign up, go to [email or web address] . Click on \"Log in\" on the left side of the screen, which will take you to the Welcome page. Then click on \"Sign up Now\" on the right side of the page.     You will be asked to enter the access code listed below, as well as some personal information. Please follow the directions to create your username and password.     Your access code is: [email or web address]  Expires: 2017 11:43 AM     Your access code will  in 90 days. If you need help or a new code, please call your [facility] clinic or [phone].        Care EveryWhere ID     This is your Care EveryWhere ID. This could be used by other organizations to access your [facility] medical records  [id number]        After Visit Summary       This is your record. Keep this with you and show to your community pharmacist(s) and doctor(s) at your next visit.                  "

## 2021-01-12 ENCOUNTER — TELEPHONE (OUTPATIENT)
Dept: CARDIOLOGY | Facility: CLINIC | Age: 56
End: 2021-01-12

## 2021-01-12 DIAGNOSIS — I10 BENIGN ESSENTIAL HYPERTENSION: ICD-10-CM

## 2021-01-12 DIAGNOSIS — I25.110 CORONARY ARTERY DISEASE INVOLVING NATIVE CORONARY ARTERY OF NATIVE HEART WITH UNSTABLE ANGINA PECTORIS (H): Primary | ICD-10-CM

## 2021-01-12 DIAGNOSIS — E78.2 MIXED HYPERLIPIDEMIA: ICD-10-CM

## 2021-01-21 ENCOUNTER — TELEPHONE (OUTPATIENT)
Dept: CARDIOLOGY | Facility: CLINIC | Age: 56
End: 2021-01-21

## 2021-01-21 NOTE — TELEPHONE ENCOUNTER
Pt due for BMP/FLP/ALT prior to f/up w/ Dr Tobias, not yet scheduled. Pt requests that lab orders be faxed to primary care so that she can have them done there. Message to in-clinic RN to send. Pt aware to fast.

## 2021-01-21 NOTE — TELEPHONE ENCOUNTER
Faxed order for flp/alt and bmp to PCP clinic @ [phone].      1/26/2021: received lab results from [facility]. Labs entered into grid and sent to scan

## 2021-01-26 ENCOUNTER — TRANSFERRED RECORDS (OUTPATIENT)
Dept: HEALTH INFORMATION MANAGEMENT | Facility: CLINIC | Age: 56
End: 2021-01-26

## 2021-01-26 LAB
ANION GAP SERPL CALCULATED.3IONS-SCNC: 9 MMOL/L
BUN SERPL-MCNC: 10 MG/DL
CALCIUM SERPL-MCNC: 9.1 MG/DL
CHLORIDE SERPLBLD-SCNC: 105 MMOL/L
CHOLEST SERPL-MCNC: 148 MG/DL
CO2 SERPL-SCNC: 28 MMOL/L
CREAT SERPL-MCNC: 0.86 MG/DL
GFR SERPL CREATININE-BSD FRML MDRD: >60 ML/MIN/1.73M2
GLUCOSE SERPL-MCNC: 80 MG/DL (ref 70–100)
HDLC SERPL-MCNC: 39 MG/DL
LDLC SERPL CALC-MCNC: 87 MG/DL
NONHDLC SERPL-MCNC: NORMAL MG/DL
POTASSIUM SERPL-SCNC: 3.2 MMOL/L
SODIUM SERPL-SCNC: 142 MMOL/L
TRIGL SERPL-MCNC: 111 MG/DL

## 2021-01-27 ENCOUNTER — OFFICE VISIT (OUTPATIENT)
Dept: CARDIOLOGY | Facility: CLINIC | Age: 56
End: 2021-01-27
Payer: COMMERCIAL

## 2021-01-27 VITALS
HEART RATE: 63 BPM | OXYGEN SATURATION: 98 % | DIASTOLIC BLOOD PRESSURE: 84 MMHG | WEIGHT: 213 LBS | BODY MASS INDEX: 36.37 KG/M2 | SYSTOLIC BLOOD PRESSURE: 136 MMHG | HEIGHT: 64 IN

## 2021-01-27 DIAGNOSIS — E66.01 MORBID OBESITY (H): ICD-10-CM

## 2021-01-27 DIAGNOSIS — I25.110 CORONARY ARTERY DISEASE INVOLVING NATIVE CORONARY ARTERY OF NATIVE HEART WITH UNSTABLE ANGINA PECTORIS (H): Primary | ICD-10-CM

## 2021-01-27 PROCEDURE — 99214 OFFICE O/P EST MOD 30 MIN: CPT | Performed by: INTERNAL MEDICINE

## 2021-01-27 RX ORDER — EZETIMIBE 10 MG/1
10 TABLET ORAL DAILY
Qty: 90 TABLET | Refills: 3 | Status: SHIPPED | OUTPATIENT
Start: 2021-01-27 | End: 2022-02-07

## 2021-01-27 ASSESSMENT — MIFFLIN-ST. JEOR: SCORE: 1541.16

## 2021-01-27 NOTE — LETTER
1/27/2021    DO [facility] [address] [place] [address]    RE: Glenis Ni       Dear Colleague,    I had the pleasure of seeing Glenis Ni in the [facility].    HPI and Plan:     I had the pleasure of seeing Ms. Ni in followup at the [facility] today.  She is a very pleasant 55-year-old female who I last saw in 01/2020.  She has a history of coronary artery disease and is status post angioplasty and stenting of a 95% LAD lesion in 08/2011.  She also has a history of fibromuscular dysplasia of the renal arteries and has undergone PTCA in the past.      Her last coronary angiogram was in 06/2014 at which point no significant obstructive disease was found. She has also undergone a workup for thoracic outlet syndrome which has been unrevealing.     Prior to her last office visit she had been complaining of some chest discomfort and a subsequent stress perfusion study in October 2019 was negative for ischemia.  She had also undergone a renal artery ultrasound in December 2019 that did demonstrate elevated velocities in the right renal artery.  I discussed this with Dr. Taveras, who recommended continued medical therapy given that her blood pressure was well controlled.     She presents today feeling well overall from a cardiovascular standpoint.  She specifically denies any chest discomfort since her hospitalization. She denies any dyspnea on exertion, PND, orthopnea or lower extremity edema.  She denies any syncope or presyncope.    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    Her physical exam is as dictated below.        Lipids on 1/26/2021 demonstrated total cholesterol of 148, HDL of 39, LDL of 87 and triglycerides of 111.       IMPRESSION:   1.  Coronary artery  disease, status post drug-eluting stent placement to mid LAD in 2011.  Her most recent stress perfusion study from 2019 was negative for ischemia.    2.  History of renal fibromuscular dysplasia, status post stenting.  She was noted to have elevated velocities in the right renal artery on an ultrasound from 12/2019.  Continued medical therapy was recommended.  3.  Hypertension which is well controlled.  4.  Dyslipidemia.         The patient is doing well overall from a cardiovascular standpoint.  There is no evidence of angina or congestive heart failure.     We did discuss the interval worsening in her lipid panel over the past few years and I think it be reasonable to address this with the addition of Zetia.  We will obtain a follow-up lipid panel in approximately 3 months to assess the efficacy of this intervention.     It was a pleasure seeing her in follow-up.       CURRENT MEDICATIONS:  Current Outpatient Medications   Medication Sig Dispense Refill     albuterol (PROAIR HFA/PROVENTIL HFA/VENTOLIN HFA) 108 (90 Base) MCG/ACT inhaler Inhale 1-2 puffs into the lungs every 4 hours as needed for shortness of breath / dyspnea or wheezing       amLODIPine (NORVASC) 10 MG tablet Take 1 tablet (10 mg) by mouth daily 90 tablet 1     aspirin 81 MG EC tablet Take 81 mg by mouth daily       losartan (COZAAR) 100 MG tablet Take 1 tablet (100 mg) by mouth daily 90 tablet 3     metoprolol (TOPROL-XL) 25 MG 24 hr tablet Take 25 mg by mouth daily.       nitroGLYcerin (NITROSTAT) 0.4 MG sublingual tablet Place 1 tablet (0.4 mg) under the tongue every 5 minutes as needed for chest pain 25 tablet 1     omeprazole (PRILOSEC) 20 MG DR capsule Take 20 mg by mouth daily       rosuvastatin (CRESTOR) 40 MG tablet Take 1 tablet (40 mg) by mouth daily 90 tablet 3       ALLERGIES     Allergies   Allergen Reactions     Imdur [Isosorbide] Other (See Comments)     Headache.  Patient took 30mg dose for 2 days and had HA on consecutive days.      Morphine Sulfate Nausea and Vomiting       PAST MEDICAL HISTORY:  Past Medical History:   Diagnosis Date     Asthma      Coronary artery disease     cardiac cath 2011: JUDY to LAD and 1st diagonal     Fibromuscular dysplasia (H)      GERD (gastroesophageal reflux disease)      Hyperlipidemia LDL goal <70      Hypertension      MI (myocardial infarction) (H)      Peripheral vascular disease (H)     angioplasty bilat renal arteries     Renal artery stenosis (H)     Bilateral moderate renal stenosis noted on 13 renal ultrasound     Sleep apnea     mild per sleep study [facility] 7-10-15       PAST SURGICAL HISTORY:  Past Surgical History:   Procedure Laterality Date     COLONOSCOPY N/A 2015    Procedure: COLONOSCOPY;  Surgeon: Juan Miguel Garcia MD;  Location:  GI     GYN SURGERY       HYSTERECTOMY      partial       FAMILY HISTORY:  Family History   Problem Relation Age of Onset     Heart Disease Mother      Heart Disease Sister        SOCIAL HISTORY:  Social History     Socioeconomic History     Marital status: Single     Spouse name: Not on file     Number of children: Not on file     Years of education: Not on file     Highest education level: Not on file   Occupational History     Not on file   Social Needs     Financial resource strain: Not on file     Food insecurity     Worry: Not on file     Inability: Not on file     Transportation needs     Medical: Not on file     Non-medical: Not on file   Tobacco Use     Smoking status: Former Smoker     Packs/day: 1.00     Years: 20.00     Pack years: 20.00     Start date:      Quit date:      Years since quittin.0     Smokeless tobacco: Former User     Quit date: 1995   Substance and Sexual Activity     Alcohol use: Yes     Comment: wine on rare occasions      Drug use: No     Sexual activity: Not on file   Lifestyle     Physical activity     Days per week: Not on file     Minutes per session: Not on file     Stress:  Not on file   Relationships     Social connections     Talks on phone: Not on file     Gets together: Not on file     Attends Synagogue service: Not on file     Active member of club or organization: Not on file     Attends meetings of clubs or organizations: Not on file     Relationship status: Not on file     Intimate partner violence     Fear of current or ex partner: Not on file     Emotionally abused: Not on file     Physically abused: Not on file     Forced sexual activity: Not on file   Other Topics Concern     Parent/sibling w/ CABG, MI or angioplasty before 65F 55M? Not Asked      Service Not Asked     Blood Transfusions Not Asked     Caffeine Concern Not Asked     Occupational Exposure Not Asked     Hobby Hazards Not Asked     Sleep Concern No     Stress Concern No     Weight Concern Not Asked     Special Diet Not Asked     Back Care Not Asked     Exercise Yes     Comment: on occasion     Bike Helmet Not Asked     Seat Belt Not Asked     Self-Exams Not Asked   Social History Narrative     Not on file       Review of Systems:  Skin:          Eyes:         ENT:         Respiratory:          Cardiovascular:         Gastroenterology:        Genitourinary:         Musculoskeletal:         Neurologic:         Psychiatric:         Heme/Lymph/Imm:         Endocrine:           Physical Exam:  Vitals: There were no vitals taken for this visit.    Constitutional:           Skin:             Head:           Eyes:           Lymph:      ENT:           Neck:           Respiratory:            Cardiac:                                                           GI:           Extremities and Muscular Skeletal:                 Neurological:           Psych:           CC  No referring provider defined for this encounter.                  Thank you for allowing me to participate in the care of your patient.      Sincerely,     Mica Tobias MD     [facility]    cc:   No referring  provider defined for this encounter.

## 2021-01-27 NOTE — LETTER
1/27/2021    DO [facility] [address] [place] [address]    RE: Glenis Ni       Dear Colleague,    I had the pleasure of seeing Glenis Ni in the [facility].    HPI and Plan:     I had the pleasure of seeing Ms. Ni in followup at the [facility] today.  She is a very pleasant 55-year-old female who I last saw in 01/2020.  She has a history of coronary artery disease and is status post angioplasty and stenting of a 95% LAD lesion in 08/2011.  She also has a history of fibromuscular dysplasia of the renal arteries and has undergone PTCA in the past.      Her last coronary angiogram was in 06/2014 at which point no significant obstructive disease was found. She has also undergone a workup for thoracic outlet syndrome which has been unrevealing.     Prior to her last office visit she had been complaining of some chest discomfort and a subsequent stress perfusion study in October 2019 was negative for ischemia.  She had also undergone a renal artery ultrasound in December 2019 that did demonstrate elevated velocities in the right renal artery.  I discussed this with Dr. Taveras, who recommended continued medical therapy given that her blood pressure was well controlled.     She presents today feeling well overall from a cardiovascular standpoint.  She specifically denies any chest discomfort since her hospitalization. She denies any dyspnea on exertion, PND, orthopnea or lower extremity edema.  She denies any syncope or presyncope.    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    Her physical exam is as dictated below.        Lipids on 1/26/2021 demonstrated total cholesterol of 148, HDL of 39, LDL of 87 and triglycerides of 111.       IMPRESSION:   1.  Coronary artery  disease, status post drug-eluting stent placement to mid LAD in 2011.  Her most recent stress perfusion study from 2019 was negative for ischemia.    2.  History of renal fibromuscular dysplasia, status post stenting.  She was noted to have elevated velocities in the right renal artery on an ultrasound from 12/2019.  Continued medical therapy was recommended.  3.  Hypertension which is well controlled.  4.  Dyslipidemia.         The patient is doing well overall from a cardiovascular standpoint.  There is no evidence of angina or congestive heart failure.     We did discuss the interval worsening in her lipid panel over the past few years and I think it be reasonable to address this with the addition of Zetia.  We will obtain a follow-up lipid panel in approximately 3 months to assess the efficacy of this intervention.     It was a pleasure seeing her in follow-up.       CURRENT MEDICATIONS:  Current Outpatient Medications   Medication Sig Dispense Refill     albuterol (PROAIR HFA/PROVENTIL HFA/VENTOLIN HFA) 108 (90 Base) MCG/ACT inhaler Inhale 1-2 puffs into the lungs every 4 hours as needed for shortness of breath / dyspnea or wheezing       amLODIPine (NORVASC) 10 MG tablet Take 1 tablet (10 mg) by mouth daily 90 tablet 1     aspirin 81 MG EC tablet Take 81 mg by mouth daily       losartan (COZAAR) 100 MG tablet Take 1 tablet (100 mg) by mouth daily 90 tablet 3     metoprolol (TOPROL-XL) 25 MG 24 hr tablet Take 25 mg by mouth daily.       nitroGLYcerin (NITROSTAT) 0.4 MG sublingual tablet Place 1 tablet (0.4 mg) under the tongue every 5 minutes as needed for chest pain 25 tablet 1     omeprazole (PRILOSEC) 20 MG DR capsule Take 20 mg by mouth daily       rosuvastatin (CRESTOR) 40 MG tablet Take 1 tablet (40 mg) by mouth daily 90 tablet 3       ALLERGIES     Allergies   Allergen Reactions     Imdur [Isosorbide] Other (See Comments)     Headache.  Patient took 30mg dose for 2 days and had HA on consecutive days.      Morphine Sulfate Nausea and Vomiting       PAST MEDICAL HISTORY:  Past Medical History:   Diagnosis Date     Asthma      Coronary artery disease     cardiac cath 2011: JUDY to LAD and 1st diagonal     Fibromuscular dysplasia (H)      GERD (gastroesophageal reflux disease)      Hyperlipidemia LDL goal <70      Hypertension      MI (myocardial infarction) (H)      Peripheral vascular disease (H)     angioplasty bilat renal arteries     Renal artery stenosis (H)     Bilateral moderate renal stenosis noted on 13 renal ultrasound     Sleep apnea     mild per sleep study [facility] 7-10-15       PAST SURGICAL HISTORY:  Past Surgical History:   Procedure Laterality Date     COLONOSCOPY N/A 2015    Procedure: COLONOSCOPY;  Surgeon: Juan Miguel Garcia MD;  Location:  GI     GYN SURGERY       HYSTERECTOMY      partial       FAMILY HISTORY:  Family History   Problem Relation Age of Onset     Heart Disease Mother      Heart Disease Sister        SOCIAL HISTORY:  Social History     Socioeconomic History     Marital status: Single     Spouse name: Not on file     Number of children: Not on file     Years of education: Not on file     Highest education level: Not on file   Occupational History     Not on file   Social Needs     Financial resource strain: Not on file     Food insecurity     Worry: Not on file     Inability: Not on file     Transportation needs     Medical: Not on file     Non-medical: Not on file   Tobacco Use     Smoking status: Former Smoker     Packs/day: 1.00     Years: 20.00     Pack years: 20.00     Start date:      Quit date:      Years since quittin.0     Smokeless tobacco: Former User     Quit date: 1995   Substance and Sexual Activity     Alcohol use: Yes     Comment: wine on rare occasions      Drug use: No     Sexual activity: Not on file   Lifestyle     Physical activity     Days per week: Not on file     Minutes per session: Not on file     Stress:  Not on file   Relationships     Social connections     Talks on phone: Not on file     Gets together: Not on file     Attends Gnosticist service: Not on file     Active member of club or organization: Not on file     Attends meetings of clubs or organizations: Not on file     Relationship status: Not on file     Intimate partner violence     Fear of current or ex partner: Not on file     Emotionally abused: Not on file     Physically abused: Not on file     Forced sexual activity: Not on file   Other Topics Concern     Parent/sibling w/ CABG, MI or angioplasty before 65F 55M? Not Asked      Service Not Asked     Blood Transfusions Not Asked     Caffeine Concern Not Asked     Occupational Exposure Not Asked     Hobby Hazards Not Asked     Sleep Concern No     Stress Concern No     Weight Concern Not Asked     Special Diet Not Asked     Back Care Not Asked     Exercise Yes     Comment: on occasion     Bike Helmet Not Asked     Seat Belt Not Asked     Self-Exams Not Asked   Social History Narrative     Not on file       Review of Systems:  Skin:          Eyes:         ENT:         Respiratory:          Cardiovascular:         Gastroenterology:        Genitourinary:         Musculoskeletal:         Neurologic:         Psychiatric:         Heme/Lymph/Imm:         Endocrine:           Physical Exam:  Vitals: There were no vitals taken for this visit.    Constitutional:           Skin:             Head:           Eyes:           Lymph:      ENT:           Neck:           Respiratory:            Cardiac:                                                           GI:           Extremities and Muscular Skeletal:                 Neurological:           Psych:             Thank you for allowing me to participate in the care of your patient.    Sincerely,     Mica Tobias MD     [facility]

## 2021-01-27 NOTE — PROGRESS NOTES
HPI and Plan:     I had the pleasure of seeing Ms. Ni in followup at the [facility] today.  She is a very pleasant 55-year-old female who I last saw in 01/2020.  She has a history of coronary artery disease and is status post angioplasty and stenting of a 95% LAD lesion in 08/2011.  She also has a history of fibromuscular dysplasia of the renal arteries and has undergone PTCA in the past.      Her last coronary angiogram was in 06/2014 at which point no significant obstructive disease was found. She has also undergone a workup for thoracic outlet syndrome which has been unrevealing.     Prior to her last office visit she had been complaining of some chest discomfort and a subsequent stress perfusion study in October 2019 was negative for ischemia.  She had also undergone a renal artery ultrasound in December 2019 that did demonstrate elevated velocities in the right renal artery.  I discussed this with Dr. Taveras, who recommended continued medical therapy given that her blood pressure was well controlled.     She presents today feeling well overall from a cardiovascular standpoint.  She specifically denies any chest discomfort since her hospitalization. She denies any dyspnea on exertion, PND, orthopnea or lower extremity edema.  She denies any syncope or presyncope.    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    Her physical exam is as dictated below.        Lipids on 1/26/2021 demonstrated total cholesterol of 148, HDL of 39, LDL of 87 and triglycerides of 111.       IMPRESSION:   1.  Coronary artery disease, status post drug-eluting stent placement to mid LAD in 2011.  Her most recent stress perfusion study from 2019 was negative for ischemia.    2.  History of renal fibromuscular dysplasia, status post stenting.  She was noted to have elevated velocities in the  right renal artery on an ultrasound from 12/2019.  Continued medical therapy was recommended.  3.  Hypertension which is well controlled.  4.  Dyslipidemia.         The patient is doing well overall from a cardiovascular standpoint.  There is no evidence of angina or congestive heart failure.     We did discuss the interval worsening in her lipid panel over the past few years and I think it be reasonable to address this with the addition of Zetia.  We will obtain a follow-up lipid panel in approximately 3 months to assess the efficacy of this intervention.     It was a pleasure seeing her in follow-up.       CURRENT MEDICATIONS:  Current Outpatient Medications   Medication Sig Dispense Refill     albuterol (PROAIR HFA/PROVENTIL HFA/VENTOLIN HFA) 108 (90 Base) MCG/ACT inhaler Inhale 1-2 puffs into the lungs every 4 hours as needed for shortness of breath / dyspnea or wheezing       amLODIPine (NORVASC) 10 MG tablet Take 1 tablet (10 mg) by mouth daily 90 tablet 1     aspirin 81 MG EC tablet Take 81 mg by mouth daily       losartan (COZAAR) 100 MG tablet Take 1 tablet (100 mg) by mouth daily 90 tablet 3     metoprolol (TOPROL-XL) 25 MG 24 hr tablet Take 25 mg by mouth daily.       nitroGLYcerin (NITROSTAT) 0.4 MG sublingual tablet Place 1 tablet (0.4 mg) under the tongue every 5 minutes as needed for chest pain 25 tablet 1     omeprazole (PRILOSEC) 20 MG DR capsule Take 20 mg by mouth daily       rosuvastatin (CRESTOR) 40 MG tablet Take 1 tablet (40 mg) by mouth daily 90 tablet 3       ALLERGIES     Allergies   Allergen Reactions     Imdur [Isosorbide] Other (See Comments)     Headache.  Patient took 30mg dose for 2 days and had HA on consecutive days.     Morphine Sulfate Nausea and Vomiting       PAST MEDICAL HISTORY:  Past Medical History:   Diagnosis Date     Asthma      Coronary artery disease     cardiac cath 2011: JUDY to LAD and 1st diagonal     Fibromuscular dysplasia (H)      GERD (gastroesophageal reflux  disease)      Hyperlipidemia LDL goal <70      Hypertension      MI (myocardial infarction) (H)      Peripheral vascular disease (H)     angioplasty bilat renal arteries     Renal artery stenosis (H)     Bilateral moderate renal stenosis noted on 13 renal ultrasound     Sleep apnea     mild per sleep study [facility] 7-10-15       PAST SURGICAL HISTORY:  Past Surgical History:   Procedure Laterality Date     COLONOSCOPY N/A 2015    Procedure: COLONOSCOPY;  Surgeon: Juan Miguel Garcia MD;  Location:  GI     GYN SURGERY       HYSTERECTOMY      partial       FAMILY HISTORY:  Family History   Problem Relation Age of Onset     Heart Disease Mother      Heart Disease Sister        SOCIAL HISTORY:  Social History     Socioeconomic History     Marital status: Single     Spouse name: Not on file     Number of children: Not on file     Years of education: Not on file     Highest education level: Not on file   Occupational History     Not on file   Social Needs     Financial resource strain: Not on file     Food insecurity     Worry: Not on file     Inability: Not on file     Transportation needs     Medical: Not on file     Non-medical: Not on file   Tobacco Use     Smoking status: Former Smoker     Packs/day: 1.00     Years: 20.00     Pack years: 20.00     Start date:      Quit date:      Years since quittin.0     Smokeless tobacco: Former User     Quit date: 1995   Substance and Sexual Activity     Alcohol use: Yes     Comment: wine on rare occasions      Drug use: No     Sexual activity: Not on file   Lifestyle     Physical activity     Days per week: Not on file     Minutes per session: Not on file     Stress: Not on file   Relationships     Social connections     Talks on phone: Not on file     Gets together: Not on file     Attends Scientologist service: Not on file     Active member of club or organization: Not on file     Attends meetings of clubs or organizations: Not on  file     Relationship status: Not on file     Intimate partner violence     Fear of current or ex partner: Not on file     Emotionally abused: Not on file     Physically abused: Not on file     Forced sexual activity: Not on file   Other Topics Concern     Parent/sibling w/ CABG, MI or angioplasty before 65F 55M? Not Asked      Service Not Asked     Blood Transfusions Not Asked     Caffeine Concern Not Asked     Occupational Exposure Not Asked     Hobby Hazards Not Asked     Sleep Concern No     Stress Concern No     Weight Concern Not Asked     Special Diet Not Asked     Back Care Not Asked     Exercise Yes     Comment: on occasion     Bike Helmet Not Asked     Seat Belt Not Asked     Self-Exams Not Asked   Social History Narrative     Not on file       Review of Systems:  Skin:          Eyes:         ENT:         Respiratory:          Cardiovascular:         Gastroenterology:        Genitourinary:         Musculoskeletal:         Neurologic:         Psychiatric:         Heme/Lymph/Imm:         Endocrine:           Physical Exam:  Vitals: There were no vitals taken for this visit.    Constitutional:           Skin:             Head:           Eyes:           Lymph:      ENT:           Neck:           Respiratory:            Cardiac:                                                           GI:           Extremities and Muscular Skeletal:                 Neurological:           Psych:           CC  No referring provider defined for this encounter.

## 2021-06-06 ENCOUNTER — HOSPITAL ENCOUNTER (EMERGENCY)
Facility: CLINIC | Age: 56
Discharge: HOME | End: 2021-06-06
Attending: EMERGENCY MEDICINE | Admitting: EMERGENCY MEDICINE
Payer: COMMERCIAL

## 2021-06-06 ENCOUNTER — APPOINTMENT (OUTPATIENT)
Dept: GENERAL RADIOLOGY | Facility: CLINIC | Age: 56
End: 2021-06-06
Attending: EMERGENCY MEDICINE
Payer: COMMERCIAL

## 2021-06-06 VITALS
SYSTOLIC BLOOD PRESSURE: 173 MMHG | TEMPERATURE: 97.6 F | RESPIRATION RATE: 16 BRPM | HEIGHT: 64 IN | DIASTOLIC BLOOD PRESSURE: 96 MMHG | OXYGEN SATURATION: 100 % | HEART RATE: 74 BPM | WEIGHT: 220 LBS | BODY MASS INDEX: 37.56 KG/M2

## 2021-06-06 DIAGNOSIS — R10.9 FLANK PAIN: ICD-10-CM

## 2021-06-06 DIAGNOSIS — M54.9 RIGHT-SIDED BACK PAIN, UNSPECIFIED BACK LOCATION, UNSPECIFIED CHRONICITY: ICD-10-CM

## 2021-06-06 LAB
ALBUMIN SERPL-MCNC: 3.9 G/DL (ref 3.4–5)
ALBUMIN UR-MCNC: NEGATIVE MG/DL
ALP SERPL-CCNC: 77 U/L (ref 40–150)
ALT SERPL W P-5'-P-CCNC: 25 U/L (ref 0–50)
ANION GAP SERPL CALCULATED.3IONS-SCNC: 3 MMOL/L (ref 3–14)
APPEARANCE UR: CLEAR
AST SERPL W P-5'-P-CCNC: 21 U/L (ref 0–45)
BASOPHILS # BLD AUTO: 0 10E9/L (ref 0–0.2)
BASOPHILS NFR BLD AUTO: 0.5 %
BILIRUB SERPL-MCNC: 0.2 MG/DL (ref 0.2–1.3)
BILIRUB UR QL STRIP: NEGATIVE
BUN SERPL-MCNC: 14 MG/DL (ref 7–30)
CALCIUM SERPL-MCNC: 9.3 MG/DL (ref 8.5–10.1)
CHLORIDE SERPL-SCNC: 108 MMOL/L (ref 94–109)
CO2 SERPL-SCNC: 31 MMOL/L (ref 20–32)
COLOR UR AUTO: ABNORMAL
CREAT SERPL-MCNC: 0.89 MG/DL (ref 0.52–1.04)
D DIMER PPP FEU-MCNC: 0.5 UG/ML FEU (ref 0–0.5)
DIFFERENTIAL METHOD BLD: ABNORMAL
EOSINOPHIL # BLD AUTO: 0.6 10E9/L (ref 0–0.7)
EOSINOPHIL NFR BLD AUTO: 7.7 %
ERYTHROCYTE [DISTWIDTH] IN BLOOD BY AUTOMATED COUNT: 13.7 % (ref 10–15)
GFR SERPL CREATININE-BSD FRML MDRD: 72 ML/MIN/{1.73_M2}
GLUCOSE SERPL-MCNC: 86 MG/DL (ref 70–99)
GLUCOSE UR STRIP-MCNC: NEGATIVE MG/DL
HCT VFR BLD AUTO: 38.5 % (ref 35–47)
HGB BLD-MCNC: 12.1 G/DL (ref 11.7–15.7)
HGB UR QL STRIP: ABNORMAL
IMM GRANULOCYTES # BLD: 0 10E9/L (ref 0–0.4)
IMM GRANULOCYTES NFR BLD: 0.3 %
INTERPRETATION ECG - MUSE: NORMAL
KETONES UR STRIP-MCNC: NEGATIVE MG/DL
LEUKOCYTE ESTERASE UR QL STRIP: NEGATIVE
LYMPHOCYTES # BLD AUTO: 2.7 10E9/L (ref 0.8–5.3)
LYMPHOCYTES NFR BLD AUTO: 36.9 %
MCH RBC QN AUTO: 26.7 PG (ref 26.5–33)
MCHC RBC AUTO-ENTMCNC: 31.4 G/DL (ref 31.5–36.5)
MCV RBC AUTO: 85 FL (ref 78–100)
MONOCYTES # BLD AUTO: 0.5 10E9/L (ref 0–1.3)
MONOCYTES NFR BLD AUTO: 6.8 %
MUCOUS THREADS #/AREA URNS LPF: PRESENT /LPF
NEUTROPHILS # BLD AUTO: 3.5 10E9/L (ref 1.6–8.3)
NEUTROPHILS NFR BLD AUTO: 47.8 %
NITRATE UR QL: NEGATIVE
NRBC # BLD AUTO: 0 10*3/UL
NRBC BLD AUTO-RTO: 0 /100
PH UR STRIP: 6 PH (ref 5–7)
PLATELET # BLD AUTO: 295 10E9/L (ref 150–450)
POTASSIUM SERPL-SCNC: 4 MMOL/L (ref 3.4–5.3)
PROT SERPL-MCNC: 7.7 G/DL (ref 6.8–8.8)
RBC # BLD AUTO: 4.53 10E12/L (ref 3.8–5.2)
RBC #/AREA URNS AUTO: 9 /HPF (ref 0–2)
SODIUM SERPL-SCNC: 142 MMOL/L (ref 133–144)
SOURCE: ABNORMAL
SP GR UR STRIP: 1.02 (ref 1–1.03)
SQUAMOUS #/AREA URNS AUTO: 1 /HPF (ref 0–1)
TROPONIN I SERPL-MCNC: <0.015 UG/L (ref 0–0.04)
UROBILINOGEN UR STRIP-MCNC: 0 MG/DL (ref 0–2)
WBC # BLD AUTO: 7.4 10E9/L (ref 4–11)
WBC #/AREA URNS AUTO: 1 /HPF (ref 0–5)

## 2021-06-06 PROCEDURE — 85025 COMPLETE CBC W/AUTO DIFF WBC: CPT | Performed by: EMERGENCY MEDICINE

## 2021-06-06 PROCEDURE — 84484 ASSAY OF TROPONIN QUANT: CPT | Performed by: EMERGENCY MEDICINE

## 2021-06-06 PROCEDURE — 81001 URINALYSIS AUTO W/SCOPE: CPT | Performed by: EMERGENCY MEDICINE

## 2021-06-06 PROCEDURE — 99285 EMERGENCY DEPT VISIT HI MDM: CPT | Mod: 25

## 2021-06-06 PROCEDURE — 85379 FIBRIN DEGRADATION QUANT: CPT | Performed by: EMERGENCY MEDICINE

## 2021-06-06 PROCEDURE — 250N000011 HC RX IP 250 OP 636: Performed by: EMERGENCY MEDICINE

## 2021-06-06 PROCEDURE — 80053 COMPREHEN METABOLIC PANEL: CPT | Performed by: EMERGENCY MEDICINE

## 2021-06-06 PROCEDURE — 250N000013 HC RX MED GY IP 250 OP 250 PS 637: Performed by: EMERGENCY MEDICINE

## 2021-06-06 PROCEDURE — 96374 THER/PROPH/DIAG INJ IV PUSH: CPT

## 2021-06-06 PROCEDURE — 71046 X-RAY EXAM CHEST 2 VIEWS: CPT

## 2021-06-06 PROCEDURE — 93005 ELECTROCARDIOGRAM TRACING: CPT

## 2021-06-06 RX ORDER — CYCLOBENZAPRINE HCL 10 MG
10 TABLET ORAL 3 TIMES DAILY PRN
Qty: 20 TABLET | Refills: 0 | Status: SHIPPED | OUTPATIENT
Start: 2021-06-06 | End: 2021-06-12

## 2021-06-06 RX ORDER — ONDANSETRON 4 MG/1
4 TABLET, ORALLY DISINTEGRATING ORAL ONCE
Status: COMPLETED | OUTPATIENT
Start: 2021-06-06 | End: 2021-06-06

## 2021-06-06 RX ORDER — HYDROCODONE BITARTRATE AND ACETAMINOPHEN 5; 325 MG/1; MG/1
2 TABLET ORAL ONCE
Status: COMPLETED | OUTPATIENT
Start: 2021-06-06 | End: 2021-06-06

## 2021-06-06 RX ORDER — KETOROLAC TROMETHAMINE 15 MG/ML
15 INJECTION, SOLUTION INTRAMUSCULAR; INTRAVENOUS ONCE
Status: COMPLETED | OUTPATIENT
Start: 2021-06-06 | End: 2021-06-06

## 2021-06-06 RX ADMIN — KETOROLAC TROMETHAMINE 15 MG: 15 INJECTION, SOLUTION INTRAMUSCULAR; INTRAVENOUS at 13:43

## 2021-06-06 RX ADMIN — ONDANSETRON 4 MG: 4 TABLET, ORALLY DISINTEGRATING ORAL at 15:22

## 2021-06-06 RX ADMIN — HYDROCODONE BITARTRATE AND ACETAMINOPHEN 2 TABLET: 5; 325 TABLET ORAL at 15:23

## 2021-06-06 ASSESSMENT — MIFFLIN-ST. JEOR: SCORE: 1572.91

## 2021-06-06 ASSESSMENT — ENCOUNTER SYMPTOMS
SHORTNESS OF BREATH: 0
FEVER: 0
BACK PAIN: 1
COUGH: 1
FLANK PAIN: 1

## 2021-06-06 NOTE — DISCHARGE INSTRUCTIONS
As we discussed, your chest and back pain work-up was negative.  Please come back to the ER immediately with any other concerns, specifically if you have any activities that you can normally do, you can no longer do.  Please take the muscle relaxer as we discussed.  Please be absolutely certain to follow with your regular doctor next week as we discussed.  Come back with any other concerns or questions, or any new symptoms of any kind.

## 2021-06-06 NOTE — ED TRIAGE NOTES
Right lower back pain for last 4 weeks, seen PMD and was referred to PT, 2 days ago pain wrapping around right flank area into right upper abdomen since bending over to  something. Pain gets worse at night. Ambulatory in Triage. Coughing since yesterday

## 2021-06-06 NOTE — ED PROVIDER NOTES
"  History   Chief Complaint:  Back Pain, Flank Pain, and Cough     The history is provided by the patient.      Glenis Ni is a 55 year old female with history of CAD, MI, GERD, hypertension, hyperlipidemia who presents with back and flank pain. The patient tells us that for four weeks she has been having right sided flank pain. She tells us that she was putting her Ipad down on the floor the pain began to radiate across her chest. In addition, she tells us that when she takes a deep breath or looks over her left shoulder the pain is much worse. She notes having a heart attack in 2012 and these symptoms have not felt similar. The patient denies recent travel, leg swelling, shortness of breath or fever.     Review of Systems   Constitutional: Negative for fever.   Respiratory: Positive for cough. Negative for shortness of breath.    Cardiovascular: Negative for leg swelling.   Genitourinary: Positive for flank pain.   Musculoskeletal: Positive for back pain.   All other systems reviewed and are negative.    Allergies:  Imdur   Morphine sulfate    Medications:  Albuterol   Norvasc  Aspirin 81 mg  Zetia   Cozaar  Metoprolol  Nitrostat  Prilosec  Crestor    Past Medical History:    Asthma  CAD  Fibromuscular dysplasia   GERD  Hyperlipidemia   Hypertension   MI  Peripheral vascular disease  Renal artery stenosis  Sleep apnea  Obesity   Unstable angina     Past Surgical History:    Colonoscopy  GYN surgery  Partial hysterectomy     Family History:    Asthma  Hypertension   Breast Cancer    Social History:  The patient presents a female acquaintance    Physical Exam     Patient Vitals for the past 24 hrs:   BP Temp Temp src Pulse Resp SpO2 Height Weight   06/06/21 1158 (!) 166/82 97.6  F (36.4  C) Temporal 74 16 100 % 1.626 m (5' 4\") 99.8 kg (220 lb)       Physical Exam  Vitals: reviewed by me  General: Pt seen on [facility], pleasant, cooperative, and alert to conversation  Eyes: Tracking well, clear " conjunctiva BL  ENT: MMM, midline trachea.   Lungs: No tachypnea, no accessory muscle use. No respiratory distress.   CV: Rate as above  Abd: Soft, non tender, no guarding, no rebound. Non distended  MSK: no joint effusion.  No evidence of trauma.  Does have some pain when rotating her trunk to the left, feels pain along the right paraspinal muscles.  Skin: No rash  Neuro: Clear speech and no facial droop.  Psych: Not RIS, no e/o AH/VH      Emergency Department Course   ECG  ECG taken at 1329, ECG read at 1330  Normal sinus rhythm. Right bundle branch block. Abnormal ECG.    No significant change was found as compared to prior, dated 21/10/2019.  Rate 63 bpm. VT interval 200 ms. QRS duration 114 ms. QT/QTc 422/431 ms. P-R-T axes 56 29 -11.     Imaging:    XR Chest 2 Views:  Chest is negative and unchanged. Lungs clear. No pleural  effusions. Reading per radiology.    Laboratory:     CBC: WBC 7.4, HGB 12.1,    CMP:  WNL (Creatinine 0.89)   UA with microscopic: Blood Moderate; RBC 9(H); Mucous Present o/w WNL   D Dimer - 0.5  Troponin (Collected 1320): <0.015    Emergency Department Course:    Reviewed:  I reviewed nursing notes, vitals, past medical history and care everywhere    Assessments:  1412 I obtained history and examined the patient as noted above.   1533 I rechecked the patient and explained findings.     Interventions:  1343 Toradol 15 mg IV  1522 Zofran 4 mg PO  1523 Norco 5- 325 mg PO x 2     Disposition:  The patient was discharged to home.     Impression & Plan     Medical Decision Making:  Glenis Ni is 55 year old male who presents to the ER with what appears to be right back and flank pain for four weeks, slightly worse over the last three days ever since she set an item down on the floor. There is a clear mechanical component to this, specifically when she turns to the left. This may be some type of radicular pain, or musculoskeletal. Thankfully after several days of pain, her  troponin is negative, as is her D dimer. Her chest Xray is within normal limits, and her skin exam is normal as well. She is asking for a muscle relaxer as that has helped her with the same pain in the past before, and I do think that this is reasonable. Will plan for discharge home with PCP follow up as again no clear cause of her pain was found today, and she herself feels comfortable with this plan. Female acquaintance at bedside also seems to be OK with this plan. Will discharge as above, all questions were answered.     Diagnosis:    ICD-10-CM    1. Right-sided back pain, unspecified back location, unspecified chronicity  M54.9    2. Flank pain  R10.9        Discharge Medications:  Discharge Medication List as of 6/6/2021  3:44 PM      START taking these medications    Details   cyclobenzaprine (FLEXERIL) 10 MG tablet Take 1 tablet (10 mg) by mouth 3 times daily as needed for muscle spasms, Disp-20 tablet, R-0, Local Print             Scribe Disclosure:  I, Carl Hu, am serving as a scribe at 2:11 PM on 6/6/2021 to document services personally performed by Handy Muñoz MD, based on my observations and the provider's statements to me.            Handy Muñoz MD  06/06/21 2137       Handy Muñoz MD  06/06/21 2137

## 2022-01-27 DIAGNOSIS — E78.2 MIXED HYPERLIPIDEMIA: ICD-10-CM

## 2022-01-27 DIAGNOSIS — I25.10 CORONARY ARTERY DISEASE INVOLVING NATIVE CORONARY ARTERY OF NATIVE HEART WITHOUT ANGINA PECTORIS: Primary | ICD-10-CM

## 2022-01-27 DIAGNOSIS — I10 BENIGN ESSENTIAL HYPERTENSION: ICD-10-CM

## 2022-01-31 ENCOUNTER — LAB (OUTPATIENT)
Dept: LAB | Facility: CLINIC | Age: 57
End: 2022-01-31
Payer: COMMERCIAL

## 2022-01-31 DIAGNOSIS — I25.10 CORONARY ARTERY DISEASE INVOLVING NATIVE CORONARY ARTERY OF NATIVE HEART WITHOUT ANGINA PECTORIS: ICD-10-CM

## 2022-01-31 DIAGNOSIS — I10 BENIGN ESSENTIAL HYPERTENSION: ICD-10-CM

## 2022-01-31 DIAGNOSIS — E78.2 MIXED HYPERLIPIDEMIA: ICD-10-CM

## 2022-01-31 LAB
ALT SERPL W P-5'-P-CCNC: 23 U/L (ref 0–50)
ANION GAP SERPL CALCULATED.3IONS-SCNC: 5 MMOL/L (ref 3–14)
BUN SERPL-MCNC: 11 MG/DL (ref 7–30)
CALCIUM SERPL-MCNC: 8.8 MG/DL (ref 8.5–10.1)
CHLORIDE BLD-SCNC: 109 MMOL/L (ref 94–109)
CHOLEST SERPL-MCNC: 141 MG/DL
CO2 SERPL-SCNC: 26 MMOL/L (ref 20–32)
CREAT SERPL-MCNC: 0.82 MG/DL (ref 0.52–1.04)
FASTING STATUS PATIENT QL REPORTED: YES
GFR SERPL CREATININE-BSD FRML MDRD: 83 ML/MIN/1.73M2
GLUCOSE BLD-MCNC: 90 MG/DL (ref 70–99)
HDLC SERPL-MCNC: 38 MG/DL
LDLC SERPL CALC-MCNC: 76 MG/DL
NONHDLC SERPL-MCNC: 103 MG/DL
POTASSIUM BLD-SCNC: 3.3 MMOL/L (ref 3.4–5.3)
SODIUM SERPL-SCNC: 140 MMOL/L (ref 133–144)
TRIGL SERPL-MCNC: 135 MG/DL

## 2022-01-31 PROCEDURE — 84460 ALANINE AMINO (ALT) (SGPT): CPT | Performed by: INTERNAL MEDICINE

## 2022-01-31 PROCEDURE — 36415 COLL VENOUS BLD VENIPUNCTURE: CPT | Performed by: INTERNAL MEDICINE

## 2022-01-31 PROCEDURE — 80048 BASIC METABOLIC PNL TOTAL CA: CPT | Performed by: INTERNAL MEDICINE

## 2022-01-31 PROCEDURE — 80061 LIPID PANEL: CPT | Performed by: INTERNAL MEDICINE

## 2022-02-07 ENCOUNTER — OFFICE VISIT (OUTPATIENT)
Dept: CARDIOLOGY | Facility: CLINIC | Age: 57
End: 2022-02-07
Payer: COMMERCIAL

## 2022-02-07 VITALS
BODY MASS INDEX: 37.01 KG/M2 | HEIGHT: 64 IN | WEIGHT: 216.8 LBS | SYSTOLIC BLOOD PRESSURE: 128 MMHG | DIASTOLIC BLOOD PRESSURE: 80 MMHG | HEART RATE: 78 BPM | OXYGEN SATURATION: 98 %

## 2022-02-07 DIAGNOSIS — I25.10 CORONARY ARTERY DISEASE INVOLVING NATIVE CORONARY ARTERY OF NATIVE HEART WITHOUT ANGINA PECTORIS: Primary | ICD-10-CM

## 2022-02-07 DIAGNOSIS — I70.1 RENAL ARTERY STENOSIS (H): ICD-10-CM

## 2022-02-07 DIAGNOSIS — I77.3 FIBROMUSCULAR DYSPLASIA (H): ICD-10-CM

## 2022-02-07 DIAGNOSIS — I10 BENIGN ESSENTIAL HYPERTENSION: ICD-10-CM

## 2022-02-07 DIAGNOSIS — E78.2 MIXED HYPERLIPIDEMIA: ICD-10-CM

## 2022-02-07 DIAGNOSIS — I25.110 CORONARY ARTERY DISEASE INVOLVING NATIVE CORONARY ARTERY OF NATIVE HEART WITH UNSTABLE ANGINA PECTORIS (H): ICD-10-CM

## 2022-02-07 DIAGNOSIS — E66.01 MORBID OBESITY (H): ICD-10-CM

## 2022-02-07 PROCEDURE — 99214 OFFICE O/P EST MOD 30 MIN: CPT | Performed by: NURSE PRACTITIONER

## 2022-02-07 RX ORDER — METOPROLOL SUCCINATE 25 MG/1
25 TABLET, EXTENDED RELEASE ORAL DAILY
Qty: 90 TABLET | Refills: 3 | Status: SHIPPED | OUTPATIENT
Start: 2022-02-07 | End: 2023-03-27

## 2022-02-07 RX ORDER — AMLODIPINE BESYLATE 10 MG/1
10 TABLET ORAL DAILY
Qty: 90 TABLET | Refills: 1 | Status: SHIPPED | OUTPATIENT
Start: 2022-02-07 | End: 2023-03-27

## 2022-02-07 RX ORDER — ASPIRIN 81 MG/1
81 TABLET ORAL DAILY
Qty: 90 TABLET | Refills: 3 | Status: SHIPPED | OUTPATIENT
Start: 2022-02-07 | End: 2023-03-27

## 2022-02-07 RX ORDER — EZETIMIBE 10 MG/1
10 TABLET ORAL DAILY
Qty: 90 TABLET | Refills: 3 | Status: SHIPPED | OUTPATIENT
Start: 2022-02-07 | End: 2023-03-27

## 2022-02-07 RX ORDER — LOSARTAN POTASSIUM 100 MG/1
100 TABLET ORAL DAILY
Qty: 90 TABLET | Refills: 3 | Status: SHIPPED | OUTPATIENT
Start: 2022-02-07 | End: 2023-03-27

## 2022-02-07 RX ORDER — ROSUVASTATIN CALCIUM 40 MG/1
40 TABLET, COATED ORAL DAILY
Qty: 90 TABLET | Refills: 3 | Status: SHIPPED | OUTPATIENT
Start: 2022-02-07 | End: 2023-03-27

## 2022-02-07 ASSESSMENT — MIFFLIN-ST. JEOR: SCORE: 1553.4

## 2022-02-07 NOTE — PATIENT INSTRUCTIONS
Thank you for your visit with the [facility] today.    Today's plan:   1. Continue with your current medications for now.  2. Continue to try to get regular exercise and stick to a heart healthy diet.  3. Check your blood pressure periodically at home. We're shooting for mostly under 130/85.  4. Follow up with Dr. Tobias around this time next year for an annual check in with labs beforehand.    Results:   Your labs from last week look good overall. Your potassium level is on the low end of normal. Try to get more potassium in your diet from fresh fruits and vegetables.   Component      Latest Ref Rng & Units 1/31/2022   Sodium      133 - 144 mmol/L 140   Potassium      3.4 - 5.3 mmol/L 3.3 (L)   Chloride      94 - 109 mmol/L 109   Carbon Dioxide      20 - 32 mmol/L 26   Anion Gap      3 - 14 mmol/L 5   Urea Nitrogen      7 - 30 mg/dL 11   Creatinine      0.52 - 1.04 mg/dL 0.82   Calcium      8.5 - 10.1 mg/dL 8.8   Glucose      70 - 99 mg/dL 90   GFR Estimate      >60 mL/min/1.73m2 83   Cholesterol      <200 mg/dL 141   Triglycerides      <150 mg/dL 135   HDL Cholesterol      >=50 mg/dL 38 (L)   LDL Cholesterol Calculated      <=100 mg/dL 76   Non HDL Cholesterol      <130 mg/dL 103   Patient Fasting > 8hrs?       Yes   ALT      0 - 50 U/L 23       If you have questions or concerns, please do not hesitate to call my nurse team at [phone].     Scheduling phone number: [phone]    It was a pleasure seeing you today!     JOHNNY Luna, CNP  Nurse Practitioner  [facility]  February 7, 2022  ________________________________________________________    Patient Education     Mediterranean Diet  A heart healthy eating plan  The Mediterranean Diet is based on the eating habits of people in countries near the [place]. People living in this part of the world have long lives and low rates of chronic diseases. They have lower rates of death from heart disease, cancer  and other illnesses.  When you follow this eating plan you'll have better control of your blood sugar and weight. The plan requires simple changes in your habits of eating, and the whole family can take part.  Mediterranean lifestyle   Enjoy eating with others. Sit at the table with family and friends and enjoy your meal. When you eat slowly, you are able to tune in to your body's hunger and fullness signals. You're less likely to overeat.  Be physically active: Being active every day is important for overall good health. Run, walk, dance and do lighter activities such as house and yard work. Move more and sit less!  Drink plenty of water during the day.  Drink red wine with meals in modest amounts (optional).  The Mediterranean Pyramid   The pyramid shows the food groups and the amounts eaten in relation to the whole diet. It is more than a diet; it is also a life-style plan.  The largest food group at bottom contains plant foods: vegetables, fruits, grains, nuts, legumes, seeds, olives and olive oil. These foods make up the largest part of your meals.   The next groups above: fish and seafood, then poultry, cheese, eggs and yogurt are eaten less often and in smaller servings.   The top group, meats and sweets, are eaten the least often and in the smallest amounts.    Tips for adding plant foods to your meals   Vegetables and fruits:     Aim for 3 to 8 servings each day. A serving is   to 2 cups, depending on the food.    Choose a variety of colors. Big green salads are a great way to include several vegetable servings.    Try fresh fruit as a dessert: oranges, grapes, apples pomegranates or fresh figs.    At home keep fresh fruit in a bowl to tempt family.    Bring fruit and vegetables to work for a snack.  Oil     Replace butter and margarine with healthy fats such as olive oil. Other plant-based oils are canola, walnut and peanut oil. These are high in good fats. Use them in cooking, salad dressings and  "baking.    Drizzle your bread with olive oil instead of butter or margarine. Use herbs and spices to add flavor and aroma and to reduce fat and salt when cooking.  Whole grains    Look for the term \"whole\" or \"whole grain\" on the package. Processing grains removes vitamins, minerals and fiber. Whole grains may include: corn, wheat, oats, rye, rice and barley.    Examples of Mediterranean grains include: barley, farro, buckwheat, bulgur, couscous, and wheatberries.    Slowly switch to a whole grain by using whole-grain blends of pastas and rice. Or mix whole grains with refined; for example, mix whole-wheat pasta with white pasta.  Beans and legumes     Beans are a good source of protein and fiber, adding flavor and texture to dishes. Examples include cannellini beans, chickpea, kayla beans, green beans, kidney beans, lentils and split peas.    Cook a vegetarian meal one night a week: use beans or legumes with vegetables and grains.    Nuts are high in healthy fats. Try walnuts, almonds, pine nuts, hazelnuts and cashews. Avoid candied, honey-roasted and heavily salted nuts.    Limit your intake of nuts to a small handful each day. Explore ways to add to nuts to salads and other dishes.  Tips for using fish and seafood in your meals    Aim for meals with fish or shellfish at least twice a week.    Tuna, herring, salmon and sardines are rich in heart-healthy omega-3. Shellfish, such as mussels, oysters and clams, have similar benefits for brain and heart health.  Tips for using poultry, eggs and cheese and yogurt in your meals    Eat poultry or eggs at least twice a week.    Roast, broil or grill your poultry. Season with fresh or dried herbs.    Enjoy low-fat cheese or yogurt every day.  Tips for using red meat and sweets in your meals     Limit lean red meat to one time a week or 4 times a month.    Red meat has more saturated fats. Choose a lean cut, like top loin, sirloin, flank steak, strip steak or 90% lean ground " beef.    Limit portions to 3 to 4 ounces.    Make whole grains and vegetables the main focus of a meal. Add meat in small amounts for flavor.    Limit sweets such as ice cream or cookies for a special times or holidays.  Snacks    Snack on a handful of almonds, walnuts or sunflower seeds in place of chips, cookies or other processed snack foods.    Calcium-rich, low-fat cheese or low- and nonfat plain yogurt with fresh fruit are healthy snacks that are easy to take with you.  Like to know more?  For tips on shoppping, cooking and eating well the Mediterranean way, go to the [employer] website at www.[employer].org.  For informational purposes only. Not to replace the advice of your health care provider.   Copyright   2014 [facility]. All rights reserved.   Mediterranean pyramrid used with permission of the [employer]. [employer] [id number] - 09/15.

## 2022-02-07 NOTE — LETTER
"2/7/2022    Charisse Vishal   [facility] [address] [place] [address]    RE: Glenis Ni       Dear Colleague,     I had the pleasure of seeing Glenis Ni in the [facility].    Cardiology Clinic Progress Note    Service Date: February 7, 2022    Primary Cardiologist: Dr. Tobias      Reason for Visit: Annual follow up    HPI:   I had the pleasure of neeting Ms. Glenis Ni in the clinic today. She is a very pleasant 56 year old female with a past medical history notable for hypertension, dyslipidemia, fibromuscular dysplasia of the renal arteries, s/p PTCA, coronary artery disease, status post angioplasty and stenting of a 95% LAD lesion in 08/2011.     Most recent ischemic evaluation was with an exercise nuclear stress test in the setting of some atypical chest discomfort in October 2019 which was negative for ischemia. Left ventricular systolic function was normal with an LVEF of 59%. She had also undergone a renal artery ultrasound in December 2019 that did demonstrate elevated velocities in the right renal artery. Findings were discussed with Dr. Taveras who recommended continued medical therapy given that her blood pressure was well controlled.    The patient was last seen in the clinic by Dr. Tobias in January 2021. She was doing well at that time without concerns from a cardiac standpoint.     Today, Ms. Ni presents to the clinic for a routine annual follow up visit with cardiology. She tells me that she has been feeling generally well over the past year. She continues to exercise regularly and feels that she has been tolerating her usual activity well without exertional symptoms. 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 She otherwise denies symptoms of palpitations, dizziness, shortness of breath, dyspnea on exertion, or lower extremity edema. She has not been checking her blood pressure regularly at home as she has a wrist cuff and is unsure how accurate it is.    Labs were checked on 01/31/21 with a fasting lipid profile showing a total cholesterol of 141, HDL mildly low at 38, LDL at 76, and triglycerides at 135. She is on maximum dose rosuvastatin 40 mg daily with zetia 10 mg once daily. A basic metabolic panel was also completed on 01/31/21 showing borderline low potassium at 3.3 and normal renal function.     ASSESSMENT AND PLAN:  1. Coronary artery disease, status post PCI with JUDY x1 to mid LAD in 2011  - Stable without anginal symptoms.  - Will continue current regimen with aspirin 81 mg daily, amlodipine 10 mg daily, metoprolol XL 25 mg daily, high intensity statin, and zetia.    2. Hypertension  - Adequately controlled. Will continue current regimen with losartan 100 mg daily, metoprolol XL 25 mg daily, and amlodipine 10 mg once daily.    3. Dyslipidemia  - Treated on rosuvastatin 40 mg daily with zetia 10 mg daily with recent fasting lipid panel with results as noted above. Will continue her current regimen and reviewed trying to get regular exercise and stick to a heart healthy Mediterranean style diet to help with cholesterol management and trying to push her LDL cholesterol slightly lower to under 70.     4. Fibromuscular dysplasia of the renal arteries s/p PCTA   - Renal artery ultrasound in December 2019 showed elevated velocities in the right renal artery. Findings were discussed with Dr. Taveras who recommended continued medical therapy given that her blood pressure was well controlled.    Thank you for the opportunity to participate in this pleasant patient's care. I will plan to have her see Dr. Tobias around this time next  year for a routine annual  follow up visit with a repeat fasting lipid profile beforehand. We would be happy to see her sooner if needed for any concerns in the meantime.     35 total minutes was spent today including chart review, precharting, history and exam, post visit documentation, and reviewing studies as outlined above.     JOHNNY Luna, CNP   Nurse Practitioner  [facility]  Pager: [phone]  Text Page  (8am - 5pm, M-F)    Orders this Visit:  Orders Placed This Encounter   Procedures     Follow-Up with Cardiology     Orders Placed This Encounter   Medications     rosuvastatin (CRESTOR) 40 MG tablet     Sig: Take 1 tablet (40 mg) by mouth daily     Dispense:  90 tablet     Refill:  3     losartan (COZAAR) 100 MG tablet     Sig: Take 1 tablet (100 mg) by mouth daily     Dispense:  90 tablet     Refill:  3     ezetimibe (ZETIA) 10 MG tablet     Sig: Take 1 tablet (10 mg) by mouth daily     Dispense:  90 tablet     Refill:  3     amLODIPine (NORVASC) 10 MG tablet     Sig: Take 1 tablet (10 mg) by mouth daily     Dispense:  90 tablet     Refill:  1     metoprolol succinate ER (TOPROL-XL) 25 MG 24 hr tablet     Sig: Take 1 tablet (25 mg) by mouth daily     Dispense:  90 tablet     Refill:  3     aspirin 81 MG EC tablet     Sig: Take 1 tablet (81 mg) by mouth daily     Dispense:  90 tablet     Refill:  3     Medications Discontinued During This Encounter   Medication Reason     metoprolol (TOPROL-XL) 25 MG 24 hr tablet Reorder     rosuvastatin (CRESTOR) 40 MG tablet Reorder     losartan (COZAAR) 100 MG tablet Reorder     amLODIPine (NORVASC) 10 MG tablet Reorder     aspirin 81 MG EC tablet Reorder     ezetimibe (ZETIA) 10 MG tablet Reorder     Encounter Diagnoses   Name Primary?     Coronary artery disease involving native coronary artery of native heart without angina pectoris Yes     Benign essential hypertension      Mixed hyperlipidemia      Morbid obesity (H)      Fibromuscular  dysplasia (H)      Renal artery stenosis (H)      Coronary artery disease involving native coronary artery of native heart with unstable angina pectoris (H)      CURRENT MEDICATIONS:  Current Outpatient Medications   Medication Sig Dispense Refill     albuterol (PROAIR HFA/PROVENTIL HFA/VENTOLIN HFA) 108 (90 Base) MCG/ACT inhaler Inhale 1-2 puffs into the lungs every 4 hours as needed for shortness of breath / dyspnea or wheezing       amLODIPine (NORVASC) 10 MG tablet Take 1 tablet (10 mg) by mouth daily 90 tablet 1     aspirin 81 MG EC tablet Take 1 tablet (81 mg) by mouth daily 90 tablet 3     ezetimibe (ZETIA) 10 MG tablet Take 1 tablet (10 mg) by mouth daily 90 tablet 3     losartan (COZAAR) 100 MG tablet Take 1 tablet (100 mg) by mouth daily 90 tablet 3     metoprolol succinate ER (TOPROL-XL) 25 MG 24 hr tablet Take 1 tablet (25 mg) by mouth daily 90 tablet 3     nitroGLYcerin (NITROSTAT) 0.4 MG sublingual tablet Place 1 tablet (0.4 mg) under the tongue every 5 minutes as needed for chest pain 25 tablet 1     omeprazole (PRILOSEC) 20 MG DR capsule Take 20 mg by mouth daily       rosuvastatin (CRESTOR) 40 MG tablet Take 1 tablet (40 mg) by mouth daily 90 tablet 3     ALLERGIES  Allergies   Allergen Reactions     Imdur [Isosorbide] Other (See Comments)     Headache.  Patient took 30mg dose for 2 days and had HA on consecutive days.     Morphine Sulfate Nausea and Vomiting     PAST MEDICAL, SURGICAL, FAMILY HISTORY:  History was reviewed and updated as needed, see medical record.    SOCIAL HISTORY:  Social History     Socioeconomic History     Marital status: Single     Spouse name: Not on file     Number of children: Not on file     Years of education: Not on file     Highest education level: Not on file   Occupational History     Not on file   Tobacco Use     Smoking status: Former Smoker     Packs/day: 1.00     Years: 20.00     Pack years: 20.00     Types: Cigarettes     Start date: 1970     Quit date: 1990     " Years since quittin.1     Smokeless tobacco: Former User     Quit date: 1995   Substance and Sexual Activity     Alcohol use: Yes     Comment: wine on rare occasions      Drug use: No     Sexual activity: Not on file   Other Topics Concern     Parent/sibling w/ CABG, MI or angioplasty before 65F 55M? Not Asked      Service Not Asked     Blood Transfusions Not Asked     Caffeine Concern Not Asked     Occupational Exposure Not Asked     Hobby Hazards Not Asked     Sleep Concern No     Stress Concern No     Weight Concern Not Asked     Special Diet Not Asked     Back Care Not Asked     Exercise Yes     Comment: on occasion     Bike Helmet Not Asked     Seat Belt Not Asked     Self-Exams Not Asked   Social History Narrative     Not on file     Social Determinants of Health     Financial Resource Strain: Not on file   Food Insecurity: Not on file   Transportation Needs: Not on file   Physical Activity: Not on file   Stress: Not on file   Social Connections: Not on file   Intimate Partner Violence: Not on file   Housing Stability: Not on file     Review of Systems:  Skin:  Positive for lumps or bumps   Eyes:  Positive for glasses;contacts  ENT:  Negative    Respiratory:  Negative    Cardiovascular:    Positive for;heaviness  Gastroenterology: Positive for heartburn;nausea  Genitourinary:  Negative    Musculoskeletal:  Positive for joint pain  Neurologic:  Positive for numbness or tingling of hands  Psychiatric:  Negative    Heme/Lymph/Imm:  Positive for allergies  Endocrine:  Positive for hot flashes;night sweats     Physical Exam:  Vitals: /80 (BP Location: Right arm, Patient Position: Sitting)   Pulse 78   Ht 1.626 m (5' 4\")   Wt 98.3 kg (216 lb 12.8 oz)   SpO2 98%   BMI 37.21 kg/m     Wt Readings from Last 4 Encounters:   22 98.3 kg (216 lb 12.8 oz)   21 99.8 kg (220 lb)   21 96.6 kg (213 lb)   20 98 kg (216 lb)     CONSTITUTIONAL: Appears her stated age, well " nourished, and in no acute distress.  HEENT: Pupils equal, round. Sclerae nonicteric.    NECK: Supple, no masses or JVD appreciated.   C/V:  Regular rate and rhythm, normal S1 and S2, no S3 or S4, no murmur, rub or gallop.   RESP: Respirations are unlabored. Lungs are clear to auscultation bilaterally without wheezing, rales, or rhonchi.  EXTREM: No clubbing, cyanosis, or lower extremity edema bilaterally.   NEURO: Alert and oriented, cooperative. Gait steady. No gross focal deficits.   PSYCH: Affect appropriate. Mentation normal. Responds to questions appropriately.  SKIN: Warm and dry.    Recent Lab Results:  LIPID RESULTS:  Lab Results   Component Value Date    CHOL 141 01/31/2022    CHOL 148 01/26/2021    HDL 38 (L) 01/31/2022    HDL 39 01/26/2021    LDL 76 01/31/2022    LDL 87 01/26/2021    TRIG 135 01/31/2022    TRIG 111 01/26/2021    CHOLHDLRATIO 3.2 07/09/2015     LIVER ENZYME RESULTS:  Lab Results   Component Value Date    AST 21 06/06/2021    ALT 23 01/31/2022    ALT 25 06/06/2021     CBC RESULTS:  Lab Results   Component Value Date    WBC 7.4 06/06/2021    RBC 4.53 06/06/2021    HGB 12.1 06/06/2021    HCT 38.5 06/06/2021    MCV 85 06/06/2021    MCH 26.7 06/06/2021    MCHC 31.4 (L) 06/06/2021    RDW 13.7 06/06/2021     06/06/2021     BMP RESULTS:  Lab Results   Component Value Date     01/31/2022     06/06/2021    POTASSIUM 3.3 (L) 01/31/2022    POTASSIUM 4.0 06/06/2021    CHLORIDE 109 01/31/2022    CHLORIDE 108 06/06/2021    CO2 26 01/31/2022    CO2 31 06/06/2021    ANIONGAP 5 01/31/2022    ANIONGAP 3 06/06/2021    GLC 90 01/31/2022    GLC 86 06/06/2021    BUN 11 01/31/2022    BUN 14 06/06/2021    CR 0.82 01/31/2022    CR 0.89 06/06/2021    GFRESTIMATED 83 01/31/2022    GFRESTIMATED 72 06/06/2021    GFRESTBLACK 84 06/06/2021    ROSANNA 8.8 01/31/2022    ROSANNA 9.3 06/06/2021      A1C RESULTS:  Lab Results   Component Value Date    A1C 5.8 (H) 10/21/2019     This note was completed in part  using Dragon voice recognition software. Although reviewed after completion, some word and grammatical errors may occur.    Thank you for allowing me to participate in the care of your patient.      Sincerely,     Sin Ford NP     [facility]  cc:   No referring provider defined for this encounter.

## 2022-02-14 DIAGNOSIS — I25.10 CORONARY ARTERY DISEASE INVOLVING NATIVE CORONARY ARTERY OF NATIVE HEART WITHOUT ANGINA PECTORIS: ICD-10-CM

## 2022-02-14 RX ORDER — NITROGLYCERIN 0.4 MG/1
0.4 TABLET SUBLINGUAL EVERY 5 MIN PRN
Qty: 25 TABLET | Refills: 1 | Status: SHIPPED | OUTPATIENT
Start: 2022-02-14 | End: 2023-03-27

## 2023-03-27 DIAGNOSIS — E78.2 MIXED HYPERLIPIDEMIA: ICD-10-CM

## 2023-03-27 DIAGNOSIS — I10 BENIGN ESSENTIAL HYPERTENSION: ICD-10-CM

## 2023-03-27 DIAGNOSIS — I25.10 CORONARY ARTERY DISEASE INVOLVING NATIVE CORONARY ARTERY OF NATIVE HEART WITHOUT ANGINA PECTORIS: ICD-10-CM

## 2023-03-27 DIAGNOSIS — I70.1 RENAL ARTERY STENOSIS (H): ICD-10-CM

## 2023-03-27 DIAGNOSIS — I25.110 CORONARY ARTERY DISEASE INVOLVING NATIVE CORONARY ARTERY OF NATIVE HEART WITH UNSTABLE ANGINA PECTORIS (H): ICD-10-CM

## 2023-03-27 DIAGNOSIS — E66.01 MORBID OBESITY (H): ICD-10-CM

## 2023-03-27 DIAGNOSIS — I77.3 FIBROMUSCULAR DYSPLASIA (H): ICD-10-CM

## 2023-03-27 RX ORDER — METOPROLOL SUCCINATE 25 MG/1
25 TABLET, EXTENDED RELEASE ORAL DAILY
Qty: 90 TABLET | Refills: 0 | Status: SHIPPED | OUTPATIENT
Start: 2023-03-27 | End: 2023-05-08

## 2023-03-27 RX ORDER — LOSARTAN POTASSIUM 100 MG/1
100 TABLET ORAL DAILY
Qty: 90 TABLET | Refills: 0 | Status: SHIPPED | OUTPATIENT
Start: 2023-03-27 | End: 2023-05-08

## 2023-03-27 RX ORDER — ASPIRIN 81 MG/1
81 TABLET ORAL DAILY
Qty: 90 TABLET | Refills: 0 | Status: SHIPPED | OUTPATIENT
Start: 2023-03-27 | End: 2023-05-08

## 2023-03-27 RX ORDER — EZETIMIBE 10 MG/1
10 TABLET ORAL DAILY
Qty: 90 TABLET | Refills: 0 | Status: SHIPPED | OUTPATIENT
Start: 2023-03-27 | End: 2023-05-08

## 2023-03-27 RX ORDER — ROSUVASTATIN CALCIUM 40 MG/1
40 TABLET, COATED ORAL DAILY
Qty: 90 TABLET | Refills: 0 | Status: SHIPPED | OUTPATIENT
Start: 2023-03-27 | End: 2023-05-08

## 2023-03-27 RX ORDER — AMLODIPINE BESYLATE 10 MG/1
10 TABLET ORAL DAILY
Qty: 90 TABLET | Refills: 0 | Status: SHIPPED | OUTPATIENT
Start: 2023-03-27 | End: 2023-05-08

## 2023-03-27 RX ORDER — NITROGLYCERIN 0.4 MG/1
0.4 TABLET SUBLINGUAL EVERY 5 MIN PRN
Qty: 25 TABLET | Refills: 1 | Status: SHIPPED | OUTPATIENT
Start: 2023-03-27 | End: 2023-05-08

## 2023-03-27 NOTE — PROGRESS NOTES
REFILL REQUEST    [facility]iology Refill Guideline reviewed.  Medication meets criteria for refill.    Ingris Painting RN 03/27/23 4:07 PM    Future Appointments   Date Time Provider Department Center   5/8/2023  9:45 AM [facility]   5/8/2023 10:45 AM Mica Tobias MD [facility]

## 2023-05-03 ENCOUNTER — TELEPHONE (OUTPATIENT)
Dept: CARDIOLOGY | Facility: CLINIC | Age: 58
End: 2023-05-03
Payer: COMMERCIAL

## 2023-05-03 DIAGNOSIS — I25.10 CORONARY ARTERY DISEASE INVOLVING NATIVE CORONARY ARTERY OF NATIVE HEART WITHOUT ANGINA PECTORIS: Primary | ICD-10-CM

## 2023-05-03 DIAGNOSIS — E78.2 MIXED HYPERLIPIDEMIA: ICD-10-CM

## 2023-05-03 DIAGNOSIS — I10 BENIGN ESSENTIAL HYPERTENSION: ICD-10-CM

## 2023-05-03 NOTE — TELEPHONE ENCOUNTER
FLP/ALT and BMP  orders entered.     Currently on Rosuvastatin     Last FLP's 1-31-22  Last BMP 1-31-22.     Last KAYLEE OV 2-7-23  - Recommended FLP/ALT.     Last Dr. Landeros OV 1-27-21 -

## 2023-05-07 NOTE — PROGRESS NOTES
HPI and Plan:     I had the pleasure of seeing Ms. Ni in followup at the [facility] today.  She is a very pleasant 57-year-old female who I last saw in 01/2021.  She has a history of coronary artery disease and is status post angioplasty and stenting of a 95% LAD lesion in 08/2011.  She also has a history of fibromuscular dysplasia of the renal arteries and has undergone PTCA in the past.      Her last coronary angiogram was in 06/2014 at which point no significant obstructive disease was found. She has also undergone a workup for thoracic outlet syndrome which has been unrevealing.     Prior to her last office visit she had been complaining of some chest discomfort and a subsequent stress perfusion study in October 2019 was negative for ischemia.  She had also undergone a renal artery ultrasound in December 2019 that did demonstrate elevated velocities in the right renal artery.  I discussed this with Dr. Taveras, who recommended continued medical therapy given that her blood pressure was well controlled.     She presents today feeling well overall from a cardiovascular standpoint.  She specifically denies any chest discomfort since her hospitalization. She denies any dyspnea on exertion, PND, orthopnea or lower extremity edema.  She denies any syncope or presyncope.         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  Of note, her blood pressure has been mildly elevated at home in the 140 mmHg systolic range.    Her physical exam is as dictated below.   I did recheck her blood pressure and it was still elevated at approximately 140 mmHg.     A basic metabolic panel today demonstrated sodium 142, potassium 3.6, creatinine of 1.87 and glucose of 102.  ALT was normal at 19.  Lipid panel is still pending.       IMPRESSION:   1.  Coronary artery disease, status post drug-eluting stent placement to mid LAD in 2011.  Her most recent stress  perfusion study from 2019 was negative for ischemia.    2.  History of renal fibromuscular dysplasia, status bilateral balloon angioplasty in December 2011..  She was noted to have elevated velocities in the right renal artery on an ultrasound from 12/2019.  Continued medical therapy was recommended.  3.  Hypertension.  Blood pressure elevated today on her current regimen of amlodipine 10 mg daily, losartan 100 mg daily, Toprol-XL 25 mg daily.  4.  Dyslipidemia.   Previously well controlled on rosuvastatin 40 mg daily.        The patient is doing well overall from a cardiovascular standpoint.  There is no evidence of angina or congestive heart failure.     I am concerned about her elevated blood pressure readings today.  I have asked her to keep track of this at home and we will schedule repeat renal ultrasound as well as an echocardiogram for further evaluation.  If her blood pressure remains elevated at home and her renal artery velocities are increased, I will discuss further steps in the vascular colleagues.    It was a pleasure seeing her today.        CURRENT MEDICATIONS:  Current Outpatient Medications   Medication Sig Dispense Refill     albuterol (PROAIR HFA/PROVENTIL HFA/VENTOLIN HFA) 108 (90 Base) MCG/ACT inhaler Inhale 1-2 puffs into the lungs every 4 hours as needed for shortness of breath / dyspnea or wheezing       amLODIPine (NORVASC) 10 MG tablet Take 1 tablet (10 mg) by mouth daily 90 tablet 0     aspirin 81 MG EC tablet Take 1 tablet (81 mg) by mouth daily 90 tablet 0     ezetimibe (ZETIA) 10 MG tablet Take 1 tablet (10 mg) by mouth daily 90 tablet 0     losartan (COZAAR) 100 MG tablet Take 1 tablet (100 mg) by mouth daily 90 tablet 0     metoprolol succinate ER (TOPROL XL) 25 MG 24 hr tablet Take 1 tablet (25 mg) by mouth daily 90 tablet 0     nitroGLYcerin (NITROSTAT) 0.4 MG sublingual tablet Place 1 tablet (0.4 mg) under the tongue every 5 minutes as needed for chest pain 25 tablet 1      omeprazole (PRILOSEC) 20 MG DR capsule Take 20 mg by mouth daily       rosuvastatin (CRESTOR) 40 MG tablet Take 1 tablet (40 mg) by mouth daily 90 tablet 0       ALLERGIES     Allergies   Allergen Reactions     Imdur [Isosorbide Nitrate] Other (See Comments)     Headache.  Patient took 30mg dose for 2 days and had HA on consecutive days.     Morphine Sulfate Nausea and Vomiting       PAST MEDICAL HISTORY:  Past Medical History:   Diagnosis Date     Asthma      Coronary artery disease     cardiac cath 2011: JUDY to LAD and 1st diagonal     Fibromuscular dysplasia (H)      GERD (gastroesophageal reflux disease)      Hyperlipidemia LDL goal <70      Hypertension      MI (myocardial infarction) (H)      Peripheral vascular disease (H)     angioplasty bilat renal arteries     Renal artery stenosis (H)     Bilateral moderate renal stenosis noted on 13 renal ultrasound     Sleep apnea     mild per sleep study [facility] 7-10-15       PAST SURGICAL HISTORY:  Past Surgical History:   Procedure Laterality Date     COLONOSCOPY N/A 2015    Procedure: COLONOSCOPY;  Surgeon: Juan Miguel Garcia MD;  Location:  GI     GYN SURGERY       HYSTERECTOMY      partial       FAMILY HISTORY:  Family History   Problem Relation Age of Onset     Heart Disease Mother      Heart Disease Sister        SOCIAL HISTORY:  Social History     Socioeconomic History     Marital status: Single   Tobacco Use     Smoking status: Former     Packs/day: 1.00     Years: 20.00     Pack years: 20.00     Types: Cigarettes     Start date:      Quit date:      Years since quittin.3     Smokeless tobacco: Former     Quit date: 1995   Substance and Sexual Activity     Alcohol use: Yes     Comment: wine on rare occasions      Drug use: No   Other Topics Concern     Sleep Concern No     Stress Concern No     Exercise Yes     Comment: on occasion       Review of Systems:  Skin:          Eyes:         ENT:          Respiratory:          Cardiovascular:         Gastroenterology:        Genitourinary:         Musculoskeletal:         Neurologic:         Psychiatric:         Heme/Lymph/Imm:         Endocrine:           Physical Exam:  Vitals: There were no vitals taken for this visit.    Constitutional:  cooperative, alert and oriented, well developed, well nourished, in no acute distress        Skin:  warm and dry to the touch          Head:           Eyes:  pupils equal and round        Lymph:      ENT:  no pallor or cyanosis        Neck:  JVP normal        Respiratory:  clear to auscultation         Cardiac: regular rhythm                                                         GI:           Extremities and Muscular Skeletal:                 Neurological:           Psych:           CC  No referring provider defined for this encounter.

## 2023-05-08 ENCOUNTER — TELEPHONE (OUTPATIENT)
Dept: CARDIOLOGY | Facility: CLINIC | Age: 58
End: 2023-05-08

## 2023-05-08 ENCOUNTER — LAB (OUTPATIENT)
Dept: LAB | Facility: CLINIC | Age: 58
End: 2023-05-08
Payer: COMMERCIAL

## 2023-05-08 ENCOUNTER — OFFICE VISIT (OUTPATIENT)
Dept: CARDIOLOGY | Facility: CLINIC | Age: 58
End: 2023-05-08
Payer: COMMERCIAL

## 2023-05-08 VITALS
HEIGHT: 64 IN | SYSTOLIC BLOOD PRESSURE: 146 MMHG | BODY MASS INDEX: 36.19 KG/M2 | WEIGHT: 212 LBS | OXYGEN SATURATION: 97 % | HEART RATE: 64 BPM | DIASTOLIC BLOOD PRESSURE: 80 MMHG

## 2023-05-08 DIAGNOSIS — I25.10 CORONARY ARTERY DISEASE INVOLVING NATIVE CORONARY ARTERY OF NATIVE HEART WITHOUT ANGINA PECTORIS: Primary | ICD-10-CM

## 2023-05-08 DIAGNOSIS — I25.10 CORONARY ARTERY DISEASE INVOLVING NATIVE CORONARY ARTERY OF NATIVE HEART WITHOUT ANGINA PECTORIS: ICD-10-CM

## 2023-05-08 DIAGNOSIS — I10 BENIGN ESSENTIAL HYPERTENSION: ICD-10-CM

## 2023-05-08 DIAGNOSIS — I70.1 RENAL ARTERY STENOSIS (H): ICD-10-CM

## 2023-05-08 DIAGNOSIS — E66.01 MORBID OBESITY (H): ICD-10-CM

## 2023-05-08 DIAGNOSIS — I25.110 CORONARY ARTERY DISEASE INVOLVING NATIVE CORONARY ARTERY OF NATIVE HEART WITH UNSTABLE ANGINA PECTORIS (H): ICD-10-CM

## 2023-05-08 DIAGNOSIS — E78.2 MIXED HYPERLIPIDEMIA: ICD-10-CM

## 2023-05-08 DIAGNOSIS — I77.3 FIBROMUSCULAR DYSPLASIA (H): ICD-10-CM

## 2023-05-08 LAB
ALT SERPL W P-5'-P-CCNC: 19 U/L (ref 10–35)
ANION GAP SERPL CALCULATED.3IONS-SCNC: 11 MMOL/L (ref 7–15)
BUN SERPL-MCNC: 10.6 MG/DL (ref 6–20)
CALCIUM SERPL-MCNC: 9.8 MG/DL (ref 8.6–10)
CHLORIDE SERPL-SCNC: 105 MMOL/L (ref 98–107)
CHOLEST SERPL-MCNC: 153 MG/DL
CREAT SERPL-MCNC: 0.87 MG/DL (ref 0.51–0.95)
DEPRECATED HCO3 PLAS-SCNC: 26 MMOL/L (ref 22–29)
GFR SERPL CREATININE-BSD FRML MDRD: 77 ML/MIN/1.73M2
GLUCOSE SERPL-MCNC: 102 MG/DL (ref 70–99)
HDLC SERPL-MCNC: 37 MG/DL
LDLC SERPL CALC-MCNC: 88 MG/DL
NONHDLC SERPL-MCNC: 116 MG/DL
POTASSIUM SERPL-SCNC: 3.6 MMOL/L (ref 3.4–5.3)
SODIUM SERPL-SCNC: 142 MMOL/L (ref 136–145)
TRIGL SERPL-MCNC: 139 MG/DL

## 2023-05-08 PROCEDURE — 36415 COLL VENOUS BLD VENIPUNCTURE: CPT | Performed by: INTERNAL MEDICINE

## 2023-05-08 PROCEDURE — 84460 ALANINE AMINO (ALT) (SGPT): CPT | Performed by: INTERNAL MEDICINE

## 2023-05-08 PROCEDURE — 80061 LIPID PANEL: CPT | Performed by: INTERNAL MEDICINE

## 2023-05-08 PROCEDURE — 80048 BASIC METABOLIC PNL TOTAL CA: CPT | Performed by: INTERNAL MEDICINE

## 2023-05-08 PROCEDURE — 99214 OFFICE O/P EST MOD 30 MIN: CPT | Performed by: INTERNAL MEDICINE

## 2023-05-08 RX ORDER — METOPROLOL SUCCINATE 25 MG/1
25 TABLET, EXTENDED RELEASE ORAL DAILY
Qty: 90 TABLET | Refills: 3 | Status: SHIPPED | OUTPATIENT
Start: 2023-05-08 | End: 2024-06-19

## 2023-05-08 RX ORDER — AMLODIPINE BESYLATE 10 MG/1
10 TABLET ORAL DAILY
Qty: 90 TABLET | Refills: 3 | Status: SHIPPED | OUTPATIENT
Start: 2023-05-08 | End: 2024-08-30

## 2023-05-08 RX ORDER — ROSUVASTATIN CALCIUM 40 MG/1
40 TABLET, COATED ORAL DAILY
Qty: 90 TABLET | Refills: 3 | Status: SHIPPED | OUTPATIENT
Start: 2023-05-08 | End: 2024-08-30

## 2023-05-08 RX ORDER — NITROGLYCERIN 0.4 MG/1
0.4 TABLET SUBLINGUAL EVERY 5 MIN PRN
Qty: 25 TABLET | Refills: 3 | Status: SHIPPED | OUTPATIENT
Start: 2023-05-08 | End: 2024-09-05

## 2023-05-08 RX ORDER — LOSARTAN POTASSIUM 100 MG/1
100 TABLET ORAL DAILY
Qty: 90 TABLET | Refills: 3 | Status: SHIPPED | OUTPATIENT
Start: 2023-05-08 | End: 2024-07-12

## 2023-05-08 RX ORDER — ASPIRIN 81 MG/1
81 TABLET ORAL DAILY
Qty: 90 TABLET | Refills: 3 | Status: SHIPPED | OUTPATIENT
Start: 2023-05-08 | End: 2024-06-25

## 2023-05-08 RX ORDER — EZETIMIBE 10 MG/1
10 TABLET ORAL DAILY
Qty: 90 TABLET | Refills: 3 | Status: SHIPPED | OUTPATIENT
Start: 2023-05-08

## 2023-05-08 NOTE — TELEPHONE ENCOUNTER
Let's check if she's been compliant. If so, I would recommend she increase her exercise routine and rechecking in 3 months. Thanks.

## 2023-05-08 NOTE — LETTER
5/8/2023    DO [facility] [address] [place] [address]    RE: Glenis Ni       Dear Colleague,     I had the pleasure of seeing Glenis Ni in the [facility].  HPI and Plan:     I had the pleasure of seeing Ms. Ni in followup at the [facility] today.  She is a very pleasant 57-year-old female who I last saw in 01/2021.  She has a history of coronary artery disease and is status post angioplasty and stenting of a 95% LAD lesion in 08/2011.  She also has a history of fibromuscular dysplasia of the renal arteries and has undergone PTCA in the past.      Her last coronary angiogram was in 06/2014 at which point no significant obstructive disease was found. She has also undergone a workup for thoracic outlet syndrome which has been unrevealing.     Prior to her last office visit she had been complaining of some chest discomfort and a subsequent stress perfusion study in October 2019 was negative for ischemia.  She had also undergone a renal artery ultrasound in December 2019 that did demonstrate elevated velocities in the right renal artery.  I discussed this with Dr. Taveras, who recommended continued medical therapy given that her blood pressure was well controlled.     She presents today feeling well overall from a cardiovascular standpoint.  She specifically denies any chest discomfort since her hospitalization. She denies any dyspnea on exertion, PND, orthopnea or lower extremity edema.  She denies any syncope or presyncope.         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  Of note, her blood pressure has been mildly elevated at home in the 140 mmHg systolic range.    Her physical exam is as dictated below.   I did recheck her blood pressure and it was still elevated at approximately 140 mmHg.     A basic metabolic panel today demonstrated sodium 142, potassium  3.6, creatinine of 1.87 and glucose of 102.  ALT was normal at 19.  Lipid panel is still pending.       IMPRESSION:   1.  Coronary artery disease, status post drug-eluting stent placement to mid LAD in 2011.  Her most recent stress perfusion study from 2019 was negative for ischemia.    2.  History of renal fibromuscular dysplasia, status bilateral balloon angioplasty in December 2011..  She was noted to have elevated velocities in the right renal artery on an ultrasound from 12/2019.  Continued medical therapy was recommended.  3.  Hypertension.  Blood pressure elevated today on her current regimen of amlodipine 10 mg daily, losartan 100 mg daily, Toprol-XL 25 mg daily.  4.  Dyslipidemia.   Previously well controlled on rosuvastatin 40 mg daily.        The patient is doing well overall from a cardiovascular standpoint.  There is no evidence of angina or congestive heart failure.     I am concerned about her elevated blood pressure readings today.  I have asked her to keep track of this at home and we will schedule repeat renal ultrasound as well as an echocardiogram for further evaluation.  If her blood pressure remains elevated at home and her renal artery velocities are increased, I will discuss further steps in the vascular colleagues.    It was a pleasure seeing her today.        CURRENT MEDICATIONS:  Current Outpatient Medications   Medication Sig Dispense Refill    albuterol (PROAIR HFA/PROVENTIL HFA/VENTOLIN HFA) 108 (90 Base) MCG/ACT inhaler Inhale 1-2 puffs into the lungs every 4 hours as needed for shortness of breath / dyspnea or wheezing      amLODIPine (NORVASC) 10 MG tablet Take 1 tablet (10 mg) by mouth daily 90 tablet 0    aspirin 81 MG EC tablet Take 1 tablet (81 mg) by mouth daily 90 tablet 0    ezetimibe (ZETIA) 10 MG tablet Take 1 tablet (10 mg) by mouth daily 90 tablet 0    losartan (COZAAR) 100 MG tablet Take 1 tablet (100 mg) by mouth daily 90 tablet 0    metoprolol succinate ER (TOPROL XL)  25 MG 24 hr tablet Take 1 tablet (25 mg) by mouth daily 90 tablet 0    nitroGLYcerin (NITROSTAT) 0.4 MG sublingual tablet Place 1 tablet (0.4 mg) under the tongue every 5 minutes as needed for chest pain 25 tablet 1    omeprazole (PRILOSEC) 20 MG DR capsule Take 20 mg by mouth daily      rosuvastatin (CRESTOR) 40 MG tablet Take 1 tablet (40 mg) by mouth daily 90 tablet 0       ALLERGIES     Allergies   Allergen Reactions    Imdur [Isosorbide Nitrate] Other (See Comments)     Headache.  Patient took 30mg dose for 2 days and had HA on consecutive days.    Morphine Sulfate Nausea and Vomiting       PAST MEDICAL HISTORY:  Past Medical History:   Diagnosis Date    Asthma     Coronary artery disease     cardiac cath : JUDY to LAD and 1st diagonal    Fibromuscular dysplasia (H)     GERD (gastroesophageal reflux disease)     Hyperlipidemia LDL goal <70     Hypertension     MI (myocardial infarction) (H)     Peripheral vascular disease (H)     angioplasty bilat renal arteries    Renal artery stenosis (H)     Bilateral moderate renal stenosis noted on 13 renal ultrasound    Sleep apnea     mild per sleep study [facility] 7-10-15       PAST SURGICAL HISTORY:  Past Surgical History:   Procedure Laterality Date    COLONOSCOPY N/A 2015    Procedure: COLONOSCOPY;  Surgeon: Juan Miguel Garcia MD;  Location:  GI    GYN SURGERY      HYSTERECTOMY      partial       FAMILY HISTORY:  Family History   Problem Relation Age of Onset    Heart Disease Mother     Heart Disease Sister        SOCIAL HISTORY:  Social History     Socioeconomic History    Marital status: Single   Tobacco Use    Smoking status: Former     Packs/day: 1.00     Years: 20.00     Pack years: 20.00     Types: Cigarettes     Start date:      Quit date:      Years since quittin.3    Smokeless tobacco: Former     Quit date: 1995   Substance and Sexual Activity    Alcohol use: Yes     Comment: wine on rare occasions     Drug  use: No   Other Topics Concern    Sleep Concern No    Stress Concern No    Exercise Yes     Comment: on occasion       Review of Systems:  Skin:          Eyes:         ENT:         Respiratory:          Cardiovascular:         Gastroenterology:        Genitourinary:         Musculoskeletal:         Neurologic:         Psychiatric:         Heme/Lymph/Imm:         Endocrine:           Physical Exam:  Vitals: There were no vitals taken for this visit.    Constitutional:  cooperative, alert and oriented, well developed, well nourished, in no acute distress        Skin:  warm and dry to the touch          Head:           Eyes:  pupils equal and round        Lymph:      ENT:  no pallor or cyanosis        Neck:  JVP normal        Respiratory:  clear to auscultation         Cardiac: regular rhythm                                                         GI:           Extremities and Muscular Skeletal:                 Neurological:           Psych:           CC  No referring provider defined for this encounter.      Thank you for allowing me to participate in the care of your patient.      Sincerely,     Mica Tobias MD     [facility]

## 2023-05-08 NOTE — TELEPHONE ENCOUNTER
FLP not available at time of visit today, routed to Dr Tobias to review. Pt is on rosuvastatin 40mg and zetia 10mg daily. Hx CAD/stents. Previous LDL = 76 in Jan 2022.     Component      Latest Ref Rng 5/8/2023  9:32 AM   Cholesterol      <200 mg/dL 153    Triglycerides      <150 mg/dL 139    HDL Cholesterol      >=50 mg/dL 37 (L)    LDL Cholesterol Calculated      <=100 mg/dL 88    Non HDL Cholesterol      <130 mg/dL 116

## 2023-05-09 NOTE — TELEPHONE ENCOUNTER
Spoke to patient and reviewed lab results. She states she is compliant with her medications. She will increase her activity levels as recommended by Dr Tobias and recheck fasting lipids in 3mos. Orders placed and scheduling phone number provided to call and arrange.

## 2023-05-16 ENCOUNTER — TELEPHONE (OUTPATIENT)
Dept: CARDIOLOGY | Facility: CLINIC | Age: 58
End: 2023-05-16

## 2023-05-16 NOTE — TELEPHONE ENCOUNTER
Per appointment note, patients are to fast for 8 hours prior to test. Called patient and reviewed this, will need to reschedule. Appointment cancelled for this afternoon and scheduling messaged to contact patient.

## 2023-05-16 NOTE — TELEPHONE ENCOUNTER
[facility] Call Center    Phone Message    May a detailed message be left on voicemail: yes     Reason for Call: Other:     Pt is calling to report that she did eat today and is asking if she should still come in for appointment.  Please call back to inform.    Action Taken: Other: cardio    Travel Screening: Not Applicable     Thank you!  [facility]

## 2023-05-23 ENCOUNTER — TELEPHONE (OUTPATIENT)
Dept: CARDIOLOGY | Facility: CLINIC | Age: 58
End: 2023-05-23

## 2023-05-23 ENCOUNTER — HOSPITAL ENCOUNTER (OUTPATIENT)
Dept: ULTRASOUND IMAGING | Facility: CLINIC | Age: 58
Discharge: HOME | End: 2023-05-23
Attending: INTERNAL MEDICINE | Admitting: INTERNAL MEDICINE
Payer: COMMERCIAL

## 2023-05-23 DIAGNOSIS — I70.1 RENAL ARTERY STENOSIS (H): ICD-10-CM

## 2023-05-23 PROCEDURE — 76770 US EXAM ABDO BACK WALL COMP: CPT

## 2023-05-23 PROCEDURE — 93975 VASCULAR STUDY: CPT | Mod: 26 | Performed by: INTERNAL MEDICINE

## 2023-05-23 PROCEDURE — 76770 US EXAM ABDO BACK WALL COMP: CPT | Mod: 26 | Performed by: INTERNAL MEDICINE

## 2023-05-23 NOTE — TELEPHONE ENCOUNTER
"Renal US completed as below, ordered by Dr Tobias due to history of HTN, renal fibromuscular dysplasia status bilateral balloon angioplasty in December 2011. Echo is scheduled for June. Called patient for update on BP before routing to Dr Tobias, says she hasn't really been checking her BP as her cuff at home doesn't work. She said it was 143/__ today at her US appointment. Dr Tobias messaged.       OV note 5/28/23-   \"I am concerned about her elevated blood pressure readings today.  I have asked her to keep track of this at home and we will schedule repeat renal ultrasound as well as an echocardiogram for further evaluation.  If her blood pressure remains elevated at home and her renal artery velocities are increased, I will discuss further steps in the vascular colleagues.\"       CONCLUSION:  1. Right renal artery: Greater than 60% stenosis based on V. Max 221 cm/s  2. Left renal artery:The flow velocities in the left renal artery are  within the normal range indicating less than 60% diameter stenosis.  INCIDENTAL FINDINGS:  No significant incidental findings observed.  COMPARISON:  Renal ultrasound from 2019 showed right renal artery V. Max 316 cm/s, ratio 3.1.  "

## 2023-05-23 NOTE — TELEPHONE ENCOUNTER
Mike, could you look at this patient's renal US? How should we approach it? BP is running a little high. Thanks!

## 2023-05-24 ENCOUNTER — TELEPHONE (OUTPATIENT)
Dept: CARDIOLOGY | Facility: CLINIC | Age: 58
End: 2023-05-24
Payer: COMMERCIAL

## 2023-05-24 DIAGNOSIS — I25.10 CORONARY ARTERY DISEASE: ICD-10-CM

## 2023-05-24 DIAGNOSIS — I70.1 RENAL ARTERY STENOSIS (H): Primary | ICD-10-CM

## 2023-05-24 NOTE — TELEPHONE ENCOUNTER
Spoke with patient about seeing Dr. Taveras to review her renal stenosis and consider next steps. She is willing to see Dr. Taveras.  Order placed for OV with vascular cardiology visit with Dr. Taveras.  Scheduling message to find appointment.

## 2023-05-24 NOTE — TELEPHONE ENCOUNTER
Mica Tobias MD Jama, Abdi Ahmad, MD; [facility] Team 2 1 hour ago (7:55 AM)     Mike Berger!         Note      Mike Taveras MD Hiljus, Audrey G, RN; [facility] Team 2; Mica Tobias MD 17 hours ago (4:48 PM)     AJ  I reviewed the patient's renal artery ultrasound.  The right renal artery velocities are elevated and suggest perhaps greater than 60% stenosis.  I would like to see her in the clinic to discuss the results of the renal artery ultrasound and indications for intervention. Thanks     Ksenia Montalvo, RACHEL routed conversation to Mike Taveras MD; [facility] 2 17 hours ago (4:22 PM)     Mica Tobias MD Hiljus, Audrey G, RACHEL; Mike Taveras MD 17 hours ago (4:20 PM)     YUE Mckeon, could you look at this patient's renal US? How should we approach it? BP is running a little high. Thanks!         Note

## 2023-05-31 NOTE — TELEPHONE ENCOUNTER
Dr Taveras's reply 5/23/23-  Mike Taveras MD  to Me   [facility] 2   Mica Tobias MD AJ    5/23/23  4:48 PM  I reviewed the patient's renal artery ultrasound.  The right renal artery velocities are elevated and suggest perhaps greater than 60% stenosis.  I would like to see her in the clinic to discuss the results of the renal artery ultrasound and indications for intervention. Darwin Mckeon     Patient is seeing him on 6/5/23 to discuss results.

## 2023-06-05 ENCOUNTER — OFFICE VISIT (OUTPATIENT)
Dept: CARDIOLOGY | Facility: CLINIC | Age: 58
End: 2023-06-05
Payer: COMMERCIAL

## 2023-06-05 VITALS
DIASTOLIC BLOOD PRESSURE: 77 MMHG | OXYGEN SATURATION: 97 % | SYSTOLIC BLOOD PRESSURE: 119 MMHG | HEIGHT: 64 IN | WEIGHT: 218 LBS | HEART RATE: 63 BPM | BODY MASS INDEX: 37.22 KG/M2

## 2023-06-05 DIAGNOSIS — I70.1 RENAL ARTERY STENOSIS (H): ICD-10-CM

## 2023-06-05 PROCEDURE — 99214 OFFICE O/P EST MOD 30 MIN: CPT | Performed by: INTERNAL MEDICINE

## 2023-06-05 NOTE — LETTER
6/5/2023    Charisse Stasdesmondsagar   [facility] [address] [place] [address]    RE: Glenis Ni       Dear Colleague,     I had the pleasure of seeing Glenis Ni in the [facility].  REASON FOR CONSULTATION: Renal artery FMD and renal vascular hypertension    HISTORY OF PRESENT ILLNESS: I had the pleasure of seeing Glenis Ni at the [facility] cardiovascular clinic anytime in [place] this afternoon.  She has known coronary disease and has prior LAD stenting.  She also has longstanding hypertension, known renal artery fibromuscular dysplasia status post prior renal angioplasties.  She was recently seen in the clinic and was noted to have elevated blood pressures.  She underwent renal artery ultrasound which I personally reviewed.  The ultrasound revealed elevated velocities in the mid to distal right renal artery suggesting greater than 60% stenosis.  The left renal artery velocities were within the normal range.    The patient had renal artery angiogram in 2011 and had angioplasty of both renal arteries to treat her stenosis caused by FMD.  Her blood pressure has been reasonably well controlled since then.  Her other last blood pressure in the office was elevated, today's blood pressure is excellent.  Specifically her systolic blood pressure is 117.  She also reports excellent blood pressure control at home.    ASSESSMENT AND PLAN: A very pleasant 57-year-old female with renal artery FMD and prior angioplasty who is here for further evaluation.  I reviewed the ultrasound findings with her.  I also reviewed her prior angiogram.  We discussed indications for repeat angioplasty.  At this point, given good blood pressure control, repeat angiogram/ angioplasty is not indicated.  However, I instructed her to keep an eye on her blood pressure and to let me know if her blood pressure is persistently above 130 systolic.    I will see her in approximately 1  year with repeat ultrasound for longitudinal follow-up.    I appreciate the opportunity to participate in her care    Mike Taveras MD    Today's clinic visit entailed:  45 minutes spent by me on the date of the encounter doing chart review, history and exam, documentation and further activities per the note  Provider  Link to MDM Help Grid       Orders Placed This Encounter   Procedures    Follow-Up with Cardiology       Orders Placed This Encounter   Medications    UNABLE TO FIND     Sig: MEDICATION NAME: Sea young       There are no discontinued medications.      Encounter Diagnosis   Name Primary?    Renal artery stenosis (H)        CURRENT MEDICATIONS:  Current Outpatient Medications   Medication Sig Dispense Refill    albuterol (PROAIR HFA/PROVENTIL HFA/VENTOLIN HFA) 108 (90 Base) MCG/ACT inhaler Inhale 1-2 puffs into the lungs every 4 hours as needed for shortness of breath / dyspnea or wheezing      amLODIPine (NORVASC) 10 MG tablet Take 1 tablet (10 mg) by mouth daily 90 tablet 3    aspirin 81 MG EC tablet Take 1 tablet (81 mg) by mouth daily 90 tablet 3    ezetimibe (ZETIA) 10 MG tablet Take 1 tablet (10 mg) by mouth daily 90 tablet 3    losartan (COZAAR) 100 MG tablet Take 1 tablet (100 mg) by mouth daily 90 tablet 3    metoprolol succinate ER (TOPROL XL) 25 MG 24 hr tablet Take 1 tablet (25 mg) by mouth daily 90 tablet 3    nitroGLYcerin (NITROSTAT) 0.4 MG sublingual tablet Place 1 tablet (0.4 mg) under the tongue every 5 minutes as needed for chest pain 25 tablet 3    omeprazole (PRILOSEC) 20 MG DR capsule Take 20 mg by mouth daily      rosuvastatin (CRESTOR) 40 MG tablet Take 1 tablet (40 mg) by mouth daily 90 tablet 3    UNABLE TO FIND MEDICATION NAME: Sea young         ALLERGIES     Allergies   Allergen Reactions    Imdur [Isosorbide Nitrate] Other (See Comments)     Headache.  Patient took 30mg dose for 2 days and had HA on consecutive days.    Morphine Sulfate Nausea and Vomiting    Penicillins Itching        PAST MEDICAL HISTORY:  Past Medical History:   Diagnosis Date    Asthma     Coronary artery disease     cardiac cath 2011: JUDY to LAD and 1st diagonal    Fibromuscular dysplasia (H)     GERD (gastroesophageal reflux disease)     Hyperlipidemia LDL goal <70     Hypertension     MI (myocardial infarction) (H)     Peripheral vascular disease (H)     angioplasty bilat renal arteries    Renal artery stenosis (H)     Bilateral moderate renal stenosis noted on 13 renal ultrasound    Sleep apnea     mild per sleep study [facility] 7-10-15       PAST SURGICAL HISTORY:  Past Surgical History:   Procedure Laterality Date    COLONOSCOPY N/A 2015    Procedure: COLONOSCOPY;  Surgeon: Juan Miguel Garcia MD;  Location:  GI    GYN SURGERY      HYSTERECTOMY      partial       FAMILY HISTORY:  Family History   Problem Relation Age of Onset    Heart Disease Mother     Heart Disease Sister        SOCIAL HISTORY:  Social History     Socioeconomic History    Marital status: Single     Spouse name: None    Number of children: None    Years of education: None    Highest education level: None   Tobacco Use    Smoking status: Former     Packs/day: 1.00     Years: 20.00     Pack years: 20.00     Types: Cigarettes     Start date:      Quit date:      Years since quittin.4    Smokeless tobacco: Former     Quit date: 1995   Substance and Sexual Activity    Alcohol use: Yes     Comment: wine on rare occasions     Drug use: No   Other Topics Concern    Sleep Concern No    Stress Concern No    Exercise Yes     Comment: on occasion       Review of Systems:  Skin:  not assessed lumps or bumps back of neck   Eyes:  not assessed glasses;contacts    ENT:  not assessed      Respiratory:  Negative dyspnea on exertion;shortness of breath     Cardiovascular:  Negative;palpitations;chest pain;lightheadedness;dizziness;fatigue      Gastroenterology: Positive for heartburn;nausea treated heartburn,   "nausea  Genitourinary:  not assessed      Musculoskeletal:  not assessed      Neurologic:  Positive for numbness or tingling of hands left arm pain  Psychiatric:  Negative      Heme/Lymph/Imm:  Positive for allergies seasonal  Endocrine:  Negative thyroid disorder;diabetes      Physical Exam:  Vitals: /77   Pulse 63   Ht 1.626 m (5' 4\")   Wt 98.9 kg (218 lb)   SpO2 97%   BMI 37.42 kg/m      Constitutional:  cooperative;in no acute distress        Skin:  warm and dry to the touch          Head:  normocephalic        Eyes:  conjunctivae and lids unremarkable        Lymph:      ENT:  no pallor or cyanosis        Neck:  JVP normal;no carotid bruit        Respiratory:  clear to auscultation         Cardiac: regular rhythm;no murmurs, gallops or rubs detected                pulses full and equal                                        GI:  abdomen soft;non-tender;no bruits        Extremities and Muscular Skeletal:  no edema              Neurological:  no gross motor deficits        Psych:  Alert and Oriented x 3        CC  Mica Tobias MD  [address]  [place] [address]          Thank you for allowing me to participate in the care of your patient.      Sincerely,     Mike Taveras MD, MD     [facility]  "

## 2023-06-05 NOTE — PROGRESS NOTES
REASON FOR CONSULTATION: Renal artery FMD and renal vascular hypertension    HISTORY OF PRESENT ILLNESS: I had the pleasure of seeing Glenis Ni at the [facility] cardiovascular clinic anytime in [place] this afternoon.  She has known coronary disease and has prior LAD stenting.  She also has longstanding hypertension, known renal artery fibromuscular dysplasia status post prior renal angioplasties.  She was recently seen in the clinic and was noted to have elevated blood pressures.  She underwent renal artery ultrasound which I personally reviewed.  The ultrasound revealed elevated velocities in the mid to distal right renal artery suggesting greater than 60% stenosis.  The left renal artery velocities were within the normal range.    The patient had renal artery angiogram in 2011 and had angioplasty of both renal arteries to treat her stenosis caused by FMD.  Her blood pressure has been reasonably well controlled since then.  Her other last blood pressure in the office was elevated, today's blood pressure is excellent.  Specifically her systolic blood pressure is 117.  She also reports excellent blood pressure control at home.    ASSESSMENT AND PLAN: A very pleasant 57-year-old female with renal artery FMD and prior angioplasty who is here for further evaluation.  I reviewed the ultrasound findings with her.  I also reviewed her prior angiogram.  We discussed indications for repeat angioplasty.  At this point, given good blood pressure control, repeat angiogram/ angioplasty is not indicated.  However, I instructed her to keep an eye on her blood pressure and to let me know if her blood pressure is persistently above 130 systolic.    I will see her in approximately 1 year with repeat ultrasound for longitudinal follow-up.    I appreciate the opportunity to participate in her care    Mike Taveras MD    Today's clinic visit entailed:  45 minutes spent by me on the date of the encounter doing chart review,  history and exam, documentation and further activities per the note  Provider  Link to MDM Help Grid       Orders Placed This Encounter   Procedures     Follow-Up with Cardiology       Orders Placed This Encounter   Medications     UNABLE TO FIND     Sig: MEDICATION NAME: Sea young       There are no discontinued medications.      Encounter Diagnosis   Name Primary?     Renal artery stenosis (H)        CURRENT MEDICATIONS:  Current Outpatient Medications   Medication Sig Dispense Refill     albuterol (PROAIR HFA/PROVENTIL HFA/VENTOLIN HFA) 108 (90 Base) MCG/ACT inhaler Inhale 1-2 puffs into the lungs every 4 hours as needed for shortness of breath / dyspnea or wheezing       amLODIPine (NORVASC) 10 MG tablet Take 1 tablet (10 mg) by mouth daily 90 tablet 3     aspirin 81 MG EC tablet Take 1 tablet (81 mg) by mouth daily 90 tablet 3     ezetimibe (ZETIA) 10 MG tablet Take 1 tablet (10 mg) by mouth daily 90 tablet 3     losartan (COZAAR) 100 MG tablet Take 1 tablet (100 mg) by mouth daily 90 tablet 3     metoprolol succinate ER (TOPROL XL) 25 MG 24 hr tablet Take 1 tablet (25 mg) by mouth daily 90 tablet 3     nitroGLYcerin (NITROSTAT) 0.4 MG sublingual tablet Place 1 tablet (0.4 mg) under the tongue every 5 minutes as needed for chest pain 25 tablet 3     omeprazole (PRILOSEC) 20 MG DR capsule Take 20 mg by mouth daily       rosuvastatin (CRESTOR) 40 MG tablet Take 1 tablet (40 mg) by mouth daily 90 tablet 3     UNABLE TO FIND MEDICATION NAME: Sea young         ALLERGIES     Allergies   Allergen Reactions     Imdur [Isosorbide Nitrate] Other (See Comments)     Headache.  Patient took 30mg dose for 2 days and had HA on consecutive days.     Morphine Sulfate Nausea and Vomiting     Penicillins Itching       PAST MEDICAL HISTORY:  Past Medical History:   Diagnosis Date     Asthma      Coronary artery disease     cardiac cath 2011: JUDY to LAD and 1st diagonal     Fibromuscular dysplasia (H)      GERD (gastroesophageal  reflux disease)      Hyperlipidemia LDL goal <70      Hypertension      MI (myocardial infarction) (H)      Peripheral vascular disease (H)     angioplasty bilat renal arteries     Renal artery stenosis (H)     Bilateral moderate renal stenosis noted on 13 renal ultrasound     Sleep apnea     mild per sleep study [facility] 7-10-15       PAST SURGICAL HISTORY:  Past Surgical History:   Procedure Laterality Date     COLONOSCOPY N/A 2015    Procedure: COLONOSCOPY;  Surgeon: Juan Miguel Garcia MD;  Location:  GI     GYN SURGERY       HYSTERECTOMY      partial       FAMILY HISTORY:  Family History   Problem Relation Age of Onset     Heart Disease Mother      Heart Disease Sister        SOCIAL HISTORY:  Social History     Socioeconomic History     Marital status: Single     Spouse name: None     Number of children: None     Years of education: None     Highest education level: None   Tobacco Use     Smoking status: Former     Packs/day: 1.00     Years: 20.00     Pack years: 20.00     Types: Cigarettes     Start date:      Quit date:      Years since quittin.4     Smokeless tobacco: Former     Quit date: 1995   Substance and Sexual Activity     Alcohol use: Yes     Comment: wine on rare occasions      Drug use: No   Other Topics Concern     Sleep Concern No     Stress Concern No     Exercise Yes     Comment: on occasion       Review of Systems:  Skin:  not assessed lumps or bumps back of neck   Eyes:  not assessed glasses;contacts    ENT:  not assessed      Respiratory:  Negative dyspnea on exertion;shortness of breath     Cardiovascular:  Negative;palpitations;chest pain;lightheadedness;dizziness;fatigue      Gastroenterology: Positive for heartburn;nausea treated heartburn,  nausea  Genitourinary:  not assessed      Musculoskeletal:  not assessed      Neurologic:  Positive for numbness or tingling of hands left arm pain  Psychiatric:  Negative      Heme/Lymph/Imm:   "Positive for allergies seasonal  Endocrine:  Negative thyroid disorder;diabetes      Physical Exam:  Vitals: /77   Pulse 63   Ht 1.626 m (5' 4\")   Wt 98.9 kg (218 lb)   SpO2 97%   BMI 37.42 kg/m      Constitutional:  cooperative;in no acute distress        Skin:  warm and dry to the touch          Head:  normocephalic        Eyes:  conjunctivae and lids unremarkable        Lymph:      ENT:  no pallor or cyanosis        Neck:  JVP normal;no carotid bruit        Respiratory:  clear to auscultation         Cardiac: regular rhythm;no murmurs, gallops or rubs detected                pulses full and equal                                        GI:  abdomen soft;non-tender;no bruits        Extremities and Muscular Skeletal:  no edema              Neurological:  no gross motor deficits        Psych:  Alert and Oriented x 3        CC  Mica Tobias MD  [id number]  [place] [id number]              "

## 2023-06-07 ENCOUNTER — TELEPHONE (OUTPATIENT)
Dept: CARDIOLOGY | Facility: CLINIC | Age: 58
End: 2023-06-07

## 2023-06-07 ENCOUNTER — HOSPITAL ENCOUNTER (OUTPATIENT)
Dept: CARDIOLOGY | Facility: CLINIC | Age: 58
Discharge: HOME | End: 2023-06-07
Attending: INTERNAL MEDICINE | Admitting: INTERNAL MEDICINE
Payer: COMMERCIAL

## 2023-06-07 DIAGNOSIS — I25.10 CORONARY ARTERY DISEASE INVOLVING NATIVE CORONARY ARTERY OF NATIVE HEART WITHOUT ANGINA PECTORIS: ICD-10-CM

## 2023-06-07 LAB — LVEF ECHO: NORMAL

## 2023-06-07 PROCEDURE — 93306 TTE W/DOPPLER COMPLETE: CPT | Mod: 26 | Performed by: INTERNAL MEDICINE

## 2023-06-07 PROCEDURE — 93306 TTE W/DOPPLER COMPLETE: CPT

## 2023-06-07 NOTE — TELEPHONE ENCOUNTER
Results reviewed and doned by Dr Tobias. Called patient and reviewed report. She has no questions at this time. Lipids are scheduled for August, otherwise plan to follow up in 1 year. Reminded patient to call clinic for SBP's consistently >130 and she will do this.

## 2023-06-07 NOTE — TELEPHONE ENCOUNTER
Echo 6/7/2023 noted. Ordered for follow up of elevated BP.  Lipid recheck scheduled in August (she will increase her exercise efforts)    Echo:  The left ventricle is normal in size.  Left ventricular systolic function is normal.  The visual ejection fraction is 55-60%.  Normal left ventricular wall motion  The aortic valve is trileaflet with aortic valve sclerosis.  No hemodynamically significant valvular aortic stenosis.  Compared to prior study, there is no significant change.    Per Dr. Taveras's dictation 6/5/2023:  At this point, given good blood pressure control, repeat angiogram/ angioplasty is not indicated.  However, I instructed her to keep an eye on her blood pressure and to let me know if her blood pressure is persistently above 130 systolic.    Per Dr. Tobias's dictation 5/8/2023:  I am concerned about her elevated blood pressure readings today.  I have asked her to keep track of this at home and we will schedule repeat renal ultrasound as well as an echocardiogram for further evaluation.  If her blood pressure remains elevated at home and her renal artery velocities are increased, I will discuss further steps in the vascular colleagues.    Will message Dr. Tobias to review

## 2024-05-06 ENCOUNTER — OFFICE VISIT (OUTPATIENT)
Dept: CARDIOLOGY | Facility: CLINIC | Age: 59
End: 2024-05-06
Payer: COMMERCIAL

## 2024-05-06 VITALS
BODY MASS INDEX: 37.34 KG/M2 | OXYGEN SATURATION: 98 % | WEIGHT: 218.7 LBS | HEART RATE: 70 BPM | HEIGHT: 64 IN | SYSTOLIC BLOOD PRESSURE: 160 MMHG | DIASTOLIC BLOOD PRESSURE: 92 MMHG

## 2024-05-06 DIAGNOSIS — I70.1 RENAL ARTERY STENOSIS (H): ICD-10-CM

## 2024-05-06 PROCEDURE — 93000 ELECTROCARDIOGRAM COMPLETE: CPT | Performed by: INTERNAL MEDICINE

## 2024-05-06 PROCEDURE — 99214 OFFICE O/P EST MOD 30 MIN: CPT | Performed by: INTERNAL MEDICINE

## 2024-05-06 NOTE — PROGRESS NOTES
CARDIOLOGY CLINIC CONSULTATION    PRIMARY CARE PHYSICIAN:  Charisse Rodgers    HISTORY OF PRESENT ILLNESS:  I had the pleasure of seeing Glenis Ni at the [facility] cardiovascular clinic anytime in [place] this afternoon.  She has known coronary disease and has prior LAD stenting.  She also has longstanding hypertension, known renal artery fibromuscular dysplasia status post prior renal angioplasties. I saw her last year for initial consultation after ultrasound revealed elevated velocities in the right renal artery.  Her blood pressure was within the normal range at that time therefore I recommended continued medical management.    She has remained stable from cardiac point of view since her last visit. She does not check her blood pressure routinely.  Blood pressure in the office today is elevated at 160/92.       PAST MEDICAL HISTORY:  Past Medical History:   Diagnosis Date    Asthma     Coronary artery disease     cardiac cath 2011: JUDY to LAD and 1st diagonal    Fibromuscular dysplasia (H24)     GERD (gastroesophageal reflux disease)     Hyperlipidemia LDL goal <70     Hypertension     MI (myocardial infarction) (H) 8/11    Peripheral vascular disease (H24) 7/12    angioplasty bilat renal arteries    Renal artery stenosis (H24)     Bilateral moderate renal stenosis noted on 12/20/13 renal ultrasound    Sleep apnea     mild per sleep study [facility] 7-10-15       MEDICATIONS:  Current Outpatient Medications   Medication Sig Dispense Refill    albuterol (PROAIR HFA/PROVENTIL HFA/VENTOLIN HFA) 108 (90 Base) MCG/ACT inhaler Inhale 1-2 puffs into the lungs every 4 hours as needed for shortness of breath / dyspnea or wheezing      amLODIPine (NORVASC) 10 MG tablet Take 1 tablet (10 mg) by mouth daily 90 tablet 3    aspirin 81 MG EC tablet Take 1 tablet (81 mg) by mouth daily 90 tablet 3    losartan (COZAAR) 100 MG tablet Take 1 tablet (100 mg) by mouth daily 90 tablet 3    metoprolol succinate ER  (TOPROL XL) 25 MG 24 hr tablet Take 1 tablet (25 mg) by mouth daily 90 tablet 3    nitroGLYcerin (NITROSTAT) 0.4 MG sublingual tablet Place 1 tablet (0.4 mg) under the tongue every 5 minutes as needed for chest pain 25 tablet 3    omeprazole (PRILOSEC) 20 MG DR capsule Take 20 mg by mouth daily      rosuvastatin (CRESTOR) 40 MG tablet Take 1 tablet (40 mg) by mouth daily 90 tablet 3    UNABLE TO FIND MEDICATION NAME: Sea young      ezetimibe (ZETIA) 10 MG tablet Take 1 tablet (10 mg) by mouth daily (Patient not taking: Reported on 5/6/2024) 90 tablet 3     No current facility-administered medications for this visit.       SOCIAL HISTORY:  I have reviewed this patient's social history and updated it with pertinent information if needed. Glenis Ni  reports that she quit smoking about 34 years ago. Her smoking use included cigarettes. She started smoking about 54 years ago. She has a 20 pack-year smoking history. She quit smokeless tobacco use about 28 years ago. She reports current alcohol use. She reports that she does not use drugs.    PHYSICAL EXAM:  Pulse:  [70] 70  BP: (160)/(92) 160/92  SpO2:  [98 %] 98 %  218 lbs 11.2 oz    Constitutional: alert, no distress  Respiratory: Good bilateral air entry  Cardiovascular: Regular rate and rhythm, no murmur  GI: nondistended  Neuropsychiatric: appropriate affact    ASSESSMENT: Pertinent issues addressed/ reviewed during this cardiology visit  Renovascular hypertension  Known bilateral renal FMD status post bilateral angioplasty in 2011  Known coronary disease status post prior LAD stenting  Sleep apnea  Obesity    RECOMMENDATIONS:  Her blood pressure is elevated in the office today.  She does not check blood pressure at home and therefore it is unclear at this point whether blood pressure is truly elevated.  I have asked her to record her blood pressure at home in the next 1 to 2 weeks.  If her blood pressure is persistently elevated, then we would recommend  angiogram and potentially repeat angioplasty given known FMD and elevated velocities.    Follow-up in 3 months    It was a pleasure seeing this patient in clinic today. Please do not hesitate to contact me with any future questions.     Mike Taveras MD, [facility]  Cardiology - [facility]  May 6, 2024    Review of the result(s) of each unique test - Last ECG, BMP, lipid profile     The level of medical decision making during this visit was of moderate complexity.    This note was completed in part using dictation via the Dragon voice recognition software. Some word and grammatical errors may occur and must be interpreted in the appropriate clinical context.  If there are any questions pertaining to this issue, please contact me for further clarification.

## 2024-05-06 NOTE — LETTER
5/6/2024    DO [facility] [address] [place] [address]    RE: Glenis Ni       Dear Colleague,     I had the pleasure of seeing Glenis Ni in the [facility].  CARDIOLOGY CLINIC CONSULTATION    PRIMARY CARE PHYSICIAN:  Charisse Rodgers    HISTORY OF PRESENT ILLNESS:  I had the pleasure of seeing Glenis Ni at the [facility] cardiovascular clinic anytime in [place] this afternoon.  She has known coronary disease and has prior LAD stenting.  She also has longstanding hypertension, known renal artery fibromuscular dysplasia status post prior renal angioplasties. I saw her last year for initial consultation after ultrasound revealed elevated velocities in the right renal artery.  Her blood pressure was within the normal range at that time therefore I recommended continued medical management.    She has remained stable from cardiac point of view since her last visit. She does not check her blood pressure routinely.  Blood pressure in the office today is elevated at 160/92.       PAST MEDICAL HISTORY:  Past Medical History:   Diagnosis Date    Asthma     Coronary artery disease     cardiac cath 2011: JUDY to LAD and 1st diagonal    Fibromuscular dysplasia (H24)     GERD (gastroesophageal reflux disease)     Hyperlipidemia LDL goal <70     Hypertension     MI (myocardial infarction) (H) 8/11    Peripheral vascular disease (H24) 7/12    angioplasty bilat renal arteries    Renal artery stenosis (H24)     Bilateral moderate renal stenosis noted on 12/20/13 renal ultrasound    Sleep apnea     mild per sleep study [facility] 7-10-15       MEDICATIONS:  Current Outpatient Medications   Medication Sig Dispense Refill    albuterol (PROAIR HFA/PROVENTIL HFA/VENTOLIN HFA) 108 (90 Base) MCG/ACT inhaler Inhale 1-2 puffs into the lungs every 4 hours as needed for shortness of breath / dyspnea or wheezing      amLODIPine (NORVASC) 10 MG tablet  Take 1 tablet (10 mg) by mouth daily 90 tablet 3    aspirin 81 MG EC tablet Take 1 tablet (81 mg) by mouth daily 90 tablet 3    losartan (COZAAR) 100 MG tablet Take 1 tablet (100 mg) by mouth daily 90 tablet 3    metoprolol succinate ER (TOPROL XL) 25 MG 24 hr tablet Take 1 tablet (25 mg) by mouth daily 90 tablet 3    nitroGLYcerin (NITROSTAT) 0.4 MG sublingual tablet Place 1 tablet (0.4 mg) under the tongue every 5 minutes as needed for chest pain 25 tablet 3    omeprazole (PRILOSEC) 20 MG DR capsule Take 20 mg by mouth daily      rosuvastatin (CRESTOR) 40 MG tablet Take 1 tablet (40 mg) by mouth daily 90 tablet 3    UNABLE TO FIND MEDICATION NAME: Sea young      ezetimibe (ZETIA) 10 MG tablet Take 1 tablet (10 mg) by mouth daily (Patient not taking: Reported on 5/6/2024) 90 tablet 3     No current facility-administered medications for this visit.       SOCIAL HISTORY:  I have reviewed this patient's social history and updated it with pertinent information if needed. Glenis Ni  reports that she quit smoking about 34 years ago. Her smoking use included cigarettes. She started smoking about 54 years ago. She has a 20 pack-year smoking history. She quit smokeless tobacco use about 28 years ago. She reports current alcohol use. She reports that she does not use drugs.    PHYSICAL EXAM:  Pulse:  [70] 70  BP: (160)/(92) 160/92  SpO2:  [98 %] 98 %  218 lbs 11.2 oz    Constitutional: alert, no distress  Respiratory: Good bilateral air entry  Cardiovascular: Regular rate and rhythm, no murmur  GI: nondistended  Neuropsychiatric: appropriate affact    ASSESSMENT: Pertinent issues addressed/ reviewed during this cardiology visit  Renovascular hypertension  Known bilateral renal FMD status post bilateral angioplasty in 2011  Known coronary disease status post prior LAD stenting  Sleep apnea  Obesity    RECOMMENDATIONS:  Her blood pressure is elevated in the office today.  She does not check blood pressure at home and  therefore it is unclear at this point whether blood pressure is truly elevated.  I have asked her to record her blood pressure at home in the next 1 to 2 weeks.  If her blood pressure is persistently elevated, then we would recommend angiogram and potentially repeat angioplasty given known FMD and elevated velocities.    Follow-up in 3 months    It was a pleasure seeing this patient in clinic today. Please do not hesitate to contact me with any future questions.     Mike Taveras MD, [facility]  Cardiology - [facility] Heart  May 6, 2024    Review of the result(s) of each unique test - Last ECG, BMP, lipid profile     The level of medical decision making during this visit was of moderate complexity.    This note was completed in part using dictation via the Dragon voice recognition software. Some word and grammatical errors may occur and must be interpreted in the appropriate clinical context.  If there are any questions pertaining to this issue, please contact me for further clarification.    Thank you for allowing me to participate in the care of your patient.      Sincerely,     Mike Taveras MD, MD     [facility]  cc:   Mike Taveras MD  [id number] [address]

## 2024-06-19 DIAGNOSIS — E66.01 MORBID OBESITY (H): ICD-10-CM

## 2024-06-19 DIAGNOSIS — I77.3 FIBROMUSCULAR DYSPLASIA (H): ICD-10-CM

## 2024-06-19 DIAGNOSIS — I25.10 CORONARY ARTERY DISEASE INVOLVING NATIVE CORONARY ARTERY OF NATIVE HEART WITHOUT ANGINA PECTORIS: ICD-10-CM

## 2024-06-19 DIAGNOSIS — I70.1 RENAL ARTERY STENOSIS (H): ICD-10-CM

## 2024-06-19 DIAGNOSIS — I10 BENIGN ESSENTIAL HYPERTENSION: ICD-10-CM

## 2024-06-19 DIAGNOSIS — E78.2 MIXED HYPERLIPIDEMIA: ICD-10-CM

## 2024-06-19 DIAGNOSIS — I25.110 CORONARY ARTERY DISEASE INVOLVING NATIVE CORONARY ARTERY OF NATIVE HEART WITH UNSTABLE ANGINA PECTORIS (H): ICD-10-CM

## 2024-06-19 RX ORDER — METOPROLOL SUCCINATE 25 MG/1
25 TABLET, EXTENDED RELEASE ORAL DAILY
Qty: 90 TABLET | Refills: 4 | Status: SHIPPED | OUTPATIENT
Start: 2024-06-19

## 2024-06-25 DIAGNOSIS — I25.10 CORONARY ARTERY DISEASE INVOLVING NATIVE CORONARY ARTERY OF NATIVE HEART WITHOUT ANGINA PECTORIS: ICD-10-CM

## 2024-06-25 RX ORDER — ASPIRIN 81 MG/1
81 TABLET ORAL DAILY
Qty: 90 TABLET | Refills: 3 | Status: SHIPPED | OUTPATIENT
Start: 2024-06-25

## 2024-07-12 DIAGNOSIS — I10 BENIGN ESSENTIAL HYPERTENSION: ICD-10-CM

## 2024-07-12 RX ORDER — LOSARTAN POTASSIUM 100 MG/1
100 TABLET ORAL DAILY
Qty: 90 TABLET | Refills: 3 | Status: SHIPPED | OUTPATIENT
Start: 2024-07-12

## 2024-08-30 DIAGNOSIS — I10 BENIGN ESSENTIAL HYPERTENSION: ICD-10-CM

## 2024-08-30 DIAGNOSIS — I25.10 CORONARY ARTERY DISEASE INVOLVING NATIVE CORONARY ARTERY OF NATIVE HEART WITHOUT ANGINA PECTORIS: ICD-10-CM

## 2024-08-30 RX ORDER — ROSUVASTATIN CALCIUM 40 MG/1
40 TABLET, COATED ORAL DAILY
Qty: 90 TABLET | Refills: 0 | Status: SHIPPED | OUTPATIENT
Start: 2024-08-30

## 2024-08-30 RX ORDER — AMLODIPINE BESYLATE 10 MG/1
10 TABLET ORAL DAILY
Qty: 90 TABLET | Refills: 0 | Status: SHIPPED | OUTPATIENT
Start: 2024-08-30

## 2024-09-05 DIAGNOSIS — I25.10 CORONARY ARTERY DISEASE INVOLVING NATIVE CORONARY ARTERY OF NATIVE HEART WITHOUT ANGINA PECTORIS: ICD-10-CM

## 2024-09-05 RX ORDER — NITROGLYCERIN 0.4 MG/1
0.4 TABLET SUBLINGUAL EVERY 5 MIN PRN
Qty: 25 TABLET | Refills: 3 | Status: SHIPPED | OUTPATIENT
Start: 2024-09-05

## 2024-10-19 NOTE — PROGRESS NOTES
HPI and Plan:       I had the pleasure of seeing Ms. Ni in followup at the [facility] today.  She is a very pleasant 59 year-old female who I last saw in 01/2021.  She has a history of coronary artery disease and is status post angioplasty and stenting of a 95% LAD lesion in 08/2011.  She also has a history of fibromuscular dysplasia of the renal arteries and has undergone PTCA in the past.      Her last coronary angiogram was in 06/2014 at which point no significant obstructive disease was found. She has also undergone a workup for thoracic outlet syndrome which has been unrevealing.     Prior to her last office visit she had been complaining of some chest discomfort and a subsequent stress perfusion study in October 2019 was negative for ischemia.  She had also undergone a renal artery ultrasound in December 2019 that did demonstrate elevated velocities in the right renal artery.  I discussed this with Dr. Taveras, who recommended continued medical therapy given that her blood pressure was well controlled.    She has subsequently seen Dr. Taveras most recently in May of this year.  At that time blood pressure was elevated but given that this was an isolated reading documentation of 1 to 2 weeks of blood pressure recordings was recommended prior to potentially proceeding with repeat renal angiogram/angioplasty.  Of note, repeat ultrasonography of the renal arteries did demonstrate increased velocities in the right renal artery.  This study was performed on 5/23/2023.    Today she presents with a few concerns.  She has noted exertional dyspnea over the past couple of weeks which occurs with relatively little exertion.  She denies any chest discomfort, although she did have some back discomfort yesterday and actually took a nitroglycerin with some improvement.  There is no history of syncope or presyncope.    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  She has not been checking her  blood pressure at home on a regular basis but the 2 readings we have from this year have been elevated including her presenting blood pressure today.    Her physical exam is as dictated below.      Her last echocardiogram in June 2023 demonstrated normal biventricular systolic function with no significant valvular disease.     IMPRESSION:     1.  Coronary artery disease, status post drug-eluting stent placement to mid LAD in 2011.  Her most recent stress perfusion study from 2019 was negative for ischemia.    2.  History of renal fibromuscular dysplasia, status bilateral balloon angioplasty in December 2011..  She was noted to have elevated velocities in the right renal artery on an ultrasound most recently performed in May 2023.  3.  Hypertension.  Blood pressure elevated today on her current regimen of amlodipine 10 mg daily, losartan 100 mg daily, Toprol-XL 25 mg daily.  4.  Dyslipidemia.   Previously well controlled on rosuvastatin 40 mg daily.     PLAN    Ms. Ni presents today with a few concerns, specifically exertional dyspnea which is a relatively new development.  I have ordered an exercise nuclear stress perfusion study to rule out progressive obstructive coronary artery disease.    Regarding her dyslipidemia, she is not taking ezetimibe for unclear reasons.  I have refilled this prescription and explained the rationale behind additive lipid-lowering therapy.  I have also added Aldactone 12.5 mg daily for more effective blood pressure lowering.    If her blood pressure remains elevated, I will probably refer her back to Dr. Taveras for consideration of renal artery angioplasty.    It was a pleasure seeing her in follow-up today.    CURRENT MEDICATIONS:  Current Outpatient Medications   Medication Sig Dispense Refill    albuterol (PROAIR HFA/PROVENTIL HFA/VENTOLIN HFA) 108 (90 Base) MCG/ACT inhaler Inhale 1-2 puffs into the lungs every 4 hours as needed for shortness of breath / dyspnea or wheezing       amLODIPine (NORVASC) 10 MG tablet Take 1 tablet (10 mg) by mouth daily. 90 tablet 0    aspirin 81 MG EC tablet Take 1 tablet (81 mg) by mouth daily 90 tablet 3    ezetimibe (ZETIA) 10 MG tablet Take 1 tablet (10 mg) by mouth daily (Patient not taking: Reported on 5/6/2024) 90 tablet 3    losartan (COZAAR) 100 MG tablet Take 1 tablet (100 mg) by mouth daily 90 tablet 3    metoprolol succinate ER (TOPROL XL) 25 MG 24 hr tablet Take 1 tablet (25 mg) by mouth daily 90 tablet 4    nitroGLYcerin (NITROSTAT) 0.4 MG sublingual tablet Place 1 tablet (0.4 mg) under the tongue every 5 minutes as needed for chest pain. 25 tablet 3    omeprazole (PRILOSEC) 20 MG DR capsule Take 20 mg by mouth daily      rosuvastatin (CRESTOR) 40 MG tablet Take 1 tablet (40 mg) by mouth daily. 90 tablet 0    UNABLE TO FIND MEDICATION NAME: Sea young         ALLERGIES     Allergies   Allergen Reactions    Imdur [Isosorbide Nitrate] Other (See Comments)     Headache.  Patient took 30mg dose for 2 days and had HA on consecutive days.    Morphine Sulfate Nausea and Vomiting    Penicillins Itching       PAST MEDICAL HISTORY:  Past Medical History:   Diagnosis Date    Asthma     Coronary artery disease     cardiac cath 2011: JUDY to LAD and 1st diagonal    Fibromuscular dysplasia (H)     GERD (gastroesophageal reflux disease)     Hyperlipidemia LDL goal <70     Hypertension     MI (myocardial infarction) (H) 8/11    Peripheral vascular disease (H) 7/12    angioplasty bilat renal arteries    Renal artery stenosis (H)     Bilateral moderate renal stenosis noted on 12/20/13 renal ultrasound    Sleep apnea     mild per sleep study [facility] 7-10-15       PAST SURGICAL HISTORY:  Past Surgical History:   Procedure Laterality Date    COLONOSCOPY N/A 1/13/2015    Procedure: COLONOSCOPY;  Surgeon: Juan Miguel Garcia MD;  Location:  GI    GYN SURGERY      HYSTERECTOMY      partial       FAMILY HISTORY:  Family History   Problem Relation Age of Onset     Heart Disease Mother     Heart Disease Sister        SOCIAL HISTORY:  Social History     Socioeconomic History    Marital status: Single   Tobacco Use    Smoking status: Former     Current packs/day: 0.00     Average packs/day: 1 pack/day for 20.0 years (20.0 ttl pk-yrs)     Types: Cigarettes     Start date:      Quit date:      Years since quittin.8    Smokeless tobacco: Former     Quit date: 1995   Substance and Sexual Activity    Alcohol use: Yes     Comment: wine on rare occasions     Drug use: No   Other Topics Concern    Sleep Concern No    Stress Concern No    Exercise Yes     Comment: on occasion       Review of Systems:  Skin:          Eyes:         ENT:         Respiratory:          Cardiovascular:         Gastroenterology:        Genitourinary:         Musculoskeletal:         Neurologic:         Psychiatric:         Heme/Lymph/Imm:         Endocrine:           Physical Exam:  Vitals: There were no vitals taken for this visit.    Constitutional:           Skin:             Head:           Eyes:           Lymph:      ENT:           Neck:           Respiratory:        Clear to auscultation    Cardiac:           Regular rate and rhythm                                                GI:           Extremities and Muscular Skeletal:                 Neurological:           Psych:           CC  Mica Tobias MD  [id number]  [place] [id number]

## 2024-10-23 ENCOUNTER — OFFICE VISIT (OUTPATIENT)
Dept: CARDIOLOGY | Facility: CLINIC | Age: 59
End: 2024-10-23
Payer: COMMERCIAL

## 2024-10-23 VITALS
DIASTOLIC BLOOD PRESSURE: 93 MMHG | WEIGHT: 217.9 LBS | SYSTOLIC BLOOD PRESSURE: 151 MMHG | BODY MASS INDEX: 37.2 KG/M2 | HEART RATE: 71 BPM | HEIGHT: 64 IN | OXYGEN SATURATION: 98 %

## 2024-10-23 DIAGNOSIS — I70.1 RENAL ARTERY STENOSIS (H): ICD-10-CM

## 2024-10-23 DIAGNOSIS — I25.110 CORONARY ARTERY DISEASE INVOLVING NATIVE CORONARY ARTERY OF NATIVE HEART WITH UNSTABLE ANGINA PECTORIS (H): ICD-10-CM

## 2024-10-23 PROCEDURE — 99214 OFFICE O/P EST MOD 30 MIN: CPT | Performed by: INTERNAL MEDICINE

## 2024-10-23 RX ORDER — EZETIMIBE 10 MG/1
10 TABLET ORAL DAILY
Qty: 90 TABLET | Refills: 3 | Status: SHIPPED | OUTPATIENT
Start: 2024-10-23

## 2024-10-23 RX ORDER — SPIRONOLACTONE 25 MG/1
12.5 TABLET ORAL DAILY
Qty: 45 TABLET | Refills: 3 | Status: SHIPPED | OUTPATIENT
Start: 2024-10-23 | End: 2024-11-05

## 2024-10-23 NOTE — LETTER
10/23/2024    Charisse Stasdesmondsagar   [facility] [address] [place] [address]    RE: Glenis Ni       Dear Colleague,     I had the pleasure of seeing Glenis Ni in the [facility].  HPI and Plan:       I had the pleasure of seeing Ms. Ni in followup at the [facility] today.  She is a very pleasant 59 year-old female who I last saw in 01/2021.  She has a history of coronary artery disease and is status post angioplasty and stenting of a 95% LAD lesion in 08/2011.  She also has a history of fibromuscular dysplasia of the renal arteries and has undergone PTCA in the past.      Her last coronary angiogram was in 06/2014 at which point no significant obstructive disease was found. She has also undergone a workup for thoracic outlet syndrome which has been unrevealing.     Prior to her last office visit she had been complaining of some chest discomfort and a subsequent stress perfusion study in October 2019 was negative for ischemia.  She had also undergone a renal artery ultrasound in December 2019 that did demonstrate elevated velocities in the right renal artery.  I discussed this with Dr. Taveras, who recommended continued medical therapy given that her blood pressure was well controlled.    She has subsequently seen Dr. Taveras most recently in May of this year.  At that time blood pressure was elevated but given that this was an isolated reading documentation of 1 to 2 weeks of blood pressure recordings was recommended prior to potentially proceeding with repeat renal angiogram/angioplasty.  Of note, repeat ultrasonography of the renal arteries did demonstrate increased velocities in the right renal artery.  This study was performed on 5/23/2023.    Today she presents with a few concerns.  She has noted exertional dyspnea over the past couple of weeks which occurs with relatively little exertion.  She denies any chest discomfort,  although she did have some back discomfort yesterday and actually took a nitroglycerin with some improvement.  There is no history of syncope or presyncope.    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  She has not been checking her blood pressure at home on a regular basis but the 2 readings we have from this year have been elevated including her presenting blood pressure today.    Her physical exam is as dictated below.      Her last echocardiogram in June 2023 demonstrated normal biventricular systolic function with no significant valvular disease.     IMPRESSION:     1.  Coronary artery disease, status post drug-eluting stent placement to mid LAD in 2011.  Her most recent stress perfusion study from 2019 was negative for ischemia.    2.  History of renal fibromuscular dysplasia, status bilateral balloon angioplasty in December 2011..  She was noted to have elevated velocities in the right renal artery on an ultrasound most recently performed in May 2023.  3.  Hypertension.  Blood pressure elevated today on her current regimen of amlodipine 10 mg daily, losartan 100 mg daily, Toprol-XL 25 mg daily.  4.  Dyslipidemia.   Previously well controlled on rosuvastatin 40 mg daily.     PLAN    Ms. Ni presents today with a few concerns, specifically exertional dyspnea which is a relatively new development.  I have ordered an exercise nuclear stress perfusion study to rule out progressive obstructive coronary artery disease.    Regarding her dyslipidemia, she is not taking ezetimibe for unclear reasons.  I have refilled this prescription and explained the rationale behind additive lipid-lowering therapy.  I have also added Aldactone 12.5 mg daily for more effective blood pressure lowering.    If her blood pressure remains elevated, I will probably refer her back to Dr. Taveras for consideration of renal artery angioplasty.    It was a pleasure seeing her in follow-up today.    CURRENT MEDICATIONS:  Current Outpatient  Medications   Medication Sig Dispense Refill     albuterol (PROAIR HFA/PROVENTIL HFA/VENTOLIN HFA) 108 (90 Base) MCG/ACT inhaler Inhale 1-2 puffs into the lungs every 4 hours as needed for shortness of breath / dyspnea or wheezing       amLODIPine (NORVASC) 10 MG tablet Take 1 tablet (10 mg) by mouth daily. 90 tablet 0     aspirin 81 MG EC tablet Take 1 tablet (81 mg) by mouth daily 90 tablet 3     ezetimibe (ZETIA) 10 MG tablet Take 1 tablet (10 mg) by mouth daily (Patient not taking: Reported on 5/6/2024) 90 tablet 3     losartan (COZAAR) 100 MG tablet Take 1 tablet (100 mg) by mouth daily 90 tablet 3     metoprolol succinate ER (TOPROL XL) 25 MG 24 hr tablet Take 1 tablet (25 mg) by mouth daily 90 tablet 4     nitroGLYcerin (NITROSTAT) 0.4 MG sublingual tablet Place 1 tablet (0.4 mg) under the tongue every 5 minutes as needed for chest pain. 25 tablet 3     omeprazole (PRILOSEC) 20 MG DR capsule Take 20 mg by mouth daily       rosuvastatin (CRESTOR) 40 MG tablet Take 1 tablet (40 mg) by mouth daily. 90 tablet 0     UNABLE TO FIND MEDICATION NAME: Sea young         ALLERGIES     Allergies   Allergen Reactions     Imdur [Isosorbide Nitrate] Other (See Comments)     Headache.  Patient took 30mg dose for 2 days and had HA on consecutive days.     Morphine Sulfate Nausea and Vomiting     Penicillins Itching       PAST MEDICAL HISTORY:  Past Medical History:   Diagnosis Date     Asthma      Coronary artery disease     cardiac cath 2011: JUDY to LAD and 1st diagonal     Fibromuscular dysplasia (H)      GERD (gastroesophageal reflux disease)      Hyperlipidemia LDL goal <70      Hypertension      MI (myocardial infarction) (H) 8/11     Peripheral vascular disease (H) 7/12    angioplasty bilat renal arteries     Renal artery stenosis (H)     Bilateral moderate renal stenosis noted on 12/20/13 renal ultrasound     Sleep apnea     mild per sleep study [facility] 7-10-15       PAST SURGICAL HISTORY:  Past Surgical History:    Procedure Laterality Date     COLONOSCOPY N/A 2015    Procedure: COLONOSCOPY;  Surgeon: Juan Miguel Garcia MD;  Location:  GI     GYN SURGERY       HYSTERECTOMY      partial       FAMILY HISTORY:  Family History   Problem Relation Age of Onset     Heart Disease Mother      Heart Disease Sister        SOCIAL HISTORY:  Social History     Socioeconomic History     Marital status: Single   Tobacco Use     Smoking status: Former     Current packs/day: 0.00     Average packs/day: 1 pack/day for 20.0 years (20.0 ttl pk-yrs)     Types: Cigarettes     Start date:      Quit date:      Years since quittin.8     Smokeless tobacco: Former     Quit date: 1995   Substance and Sexual Activity     Alcohol use: Yes     Comment: wine on rare occasions      Drug use: No   Other Topics Concern     Sleep Concern No     Stress Concern No     Exercise Yes     Comment: on occasion       Review of Systems:  Skin:          Eyes:         ENT:         Respiratory:          Cardiovascular:         Gastroenterology:        Genitourinary:         Musculoskeletal:         Neurologic:         Psychiatric:         Heme/Lymph/Imm:         Endocrine:           Physical Exam:  Vitals: There were no vitals taken for this visit.    Constitutional:           Skin:             Head:           Eyes:           Lymph:      ENT:           Neck:           Respiratory:        Clear to auscultation    Cardiac:           Regular rate and rhythm                                                GI:           Extremities and Muscular Skeletal:                 Neurological:           Psych:           CC  Mica Tobias MD  [address]  [place] [address]                  Thank you for allowing me to participate in the care of your patient.      Sincerely,     Mica Tobias MD     [facility]  cc:   Mica Tobias MD  [address]  [place]  [address]

## 2024-10-31 ENCOUNTER — TELEPHONE (OUTPATIENT)
Dept: CARDIOLOGY | Facility: CLINIC | Age: 59
End: 2024-10-31

## 2024-10-31 ENCOUNTER — LAB (OUTPATIENT)
Dept: LAB | Facility: CLINIC | Age: 59
End: 2024-10-31
Payer: COMMERCIAL

## 2024-10-31 DIAGNOSIS — I10 BENIGN ESSENTIAL HYPERTENSION: Primary | ICD-10-CM

## 2024-10-31 DIAGNOSIS — I25.110 CORONARY ARTERY DISEASE INVOLVING NATIVE CORONARY ARTERY OF NATIVE HEART WITH UNSTABLE ANGINA PECTORIS (H): ICD-10-CM

## 2024-10-31 LAB
ANION GAP SERPL CALCULATED.3IONS-SCNC: 10 MMOL/L (ref 7–15)
BUN SERPL-MCNC: 11.7 MG/DL (ref 8–23)
CALCIUM SERPL-MCNC: 9.9 MG/DL (ref 8.8–10.4)
CHLORIDE SERPL-SCNC: 104 MMOL/L (ref 98–107)
CREAT SERPL-MCNC: 0.96 MG/DL (ref 0.51–0.95)
EGFRCR SERPLBLD CKD-EPI 2021: 67 ML/MIN/1.73M2
GLUCOSE SERPL-MCNC: 86 MG/DL (ref 70–99)
HCO3 SERPL-SCNC: 29 MMOL/L (ref 22–29)
POTASSIUM SERPL-SCNC: 3.7 MMOL/L (ref 3.4–5.3)
SODIUM SERPL-SCNC: 143 MMOL/L (ref 135–145)

## 2024-10-31 PROCEDURE — 36415 COLL VENOUS BLD VENIPUNCTURE: CPT

## 2024-10-31 PROCEDURE — 80048 BASIC METABOLIC PNL TOTAL CA: CPT

## 2024-10-31 NOTE — TELEPHONE ENCOUNTER
Bmp 10/31/2024 noted. Ordered after starting aldactone.    Nuclear study is scheduled 11/5/2024    Per Dr. Tobias's dictation 10/23/2024: I have also added Aldactone 12.5 mg daily for more effective blood pressure lowering.     10/31/2024 called patient for BP update: awaiting BP update before updating Dr. Tobias      11/1/2024 1140 spoke with patient. She states she never started the aldactone. She states she never picked it up but has the bottle now. She does not have a home BP cuff - it is not working. She checks her BP at the [facility] pharmacy. She has also not been taking her zetia as she thought that was for BP and it gave her a headache.  Reviewed the uses for both medications. While talking, she took a tablet of each. Reviewed that we will need a new bmp next week. She will set that up when she comes in for her stress test.  Called patient back and left a message to be sure she is only taking 12.5mg (1/2 tab) and not the whole pill.  Order placed for bmp in 1 week.    Component      Latest Ref Rng 10/31/2024  12:19 PM   Sodium      135 - 145 mmol/L 143    Potassium      3.4 - 5.3 mmol/L 3.7    Chloride      98 - 107 mmol/L 104    Carbon Dioxide (CO2)      22 - 29 mmol/L 29    Anion Gap      7 - 15 mmol/L 10    Urea Nitrogen      8.0 - 23.0 mg/dL 11.7    Creatinine      0.51 - 0.95 mg/dL 0.96 (H)    GFR Estimate      >60 mL/min/1.73m2 67    Calcium      8.8 - 10.4 mg/dL 9.9    Glucose      70 - 99 mg/dL 86       Will message Dr. Tobias to review

## 2024-11-05 ENCOUNTER — TELEPHONE (OUTPATIENT)
Dept: CARDIOLOGY | Facility: CLINIC | Age: 59
End: 2024-11-05

## 2024-11-05 ENCOUNTER — HOSPITAL ENCOUNTER (OUTPATIENT)
Dept: CARDIOLOGY | Facility: CLINIC | Age: 59
Discharge: HOME | End: 2024-11-05
Attending: INTERNAL MEDICINE
Payer: COMMERCIAL

## 2024-11-05 VITALS
BODY MASS INDEX: 37.36 KG/M2 | OXYGEN SATURATION: 98 % | SYSTOLIC BLOOD PRESSURE: 148 MMHG | HEIGHT: 64 IN | WEIGHT: 218.8 LBS | DIASTOLIC BLOOD PRESSURE: 88 MMHG

## 2024-11-05 DIAGNOSIS — I10 BENIGN ESSENTIAL HYPERTENSION: ICD-10-CM

## 2024-11-05 DIAGNOSIS — I25.10 CORONARY ARTERY DISEASE: ICD-10-CM

## 2024-11-05 DIAGNOSIS — I25.110 CORONARY ARTERY DISEASE INVOLVING NATIVE CORONARY ARTERY OF NATIVE HEART WITH UNSTABLE ANGINA PECTORIS (H): ICD-10-CM

## 2024-11-05 DIAGNOSIS — E78.2 MIXED HYPERLIPIDEMIA: ICD-10-CM

## 2024-11-05 DIAGNOSIS — I70.1 RENAL ARTERY STENOSIS (H): ICD-10-CM

## 2024-11-05 DIAGNOSIS — I70.1 RENAL ARTERY STENOSIS (H): Primary | ICD-10-CM

## 2024-11-05 DIAGNOSIS — I25.10 CORONARY ARTERY DISEASE INVOLVING NATIVE CORONARY ARTERY OF NATIVE HEART WITHOUT ANGINA PECTORIS: ICD-10-CM

## 2024-11-05 LAB
CV STRESS MAX HR HE: 144
NUC STRESS EJECTION FRACTION: 65 %
RATE PRESSURE PRODUCT: NORMAL
STRESS ANGINA INDEX: 0
STRESS ECHO BASELINE DIASTOLIC HE: 90
STRESS ECHO BASELINE HR: 83 BPM
STRESS ECHO BASELINE SYSTOLIC BP: 148
STRESS ECHO CALCULATED PERCENT HR: 90 %
STRESS ECHO LAST STRESS DIASTOLIC BP: 82
STRESS ECHO LAST STRESS SYSTOLIC BP: 208
STRESS ECHO POST ESTIMATED WORKLOAD: 10.1 METS
STRESS ECHO POST EXERCISE DUR MIN: 7 MIN
STRESS ECHO POST EXERCISE DUR SEC: 30 SEC
STRESS ECHO TARGET HR: 160

## 2024-11-05 PROCEDURE — 93017 CV STRESS TEST TRACING ONLY: CPT

## 2024-11-05 PROCEDURE — 93018 CV STRESS TEST I&R ONLY: CPT | Performed by: INTERNAL MEDICINE

## 2024-11-05 PROCEDURE — A9502 TC99M TETROFOSMIN: HCPCS | Performed by: INTERNAL MEDICINE

## 2024-11-05 PROCEDURE — 343N000001 HC RX 343 MED OP 636: Performed by: INTERNAL MEDICINE

## 2024-11-05 PROCEDURE — 93016 CV STRESS TEST SUPVJ ONLY: CPT | Performed by: INTERNAL MEDICINE

## 2024-11-05 PROCEDURE — 78452 HT MUSCLE IMAGE SPECT MULT: CPT | Mod: 26 | Performed by: INTERNAL MEDICINE

## 2024-11-05 RX ORDER — SPIRONOLACTONE 25 MG/1
12.5 TABLET ORAL DAILY
COMMUNITY
Start: 2024-11-05

## 2024-11-05 RX ADMIN — TETROFOSMIN 6.27 MILLICURIE: 1.38 INJECTION, POWDER, LYOPHILIZED, FOR SOLUTION INTRAVENOUS at 08:47

## 2024-11-05 RX ADMIN — TETROFOSMIN 18.83 MILLICURIE: 1.38 INJECTION, POWDER, LYOPHILIZED, FOR SOLUTION INTRAVENOUS at 10:35

## 2024-11-05 NOTE — TELEPHONE ENCOUNTER
Stress test routed to Dr Tobias to review, ordered at visit 10/23/24 for evaluation of ZAPIEN.     Pt is due for BMP after starting spironolactone 12.5mg daily, not scheduled.         Above average exercise capacity, no chest pain, no ischemic EKG changes.    The nuclear stress test is negative for inducible myocardial ischemia or infarction.    The left ventricular ejection fraction at stress is 65%.    No changes when directly compared to nuclear stress from 2019.

## 2024-11-05 NOTE — TELEPHONE ENCOUNTER
Patient stopped by after her nuclear study. Reviewed that she is not taking the aldactone at all. Discussed waiting to check a bmp as that was planned for a post-medication review. Will discuss again after her results are back.

## 2024-11-06 NOTE — TELEPHONE ENCOUNTER
Patient called and VM left to inform patient that Dr. Tobias has reviewed stress test and recommendation for KAYLEE follow up in 6 months. Patient also instructed to make lab apt for BMP to be checked after starting spirolactone. Patient instructed to return call to team 2 nurse line to discuss in more detail.

## 2024-11-13 NOTE — TELEPHONE ENCOUNTER
Spoke to patient, reviewed results and recommendations. Scheduling number provided call and schedule BMP.

## 2024-11-26 ENCOUNTER — TELEPHONE (OUTPATIENT)
Dept: CARDIOLOGY | Facility: CLINIC | Age: 59
End: 2024-11-26
Payer: COMMERCIAL

## 2024-11-26 DIAGNOSIS — I70.1 RENAL ARTERY STENOSIS (H): ICD-10-CM

## 2024-11-26 NOTE — TELEPHONE ENCOUNTER
[facility] Call Center    Phone Message    May a detailed message be left on voicemail: yes     Reason for Call: Medication Refill Request    Has the patient contacted the pharmacy for the refill? Yes   Name of medication being requested:   spironolactone (ALDACTONE) 25 MG tablet       Provider who prescribed the medication: ali  Pharmacy: [id number] - [facility] [place] - [address]    Date medication is needed: asap   Pharmacy does not have this medication, please resend, thank you    Action Taken: Other: cardiology     Travel Screening: Not Applicable     Date of Service:

## 2024-11-27 NOTE — TELEPHONE ENCOUNTER
Patient called to assess what dose of spironolactone she is taking. Per Dr. Tobias's LOV note on 10/23/24, dose changed to 12.5 mg daily and needing a BMP after dose change. This was never done. VM left asking patient to please return call to team 2 nurse line to discuss.

## 2024-12-04 RX ORDER — SPIRONOLACTONE 25 MG/1
12.5 TABLET ORAL DAILY
Qty: 45 TABLET | Refills: 3 | Status: SHIPPED | OUTPATIENT
Start: 2024-12-04

## 2024-12-04 NOTE — TELEPHONE ENCOUNTER
Spoke to patient who confirmed she is taking 12.5mg daily. Rx sent to pharmacy. Reminded patient to set up BMP this week and she will do this.

## 2025-01-17 NOTE — PROGRESS NOTES
"  Cardiology Clinic Progress Note    Service Date: February 7, 2022    Primary Cardiologist: Dr. Tobias      Reason for Visit: Annual follow up    HPI:   I had the pleasure of neeting Ms. Glenis Ni in the clinic today. She is a very pleasant 56 year old female with a past medical history notable for hypertension, dyslipidemia, fibromuscular dysplasia of the renal arteries, s/p PTCA, coronary artery disease, status post angioplasty and stenting of a 95% LAD lesion in 08/2011.     Most recent ischemic evaluation was with an exercise nuclear stress test in the setting of some atypical chest discomfort in October 2019 which was negative for ischemia. Left ventricular systolic function was normal with an LVEF of 59%. She had also undergone a renal artery ultrasound in December 2019 that did demonstrate elevated velocities in the right renal artery. Findings were discussed with Dr. Taveras who recommended continued medical therapy given that her blood pressure was well controlled.    The patient was last seen in the clinic by Dr. Tobias in January 2021. She was doing well at that time without concerns from a cardiac standpoint.     Today, Ms. Ni presents to the clinic for a routine annual follow up visit with cardiology. She tells me that she has been feeling generally well over the past year. She continues to exercise regularly and feels that she has been tolerating her usual activity well without exertional symptoms. She did have any an episode of mild non-exertional chest discomfort around a month ago which she describes as \"heaviness.\" This occurred in the setting of having braided hair with her friends the previous day which involved several hours of lifting up her arms so she felt it was likely musculoskeletal in nature as it resolved on its own and has not recurred. She otherwise denies symptoms of palpitations, dizziness, shortness of breath, dyspnea on exertion, or lower extremity edema. She has not been " Addended by: LILIANA MERCADO on: 1/17/2025 09:58 AM     Modules accepted: Orders     checking her blood pressure regularly at home as she has a wrist cuff and is unsure how accurate it is.    Labs were checked on 01/31/21 with a fasting lipid profile showing a total cholesterol of 141, HDL mildly low at 38, LDL at 76, and triglycerides at 135. She is on maximum dose rosuvastatin 40 mg daily with zetia 10 mg once daily. A basic metabolic panel was also completed on 01/31/21 showing borderline low potassium at 3.3 and normal renal function.     ASSESSMENT AND PLAN:  1. Coronary artery disease, status post PCI with JUDY x1 to mid LAD in 2011  - Stable without anginal symptoms.  - Will continue current regimen with aspirin 81 mg daily, amlodipine 10 mg daily, metoprolol XL 25 mg daily, high intensity statin, and zetia.    2. Hypertension  - Adequately controlled. Will continue current regimen with losartan 100 mg daily, metoprolol XL 25 mg daily, and amlodipine 10 mg once daily.    3. Dyslipidemia  - Treated on rosuvastatin 40 mg daily with zetia 10 mg daily with recent fasting lipid panel with results as noted above. Will continue her current regimen and reviewed trying to get regular exercise and stick to a heart healthy Mediterranean style diet to help with cholesterol management and trying to push her LDL cholesterol slightly lower to under 70.     4. Fibromuscular dysplasia of the renal arteries s/p PCTA   - Renal artery ultrasound in December 2019 showed elevated velocities in the right renal artery. Findings were discussed with Dr. Taveras who recommended continued medical therapy given that her blood pressure was well controlled.    Thank you for the opportunity to participate in this pleasant patient's care. I will plan to have her see Dr. Tobias around this time next year for a routine annual  follow up visit with a repeat fasting lipid profile beforehand. We would be happy to see her sooner if needed for any concerns in the meantime.     35 total minutes was spent today including chart review,  precharting, history and exam, post visit documentation, and reviewing studies as outlined above.     JOHNNY Luna, CNP   Nurse Practitioner  [facility]  Pager: [phone]  Text Page  (8am - 5pm, M-F)    Orders this Visit:  Orders Placed This Encounter   Procedures     Follow-Up with Cardiology     Orders Placed This Encounter   Medications     rosuvastatin (CRESTOR) 40 MG tablet     Sig: Take 1 tablet (40 mg) by mouth daily     Dispense:  90 tablet     Refill:  3     losartan (COZAAR) 100 MG tablet     Sig: Take 1 tablet (100 mg) by mouth daily     Dispense:  90 tablet     Refill:  3     ezetimibe (ZETIA) 10 MG tablet     Sig: Take 1 tablet (10 mg) by mouth daily     Dispense:  90 tablet     Refill:  3     amLODIPine (NORVASC) 10 MG tablet     Sig: Take 1 tablet (10 mg) by mouth daily     Dispense:  90 tablet     Refill:  1     metoprolol succinate ER (TOPROL-XL) 25 MG 24 hr tablet     Sig: Take 1 tablet (25 mg) by mouth daily     Dispense:  90 tablet     Refill:  3     aspirin 81 MG EC tablet     Sig: Take 1 tablet (81 mg) by mouth daily     Dispense:  90 tablet     Refill:  3     Medications Discontinued During This Encounter   Medication Reason     metoprolol (TOPROL-XL) 25 MG 24 hr tablet Reorder     rosuvastatin (CRESTOR) 40 MG tablet Reorder     losartan (COZAAR) 100 MG tablet Reorder     amLODIPine (NORVASC) 10 MG tablet Reorder     aspirin 81 MG EC tablet Reorder     ezetimibe (ZETIA) 10 MG tablet Reorder     Encounter Diagnoses   Name Primary?     Coronary artery disease involving native coronary artery of native heart without angina pectoris Yes     Benign essential hypertension      Mixed hyperlipidemia      Morbid obesity (H)      Fibromuscular dysplasia (H)      Renal artery stenosis (H)      Coronary artery disease involving native coronary artery of native heart with unstable angina pectoris (H)      CURRENT MEDICATIONS:  Current Outpatient Medications   Medication Sig  Dispense Refill     albuterol (PROAIR HFA/PROVENTIL HFA/VENTOLIN HFA) 108 (90 Base) MCG/ACT inhaler Inhale 1-2 puffs into the lungs every 4 hours as needed for shortness of breath / dyspnea or wheezing       amLODIPine (NORVASC) 10 MG tablet Take 1 tablet (10 mg) by mouth daily 90 tablet 1     aspirin 81 MG EC tablet Take 1 tablet (81 mg) by mouth daily 90 tablet 3     ezetimibe (ZETIA) 10 MG tablet Take 1 tablet (10 mg) by mouth daily 90 tablet 3     losartan (COZAAR) 100 MG tablet Take 1 tablet (100 mg) by mouth daily 90 tablet 3     metoprolol succinate ER (TOPROL-XL) 25 MG 24 hr tablet Take 1 tablet (25 mg) by mouth daily 90 tablet 3     nitroGLYcerin (NITROSTAT) 0.4 MG sublingual tablet Place 1 tablet (0.4 mg) under the tongue every 5 minutes as needed for chest pain 25 tablet 1     omeprazole (PRILOSEC) 20 MG DR capsule Take 20 mg by mouth daily       rosuvastatin (CRESTOR) 40 MG tablet Take 1 tablet (40 mg) by mouth daily 90 tablet 3     ALLERGIES  Allergies   Allergen Reactions     Imdur [Isosorbide] Other (See Comments)     Headache.  Patient took 30mg dose for 2 days and had HA on consecutive days.     Morphine Sulfate Nausea and Vomiting     PAST MEDICAL, SURGICAL, FAMILY HISTORY:  History was reviewed and updated as needed, see medical record.    SOCIAL HISTORY:  Social History     Socioeconomic History     Marital status: Single     Spouse name: Not on file     Number of children: Not on file     Years of education: Not on file     Highest education level: Not on file   Occupational History     Not on file   Tobacco Use     Smoking status: Former Smoker     Packs/day: 1.00     Years: 20.00     Pack years: 20.00     Types: Cigarettes     Start date:      Quit date:      Years since quittin.1     Smokeless tobacco: Former User     Quit date: 1995   Substance and Sexual Activity     Alcohol use: Yes     Comment: wine on rare occasions      Drug use: No     Sexual activity: Not on file  "  Other Topics Concern     Parent/sibling w/ CABG, MI or angioplasty before 65F 55M? Not Asked      Service Not Asked     Blood Transfusions Not Asked     Caffeine Concern Not Asked     Occupational Exposure Not Asked     Hobby Hazards Not Asked     Sleep Concern No     Stress Concern No     Weight Concern Not Asked     Special Diet Not Asked     Back Care Not Asked     Exercise Yes     Comment: on occasion     Bike Helmet Not Asked     Seat Belt Not Asked     Self-Exams Not Asked   Social History Narrative     Not on file     Social Determinants of Health     Financial Resource Strain: Not on file   Food Insecurity: Not on file   Transportation Needs: Not on file   Physical Activity: Not on file   Stress: Not on file   Social Connections: Not on file   Intimate Partner Violence: Not on file   Housing Stability: Not on file     Review of Systems:  Skin:  Positive for lumps or bumps   Eyes:  Positive for glasses;contacts  ENT:  Negative    Respiratory:  Negative    Cardiovascular:    Positive for;heaviness  Gastroenterology: Positive for heartburn;nausea  Genitourinary:  Negative    Musculoskeletal:  Positive for joint pain  Neurologic:  Positive for numbness or tingling of hands  Psychiatric:  Negative    Heme/Lymph/Imm:  Positive for allergies  Endocrine:  Positive for hot flashes;night sweats     Physical Exam:  Vitals: /80 (BP Location: Right arm, Patient Position: Sitting)   Pulse 78   Ht 1.626 m (5' 4\")   Wt 98.3 kg (216 lb 12.8 oz)   SpO2 98%   BMI 37.21 kg/m     Wt Readings from Last 4 Encounters:   02/07/22 98.3 kg (216 lb 12.8 oz)   06/06/21 99.8 kg (220 lb)   01/27/21 96.6 kg (213 lb)   01/07/20 98 kg (216 lb)     CONSTITUTIONAL: Appears her stated age, well nourished, and in no acute distress.  HEENT: Pupils equal, round. Sclerae nonicteric.    NECK: Supple, no masses or JVD appreciated.   C/V:  Regular rate and rhythm, normal S1 and S2, no S3 or S4, no murmur, rub or gallop.   RESP: " Respirations are unlabored. Lungs are clear to auscultation bilaterally without wheezing, rales, or rhonchi.  EXTREM: No clubbing, cyanosis, or lower extremity edema bilaterally.   NEURO: Alert and oriented, cooperative. Gait steady. No gross focal deficits.   PSYCH: Affect appropriate. Mentation normal. Responds to questions appropriately.  SKIN: Warm and dry.    Recent Lab Results:  LIPID RESULTS:  Lab Results   Component Value Date    CHOL 141 01/31/2022    CHOL 148 01/26/2021    HDL 38 (L) 01/31/2022    HDL 39 01/26/2021    LDL 76 01/31/2022    LDL 87 01/26/2021    TRIG 135 01/31/2022    TRIG 111 01/26/2021    CHOLHDLRATIO 3.2 07/09/2015     LIVER ENZYME RESULTS:  Lab Results   Component Value Date    AST 21 06/06/2021    ALT 23 01/31/2022    ALT 25 06/06/2021     CBC RESULTS:  Lab Results   Component Value Date    WBC 7.4 06/06/2021    RBC 4.53 06/06/2021    HGB 12.1 06/06/2021    HCT 38.5 06/06/2021    MCV 85 06/06/2021    MCH 26.7 06/06/2021    MCHC 31.4 (L) 06/06/2021    RDW 13.7 06/06/2021     06/06/2021     BMP RESULTS:  Lab Results   Component Value Date     01/31/2022     06/06/2021    POTASSIUM 3.3 (L) 01/31/2022    POTASSIUM 4.0 06/06/2021    CHLORIDE 109 01/31/2022    CHLORIDE 108 06/06/2021    CO2 26 01/31/2022    CO2 31 06/06/2021    ANIONGAP 5 01/31/2022    ANIONGAP 3 06/06/2021    GLC 90 01/31/2022    GLC 86 06/06/2021    BUN 11 01/31/2022    BUN 14 06/06/2021    CR 0.82 01/31/2022    CR 0.89 06/06/2021    GFRESTIMATED 83 01/31/2022    GFRESTIMATED 72 06/06/2021    GFRESTBLACK 84 06/06/2021    ROSANNA 8.8 01/31/2022    ROSANNA 9.3 06/06/2021      A1C RESULTS:  Lab Results   Component Value Date    A1C 5.8 (H) 10/21/2019     This note was completed in part using Dragon voice recognition software. Although reviewed after completion, some word and grammatical errors may occur.

## 2025-03-26 ENCOUNTER — HOSPITAL ENCOUNTER (EMERGENCY)
Facility: CLINIC | Age: 60
Discharge: HOME | End: 2025-03-26
Attending: EMERGENCY MEDICINE
Payer: COMMERCIAL

## 2025-03-26 ENCOUNTER — APPOINTMENT (OUTPATIENT)
Dept: GENERAL RADIOLOGY | Facility: CLINIC | Age: 60
End: 2025-03-26
Attending: EMERGENCY MEDICINE
Payer: COMMERCIAL

## 2025-03-26 VITALS
RESPIRATION RATE: 20 BRPM | OXYGEN SATURATION: 96 % | DIASTOLIC BLOOD PRESSURE: 89 MMHG | HEART RATE: 81 BPM | SYSTOLIC BLOOD PRESSURE: 150 MMHG | TEMPERATURE: 100.4 F

## 2025-03-26 DIAGNOSIS — J06.9 UPPER RESPIRATORY TRACT INFECTION, UNSPECIFIED TYPE: Primary | ICD-10-CM

## 2025-03-26 DIAGNOSIS — E87.6 HYPOKALEMIA: ICD-10-CM

## 2025-03-26 DIAGNOSIS — R07.9 CHEST PAIN, UNSPECIFIED TYPE: ICD-10-CM

## 2025-03-26 DIAGNOSIS — R42 LIGHTHEADEDNESS: ICD-10-CM

## 2025-03-26 LAB
ALBUMIN SERPL BCG-MCNC: 4.4 G/DL (ref 3.5–5.2)
ALBUMIN UR-MCNC: 20 MG/DL
ALP SERPL-CCNC: 85 U/L (ref 40–150)
ALT SERPL W P-5'-P-CCNC: 27 U/L (ref 0–50)
ANION GAP SERPL CALCULATED.3IONS-SCNC: 12 MMOL/L (ref 7–15)
APPEARANCE UR: CLEAR
AST SERPL W P-5'-P-CCNC: 32 U/L (ref 0–45)
ATRIAL RATE - MUSE: 90 BPM
BASOPHILS # BLD AUTO: 0 10E3/UL (ref 0–0.2)
BASOPHILS NFR BLD AUTO: 1 %
BILIRUB SERPL-MCNC: 0.4 MG/DL
BILIRUB UR QL STRIP: NEGATIVE
BUN SERPL-MCNC: 9.8 MG/DL (ref 8–23)
CALCIUM SERPL-MCNC: 9.4 MG/DL (ref 8.8–10.4)
CHLORIDE SERPL-SCNC: 100 MMOL/L (ref 98–107)
COLOR UR AUTO: YELLOW
CREAT SERPL-MCNC: 1.01 MG/DL (ref 0.51–0.95)
D DIMER PPP FEU-MCNC: 0.36 UG/ML FEU (ref 0–0.5)
DIASTOLIC BLOOD PRESSURE - MUSE: NORMAL MMHG
EGFRCR SERPLBLD CKD-EPI 2021: 63 ML/MIN/1.73M2
EOSINOPHIL # BLD AUTO: 0.5 10E3/UL (ref 0–0.7)
EOSINOPHIL NFR BLD AUTO: 9 %
ERYTHROCYTE [DISTWIDTH] IN BLOOD BY AUTOMATED COUNT: 13.3 % (ref 10–15)
FLUAV RNA SPEC QL NAA+PROBE: NEGATIVE
FLUBV RNA RESP QL NAA+PROBE: NEGATIVE
GLUCOSE SERPL-MCNC: 106 MG/DL (ref 70–99)
GLUCOSE UR STRIP-MCNC: NEGATIVE MG/DL
HCO3 SERPL-SCNC: 26 MMOL/L (ref 22–29)
HCT VFR BLD AUTO: 43 % (ref 35–47)
HGB BLD-MCNC: 13.7 G/DL (ref 11.7–15.7)
HGB UR QL STRIP: ABNORMAL
HOLD SPECIMEN: NORMAL
IMM GRANULOCYTES # BLD: 0 10E3/UL
IMM GRANULOCYTES NFR BLD: 0 %
INTERPRETATION ECG - MUSE: NORMAL
KETONES UR STRIP-MCNC: ABNORMAL MG/DL
LEUKOCYTE ESTERASE UR QL STRIP: ABNORMAL
LYMPHOCYTES # BLD AUTO: 1.9 10E3/UL (ref 0.8–5.3)
LYMPHOCYTES NFR BLD AUTO: 32 %
MCH RBC QN AUTO: 27 PG (ref 26.5–33)
MCHC RBC AUTO-ENTMCNC: 31.9 G/DL (ref 31.5–36.5)
MCV RBC AUTO: 85 FL (ref 78–100)
MONOCYTES # BLD AUTO: 0.6 10E3/UL (ref 0–1.3)
MONOCYTES NFR BLD AUTO: 10 %
MUCOUS THREADS #/AREA URNS LPF: PRESENT /LPF
NEUTROPHILS # BLD AUTO: 2.9 10E3/UL (ref 1.6–8.3)
NEUTROPHILS NFR BLD AUTO: 49 %
NITRATE UR QL: NEGATIVE
NRBC # BLD AUTO: 0 10E3/UL
NRBC BLD AUTO-RTO: 0 /100
P AXIS - MUSE: 69 DEGREES
PH UR STRIP: 5.5 [PH] (ref 5–7)
PLATELET # BLD AUTO: 230 10E3/UL (ref 150–450)
POTASSIUM SERPL-SCNC: 3.2 MMOL/L (ref 3.4–5.3)
PR INTERVAL - MUSE: 174 MS
PROT SERPL-MCNC: 7.8 G/DL (ref 6.4–8.3)
QRS DURATION - MUSE: 114 MS
QT - MUSE: 390 MS
QTC - MUSE: 477 MS
R AXIS - MUSE: 7 DEGREES
RBC # BLD AUTO: 5.08 10E6/UL (ref 3.8–5.2)
RBC URINE: 2 /HPF
RSV RNA SPEC NAA+PROBE: NEGATIVE
SARS-COV-2 RNA RESP QL NAA+PROBE: NEGATIVE
SODIUM SERPL-SCNC: 138 MMOL/L (ref 135–145)
SP GR UR STRIP: 1.03 (ref 1–1.03)
SQUAMOUS EPITHELIAL: <1 /HPF
SYSTOLIC BLOOD PRESSURE - MUSE: NORMAL MMHG
T AXIS - MUSE: 47 DEGREES
TROPONIN T SERPL HS-MCNC: 10 NG/L
TROPONIN T SERPL HS-MCNC: 8 NG/L
UROBILINOGEN UR STRIP-MCNC: 2 MG/DL
VENTRICULAR RATE- MUSE: 90 BPM
WBC # BLD AUTO: 6 10E3/UL (ref 4–11)
WBC URINE: 3 /HPF

## 2025-03-26 PROCEDURE — 85379 FIBRIN DEGRADATION QUANT: CPT | Performed by: EMERGENCY MEDICINE

## 2025-03-26 PROCEDURE — 82040 ASSAY OF SERUM ALBUMIN: CPT | Performed by: EMERGENCY MEDICINE

## 2025-03-26 PROCEDURE — 250N000013 HC RX MED GY IP 250 OP 250 PS 637: Performed by: EMERGENCY MEDICINE

## 2025-03-26 PROCEDURE — 87637 SARSCOV2&INF A&B&RSV AMP PRB: CPT | Performed by: EMERGENCY MEDICINE

## 2025-03-26 PROCEDURE — 84484 ASSAY OF TROPONIN QUANT: CPT | Performed by: EMERGENCY MEDICINE

## 2025-03-26 PROCEDURE — 71046 X-RAY EXAM CHEST 2 VIEWS: CPT

## 2025-03-26 PROCEDURE — 36415 COLL VENOUS BLD VENIPUNCTURE: CPT | Performed by: EMERGENCY MEDICINE

## 2025-03-26 PROCEDURE — 81001 URINALYSIS AUTO W/SCOPE: CPT | Performed by: EMERGENCY MEDICINE

## 2025-03-26 PROCEDURE — 85004 AUTOMATED DIFF WBC COUNT: CPT | Performed by: EMERGENCY MEDICINE

## 2025-03-26 PROCEDURE — 93005 ELECTROCARDIOGRAM TRACING: CPT

## 2025-03-26 PROCEDURE — 99285 EMERGENCY DEPT VISIT HI MDM: CPT | Mod: 25

## 2025-03-26 PROCEDURE — 80053 COMPREHEN METABOLIC PANEL: CPT | Performed by: EMERGENCY MEDICINE

## 2025-03-26 PROCEDURE — 82565 ASSAY OF CREATININE: CPT | Performed by: EMERGENCY MEDICINE

## 2025-03-26 PROCEDURE — 85018 HEMOGLOBIN: CPT | Performed by: EMERGENCY MEDICINE

## 2025-03-26 RX ORDER — ACETAMINOPHEN 325 MG/1
975 TABLET ORAL ONCE
Status: COMPLETED | OUTPATIENT
Start: 2025-03-26 | End: 2025-03-26

## 2025-03-26 RX ADMIN — ACETAMINOPHEN 975 MG: 325 TABLET ORAL at 17:47

## 2025-03-26 ASSESSMENT — ACTIVITIES OF DAILY LIVING (ADL)
ADLS_ACUITY_SCORE: 45

## 2025-03-26 NOTE — ED PROVIDER NOTES
Emergency Department Note      History of Present Illness     Chief Complaint   Chest Pain and Breathing Problem      HPI   Glenis Ni is a 59 year old female with a history of stent placement on aspirin, hypertension and hyperlipidemia presenting with cough and chest pain. Glenis has been sick since Sunday (3/22/25) from common illness from her daughter. She reports that she has a productive cough with chest pain when the mucous is stuck in her throat/chest that is a dull ache/tightness. She reports that she chest pain waxes and wanes but is more pronounced after coughing when she is unable to clear sputum. She repots that the there is occasional pain that radiates down the back side of her left arm and bodyaches she was experiencing low backache yesterday, but that has since resolved.. She endorses mild shortness of breath, fever of 100 degrees Fahrenheit, rhinorrhea, congestion and body aches. She denies leg swelling or cramping, no urinary symptoms, or no diabetes.      Independent Historian   None    Review of External Notes   3/17/2025 office visit well woman exam note    Past Medical History     Medical History and Problem List   Asthma  Coronary artery disease  Fibromuscular dysplasia  GERD (gastroesophageal reflux disease)  Hyperlipidemia LDL goal <70  Hypertension  MI (myocardial infarction) (H)  Peripheral vascular disease  Renal artery stenosis  Sleep apnea    Medications   Albuterol   Norvasc   Aspirin 81   Zetia   Cozaar  Toprol   Nitrostat  Prilosec   Crestor   Aldactone     Surgical History   Colonoscopy   Hysterectomy     Physical Exam     Patient Vitals for the past 24 hrs:   BP Temp Temp src Pulse Resp SpO2   03/26/25 2040 (!) 150/89 -- -- 81 20 96 %   03/26/25 1733 -- 100.4  F (38  C) Oral -- -- --   03/26/25 1712 (!) 147/86 -- -- 90 18 96 %     Physical Exam  Constitutional:       Appearance: Normal appearance.   HENT:      Head: Normocephalic and atraumatic.   Eyes:      Extraocular  Movements: Extraocular movements intact.      Conjunctiva/sclera: Conjunctivae normal.   Cardiovascular:      Rate and Rhythm: Normal rate and regular rhythm.   Pulmonary:      Effort: Pulmonary effort is normal. No respiratory distress.      Breath sounds: Clear to auscultation bilaterally.  No wheezing.  No crackles.  No stridor.  Abdominal:      General: Abdomen is flat. There is no distension.      Palpations: Abdomen is soft.      Tenderness: There is no abdominal tenderness.   Musculoskeletal:      Cervical back: Normal range of motion. No rigidity.       Right lower leg: No edema.      Left lower leg: No edema.   Skin:     General: Skin is warm and dry.   Neurological:      General: No focal deficit present.      Mental Status: Alert and oriented to person, place, and time.   Psychiatric:         Mood and Affect: Mood normal.         Behavior: Behavior normal.    Diagnostics     Lab Results   Labs Ordered and Resulted from Time of ED Arrival to Time of ED Departure   COMPREHENSIVE METABOLIC PANEL - Abnormal       Result Value    Sodium 138      Potassium 3.2 (*)     Carbon Dioxide (CO2) 26      Anion Gap 12      Urea Nitrogen 9.8      Creatinine 1.01 (*)     GFR Estimate 63      Calcium 9.4      Chloride 100      Glucose 106 (*)     Alkaline Phosphatase 85      AST 32      ALT 27      Protein Total 7.8      Albumin 4.4      Bilirubin Total 0.4     ROUTINE UA WITH MICROSCOPIC REFLEX TO CULTURE - Abnormal    Color Urine Yellow      Appearance Urine Clear      Glucose Urine Negative      Bilirubin Urine Negative      Ketones Urine Trace (*)     Specific Gravity Urine 1.029      Blood Urine Moderate (*)     pH Urine 5.5      Protein Albumin Urine 20 (*)     Urobilinogen Urine 2.0 (*)     Nitrite Urine Negative      Leukocyte Esterase Urine Trace (*)     Mucus Urine Present (*)     RBC Urine 2      WBC Urine 3      Squamous Epithelials Urine <1     INFLUENZA A/B, RSV AND SARS-COV2 PCR - Normal    Influenza A PCR  Negative      Influenza B PCR Negative      RSV PCR Negative      SARS CoV2 PCR Negative     D DIMER QUANTITATIVE - Normal    D-Dimer Quantitative 0.36     TROPONIN T, HIGH SENSITIVITY - Normal    Troponin T, High Sensitivity 10     TROPONIN T, HIGH SENSITIVITY - Normal    Troponin T, High Sensitivity 8     CBC WITH PLATELETS AND DIFFERENTIAL    WBC Count 6.0      RBC Count 5.08      Hemoglobin 13.7      Hematocrit 43.0      MCV 85      MCH 27.0      MCHC 31.9      RDW 13.3      Platelet Count 230      % Neutrophils 49      % Lymphocytes 32      % Monocytes 10      % Eosinophils 9      % Basophils 1      % Immature Granulocytes 0      NRBCs per 100 WBC 0      Absolute Neutrophils 2.9      Absolute Lymphocytes 1.9      Absolute Monocytes 0.6      Absolute Eosinophils 0.5      Absolute Basophils 0.0      Absolute Immature Granulocytes 0.0      Absolute NRBCs 0.0         Imaging   XR Chest 2 Views   Final Result   IMPRESSION: Very shallow inspiration accentuates the pulmonary vascularity. Chest otherwise negative.          EKG   ECG results from 03/26/25   EKG 12-lead, tracing only     Value    Systolic Blood Pressure     Diastolic Blood Pressure     Ventricular Rate 90    Atrial Rate 90    GA Interval 174    QRS Duration 114        QTc 477    P Axis 69    R AXIS 7    T Axis 47    Interpretation ECG      Sinus rhythm  Low voltage QRS  Right bundle branch block  Abnormal ECG  When compared with ECG of 06-Jun-2021 13:29,  No significant change was found    Interpreted by me at 1800           Independent Interpretation   ED course    ED Course      Medications Administered   Medications   acetaminophen (TYLENOL) tablet 975 mg (975 mg Oral $Given 3/26/25 7621)       Procedures   Procedures     Discussion of Management   None    ED Course   ED Course as of 03/26/25 2154   Wed Mar 26, 2025   1738 I obtained the history and examined the patient as noted above.      1807 D-Dimer Quantitative: 0.36   1840 XR Chest 2  Views  On my independent interpretation of chest x-ray, there is no obvious focal opacity, mediastinal widening, or pneumothorax.   1841 EKG 12-lead, tracing only  Normal sinus rhythm.  Rate of 90.  Normal LA and QRS.  QTc 477.  Right bundle branch block pattern.  No acute ST elevation or depression as compared with 5/6/2024 EKG.   2100 I rechecked and updated the patient. Comfortable with plan for discharge.     2100 Updated       Additional Documentation  None    Medical Decision Making / Diagnosis     CMS Diagnoses: None    MIPS       None    [facility]   Glenis Ni is a 59-year-old female as described above presents to the emergency department for flulike illness and associated chest pain/exertional shortness of breath.  Patient hemodynamically stable at time of evaluation. Temperature of 100.4  F.  Symptoms today suggestive most likely of acute viral URI/upper respiratory tract infection.  Nonetheless, additional differential diagnosis considered includes, but not limited to, pericarditis, myocarditis, ACS, thoracic aortic dissection, pneumonia, pulmonary embolism, and pulmonary edema.  Chest pain/shortness of breath workup ordered.  Infectious workup.  Chest imaging modality pending D-dimer.  Discussed care plan with patient who voiced understanding and agreement with plan.  Answered all questions.  Additional workup and orders as listed in chart.     Ultimately, work up shows 2 set flat high-sensitivity troponin.  EKG unremarkable.  Low suspicion for ischemic cardiac disease process.  Viral swab negative.  D-dimer negative for pulmonary embolism.  Chest x-ray unremarkable, but overall does have a viral URI type picture.  Patient recently had negative stress testing completed on 11/5/2024.  Low suspicion that patient's pain today is secondary to ACS and more likely secondary to viral URI.  Nonetheless, given patient's risk factors, advised for patient to follow-up outpatient with primary care provider for  discussion on need for further cardiac workup.  Advised over-the-counter decongestions such as Mucinex.  Patient does have mild creatinine elevation and mild hypokalemia on exam today, likely secondary to mild dehydration.  Advise increasing oral fluid hydration with electrolyte balance fluids such as Pedialyte.  Discussed return precautions.  Answered all questions.  Patient voiced understanding and agreement with plan and comfortable with discharge home.      Please refer to ED course above as part of continuation of MDM for details on the patient's treatment course and any potential changes or updates beyond my initial evaluation and MDM creation.    Disposition   The patient was discharged.     Diagnosis     ICD-10-CM    1. Upper respiratory tract infection, unspecified type  J06.9       2. Chest pain, unspecified type  R07.9       3. Lightheadedness  R42       4. Hypokalemia  E87.6            Discharge Medications   Discharge Medication List as of 3/26/2025  9:11 PM            Scribe Disclosure:  I, Radha Reyes, am serving as a scribe at 6:31 PM on 3/26/2025 to document services personally performed by Gustavo Georges DO based on my observations and the provider's statements to me.        Gustavo Georges DO  03/26/25 0918

## 2025-03-26 NOTE — ED TRIAGE NOTES
pt reports believes she has a cold were she has increased coughing and sputum production.     Pt also reports associated CP even when not coughing for the past few days

## 2025-03-27 NOTE — DISCHARGE INSTRUCTIONS
Please follow-up with your primary care provider and/or specialist regarding your visit to the ER today.    Please return to the emergency department should you experience any of the symptoms we specifically discussed, including but not limited to recurrence or worsening of your symptoms, or development of any new and concerning symptoms such as persistent fever, worsening chest pain or shortness of breath, or new exertional chest pain.    Please follow-up with your primary care doctor for discussion of potential repeat outpatient stress testing.    Please keep up with oral fluid hydration.  Pedialyte is a good option.  Please increase your potassium rich food intake in the coming days.

## 2025-05-12 ENCOUNTER — OFFICE VISIT (OUTPATIENT)
Dept: CARDIOLOGY | Facility: CLINIC | Age: 60
End: 2025-05-12
Payer: COMMERCIAL

## 2025-05-12 ENCOUNTER — LAB (OUTPATIENT)
Dept: LAB | Facility: CLINIC | Age: 60
End: 2025-05-12
Payer: COMMERCIAL

## 2025-05-12 VITALS
OXYGEN SATURATION: 98 % | SYSTOLIC BLOOD PRESSURE: 149 MMHG | BODY MASS INDEX: 36.7 KG/M2 | HEIGHT: 64 IN | WEIGHT: 215 LBS | DIASTOLIC BLOOD PRESSURE: 92 MMHG | HEART RATE: 65 BPM

## 2025-05-12 DIAGNOSIS — E78.2 MIXED HYPERLIPIDEMIA: ICD-10-CM

## 2025-05-12 DIAGNOSIS — I10 BENIGN ESSENTIAL HYPERTENSION: ICD-10-CM

## 2025-05-12 DIAGNOSIS — I70.1 RENAL ARTERY STENOSIS: ICD-10-CM

## 2025-05-12 DIAGNOSIS — I25.10 CORONARY ARTERY DISEASE INVOLVING NATIVE CORONARY ARTERY OF NATIVE HEART WITHOUT ANGINA PECTORIS: ICD-10-CM

## 2025-05-12 DIAGNOSIS — I25.110 CORONARY ARTERY DISEASE INVOLVING NATIVE CORONARY ARTERY OF NATIVE HEART WITH UNSTABLE ANGINA PECTORIS (H): ICD-10-CM

## 2025-05-12 LAB
ANION GAP SERPL CALCULATED.3IONS-SCNC: 9 MMOL/L (ref 7–15)
BUN SERPL-MCNC: 11.6 MG/DL (ref 8–23)
CALCIUM SERPL-MCNC: 9.7 MG/DL (ref 8.8–10.4)
CHLORIDE SERPL-SCNC: 103 MMOL/L (ref 98–107)
CHOLEST SERPL-MCNC: 150 MG/DL
CREAT SERPL-MCNC: 0.94 MG/DL (ref 0.51–0.95)
EGFRCR SERPLBLD CKD-EPI 2021: 69 ML/MIN/1.73M2
FASTING STATUS PATIENT QL REPORTED: YES
GLUCOSE SERPL-MCNC: 91 MG/DL (ref 70–99)
HCO3 SERPL-SCNC: 30 MMOL/L (ref 22–29)
HDLC SERPL-MCNC: 37 MG/DL
LDLC SERPL CALC-MCNC: 86 MG/DL
NONHDLC SERPL-MCNC: 113 MG/DL
POTASSIUM SERPL-SCNC: 4 MMOL/L (ref 3.4–5.3)
SODIUM SERPL-SCNC: 142 MMOL/L (ref 135–145)
TRIGL SERPL-MCNC: 133 MG/DL

## 2025-05-12 PROCEDURE — 99214 OFFICE O/P EST MOD 30 MIN: CPT | Performed by: NURSE PRACTITIONER

## 2025-05-12 PROCEDURE — 80048 BASIC METABOLIC PNL TOTAL CA: CPT

## 2025-05-12 PROCEDURE — 3077F SYST BP >= 140 MM HG: CPT | Performed by: NURSE PRACTITIONER

## 2025-05-12 PROCEDURE — 36415 COLL VENOUS BLD VENIPUNCTURE: CPT

## 2025-05-12 PROCEDURE — 3080F DIAST BP >= 90 MM HG: CPT | Performed by: NURSE PRACTITIONER

## 2025-05-12 PROCEDURE — 82465 ASSAY BLD/SERUM CHOLESTEROL: CPT

## 2025-05-12 NOTE — PATIENT INSTRUCTIONS
Today's Plan:     - Schedule labs today.   - Follow-up with me in 1 month for blood pressure check.      If you have questions or concerns please call my nurse team:  Amena RN ([phone]) Rayne RN ([phone]) or Carmen RN ([phone])    After Hours: [phone], option #2, ask for cardiologist on-call    Scheduling phone number: [phone]    Reminder: Please bring in all current medications, over the counter supplements and vitamin bottles to your next appointment.-    It was a pleasure seeing you today!     Zuleima Baumann, MARY CARMEN  5/12/2025    -

## 2025-05-12 NOTE — LETTER
5/12/2025    Charisse Vallecillosagar   [facility] [address] [place] [address]    RE: Glenis Ni       Dear Colleague,     I had the pleasure of seeing Glenis Ni in the [facility].  Cardiology Clinic Progress Note  Glenis Ni MRN# [id number]   YOB: 1965 Age: 59 year old     Primary cardiologist: Dr. Tobias    Reason for visit: hypertension     History of presenting illness:    Glenis Ni is a pleasant 59 year old patient with past medical history significant for CAD s/p PCI of LAD in 2011, longstanding hypertension, known renal artery fibromuscular dysplasia s/p prior renal angioplasties.     Her last coronary angiogram was in June 2014 at which point she had no significant obstructive disease.  Prior to her last visit in October 2024 she noted some exertional chest discomfort.  She underwent exercise nuclear stress test that was negative for inducible ischemia or infarction.  The LV function with stress was 65%.    She has been seen by Dr. Taveras for evaluation of renal artery stenosis. She did undergo a renal artery ultrasound in 12/2019 that demonstrated elevated velocities in the right renal artery.  At that time, her blood pressure and renal function were within normal limits so medical management was recommended. Her last visit was May of last year at the time her blood pressure was elevated but given that this was an isolated reading, and her kidney function was normal, continued medical management was again recommended.  Plan was to pursue angiogram and repeat angioplasty in the setting of known FMD and elevated velocities if her blood pressure was persistently elevated.     In 3/2025, she presented to the ED with cough and chest pain.  Her cardiac workup including EKG and troponins were unremarkable.  CT PE was negative.  She had low-grade fever.  It was felt her symptoms were likely due to upper respiratory infection.  BMP at  the time was notable for potassium of 3.2 and creatinine of 1.01.    She returns today for follow-up.  She has no cardiopulmonary complaints.  She denies any recurrent chest pain, shortness of breath, syncope near syncope, or palpitations.  No PND or orthopnea.  No peripheral edema.    Her blood pressure is elevated today, though she did not take her medications this morning because she was running late to her appointment.  She is otherwise compliant on a daily basis.  Currently managed on amlodipine 10 mg daily, losartan 100 mg daily, spironolactone 12.5 mg daily, losartan 100 mg daily, and Toprol-XL 25 mg daily.           Assessment and Plan:     ASSESSMENT: Pertinent issues addressed at this visit     Longstanding hypertension.  Blood pressure elevated today, though she did not take her morning medications.  Of note, her blood pressure has been elevated at her recent follow-up visits.  Current regimen includes amlodipine, losartan, Toprol-XL, and spironolactone, as noted above.  She is compliant with her medications. Her home BP cuff is broken.   Renal fibromuscular dysplasia.  Status post bilateral balloon angioplasty in December 2011.  She was noted to have elevated velocities in the right renal artery on ultrasound most recently in May 2023.  She has been followed by Dr. Taveras, given adequate blood pressure control and normal renal function, medical management is recommended.  Though more recently, her blood pressures have been notably on the higher side with a slight bump in her creatinine.  Dyslipidemia.  Previously well-controlled on rosuvastatin 40 mg daily, due for repeat lipid panel.  Coronary disease s/p PCI to LAD (2011).  Most recent stress test 11/2024 negative for inducible ischemia.  She denies angina.  On aspirin and statin therapy.      PLAN:     Overall Glenis appears doing well from a cardiac standpoint, she has no exertional symptoms.  Will obtain repeat lipid panel and BMP today to recheck  "her potassium and kidney function.  Her blood pressure is elevated today, though she did not take her morning medications.  As mentioned above, her blood pressures have been running on the higher side over the last few months, I would like to see her back in about a month to revaluate.  If persistently elevated, she would likely benefit from repeat renal artery angiogram and possible angioplasty.         Zuleima Baumann, DNP, APRN, CNP  Page: [phone] (8a-5p M-F)    Orders this Visit:  Orders Placed This Encounter   Procedures     Basic metabolic panel     Lipid panel reflex to direct LDL Fasting     Follow-Up with Cardiology KAYLEE     No orders of the defined types were placed in this encounter.    There are no discontinued medications.    Today's clinic visit entailed:  Review of the result(s) of each unique test - echo, labs, EKG, stress test  Ordering of each unique test  Prescription drug management  35 minutes spent by me on the date of the encounter doing chart review, review of test results, interpretation of tests, patient visit, and documentation   Provider  Link to [facility] Help Grid     The level of medical decision making during this visit was of moderate complexity.           Review of Systems:     Review of Systems:  Skin:  not assessed     Eyes:  not assessed    ENT:  not assessed    Respiratory:  Negative    Cardiovascular:    Positive for, chest pain  Gastroenterology: not assessed    Genitourinary:  not assessed    Musculoskeletal:  not assessed    Neurologic:  not assessed    Psychiatric:  not assessed    Heme/Lymph/Imm:  not assessed    Endocrine:  not assessed              Physical Exam:   Vitals: BP (!) 149/92 (BP Location: Left arm, Patient Position: Sitting)   Pulse 65   Ht 1.626 m (5' 4\")   Wt 97.5 kg (215 lb)   SpO2 98%   BMI 36.90 kg/m    Constitutional:  cooperative        Skin:  warm and dry to the touch        Head:  normocephalic        Eyes:  pupils equal and round        ENT:  not " assessed this visit        Neck:  JVP normal        Chest:  normal breath sounds, clear to auscultation, normal A-P diameter, normal symmetry, normal respiratory excursion, no use of accessory muscles        Cardiac: regular rhythm, normal S1 and S2, no murmurs, gallops or rubs detected                  Abdomen:  abdomen soft        Vascular: pulses full and equal                                      Extremities and Back:  no edema        Neurological:  affect appropriate, no gross motor deficits             Medications:     Current Outpatient Medications   Medication Sig Dispense Refill     albuterol (PROAIR HFA/PROVENTIL HFA/VENTOLIN HFA) 108 (90 Base) MCG/ACT inhaler Inhale 1-2 puffs into the lungs every 4 hours as needed for shortness of breath / dyspnea or wheezing       amLODIPine (NORVASC) 10 MG tablet Take 1 tablet (10 mg) by mouth daily. 90 tablet 3     aspirin 81 MG EC tablet Take 1 tablet (81 mg) by mouth daily 90 tablet 3     ezetimibe (ZETIA) 10 MG tablet Take 1 tablet (10 mg) by mouth daily. 90 tablet 3     losartan (COZAAR) 100 MG tablet Take 1 tablet (100 mg) by mouth daily 90 tablet 3     metoprolol succinate ER (TOPROL XL) 25 MG 24 hr tablet Take 1 tablet (25 mg) by mouth daily 90 tablet 4     nitroGLYcerin (NITROSTAT) 0.4 MG sublingual tablet Place 1 tablet (0.4 mg) under the tongue every 5 minutes as needed for chest pain. 25 tablet 3     omeprazole (PRILOSEC) 20 MG DR capsule Take 20 mg by mouth daily       rosuvastatin (CRESTOR) 40 MG tablet Take 1 tablet (40 mg) by mouth daily. 90 tablet 3     spironolactone (ALDACTONE) 25 MG tablet Take 0.5 tablets (12.5 mg) by mouth daily. 45 tablet 3     UNABLE TO FIND MEDICATION NAME: Sea young (Patient not taking: Reported on 5/12/2025)         Family History   Problem Relation Age of Onset     Heart Disease Mother      Heart Disease Sister        Social History     Socioeconomic History     Marital status: Single     Spouse name: Not on file     Number of  children: Not on file     Years of education: Not on file     Highest education level: Not on file   Occupational History     Not on file   Tobacco Use     Smoking status: Former     Current packs/day: 0.00     Average packs/day: 1 pack/day for 20.0 years (20.0 ttl pk-yrs)     Types: Cigarettes     Start date:      Quit date:      Years since quittin.3     Smokeless tobacco: Former     Quit date: 1995   Substance and Sexual Activity     Alcohol use: Yes     Comment: wine on rare occasions      Drug use: No     Sexual activity: Not on file   Other Topics Concern     Parent/sibling w/ CABG, MI or angioplasty before 65F 55M? Not Asked      Service Not Asked     Blood Transfusions Not Asked     Caffeine Concern Not Asked     Occupational Exposure Not Asked     Hobby Hazards Not Asked     Sleep Concern No     Stress Concern No     Weight Concern Not Asked     Special Diet Not Asked     Back Care Not Asked     Exercise Yes     Comment: on occasion     Bike Helmet Not Asked     Seat Belt Not Asked     Self-Exams Not Asked   Social History Narrative     Not on file     Social Drivers of Health     Financial Resource Strain: Not on file   Food Insecurity: Not on file   Transportation Needs: Not on file   Physical Activity: Not on file   Stress: Not on file   Social Connections: Not on file   Interpersonal Safety: Not on file   Housing Stability: Not on file            Past Medical History:     Past Medical History:   Diagnosis Date     Asthma      Coronary artery disease     cardiac cath 2011: JUDY to LAD and 1st diagonal     Fibromuscular dysplasia      GERD (gastroesophageal reflux disease)      Hyperlipidemia LDL goal <70      Hypertension      MI (myocardial infarction) (H)      Peripheral vascular disease     angioplasty bilat renal arteries     Renal artery stenosis     Bilateral moderate renal stenosis noted on 13 renal ultrasound     Sleep apnea     mild per sleep study [facility] 7-10-15              Past Surgical History:     Past Surgical History:   Procedure Laterality Date     COLONOSCOPY N/A 1/13/2015    Procedure: COLONOSCOPY;  Surgeon: Juan Miguel Garcia MD;  Location:  GI     GYN SURGERY       HYSTERECTOMY      partial              Allergies:   Imdur [isosorbide nitrate], Morphine sulfate, and Penicillins       Data:   All laboratory data reviewed:    Recent Labs   Lab Test 05/08/23  0932 01/31/22  0904 01/26/21  0000 12/30/19  0907   LDL 88 76 87 82   HDL 37* 38* 39 43*   NHDL 116 103  --  110   CHOL 153 141 148 153   TRIG 139 135 111 139       Lab Results   Component Value Date    WBC 6.0 03/26/2025    WBC 7.4 06/06/2021    RBC 5.08 03/26/2025    RBC 4.53 06/06/2021    HGB 13.7 03/26/2025    HGB 12.1 06/06/2021    HCT 43.0 03/26/2025    HCT 38.5 06/06/2021    MCV 85 03/26/2025    MCV 85 06/06/2021    MCH 27.0 03/26/2025    MCH 26.7 06/06/2021    MCHC 31.9 03/26/2025    MCHC 31.4 (L) 06/06/2021    RDW 13.3 03/26/2025    RDW 13.7 06/06/2021     03/26/2025     06/06/2021       Lab Results   Component Value Date     05/12/2025     06/06/2021    POTASSIUM 4.0 05/12/2025    POTASSIUM 3.3 (L) 01/31/2022    POTASSIUM 4.0 06/06/2021    CHLORIDE 103 05/12/2025    CHLORIDE 109 01/31/2022    CHLORIDE 108 06/06/2021    CO2 30 (H) 05/12/2025    CO2 26 01/31/2022    CO2 31 06/06/2021    ANIONGAP 9 05/12/2025    ANIONGAP 5 01/31/2022    ANIONGAP 3 06/06/2021    GLC 91 05/12/2025    GLC 90 01/31/2022    GLC 86 06/06/2021    BUN 11.6 05/12/2025    BUN 11 01/31/2022    BUN 14 06/06/2021    CR 0.94 05/12/2025    CR 0.89 06/06/2021    GFRESTIMATED 69 05/12/2025    GFRESTIMATED 72 06/06/2021    GFRESTBLACK 84 06/06/2021    ROSANNA 9.7 05/12/2025    ROSANNA 9.3 06/06/2021      Lab Results   Component Value Date    AST 32 03/26/2025    AST 21 06/06/2021    ALT 27 03/26/2025    ALT 25 06/06/2021       Lab Results   Component Value Date    A1C 5.8 (H) 10/21/2019       Lab  Results   Component Value Date    INR 0.97 06/24/2014    INR 1.04 09/23/2013         Thank you for allowing me to participate in the care of your patient.      Sincerely,     Zuleima Baumann [facility]  cc:   Mica Tobias MD  [address]  [place] [id number]

## 2025-05-12 NOTE — PROGRESS NOTES
Cardiology Clinic Progress Note  Glenis Ni MRN# [id number]   YOB: 1965 Age: 59 year old     Primary cardiologist: Dr. Tobias    Reason for visit: hypertension     History of presenting illness:    Glenis Ni is a pleasant 59 year old patient with past medical history significant for CAD s/p PCI of LAD in 2011, longstanding hypertension, known renal artery fibromuscular dysplasia s/p prior renal angioplasties.     Her last coronary angiogram was in June 2014 at which point she had no significant obstructive disease.  Prior to her last visit in October 2024 she noted some exertional chest discomfort.  She underwent exercise nuclear stress test that was negative for inducible ischemia or infarction.  The LV function with stress was 65%.    She has been seen by Dr. Taveras for evaluation of renal artery stenosis. She did undergo a renal artery ultrasound in 12/2019 that demonstrated elevated velocities in the right renal artery.  At that time, her blood pressure and renal function were within normal limits so medical management was recommended. Her last visit was May of last year at the time her blood pressure was elevated but given that this was an isolated reading, and her kidney function was normal, continued medical management was again recommended.  Plan was to pursue angiogram and repeat angioplasty in the setting of known FMD and elevated velocities if her blood pressure was persistently elevated.     In 3/2025, she presented to the ED with cough and chest pain.  Her cardiac workup including EKG and troponins were unremarkable.  CT PE was negative.  She had low-grade fever.  It was felt her symptoms were likely due to upper respiratory infection.  BMP at the time was notable for potassium of 3.2 and creatinine of 1.01.    She returns today for follow-up.  She has no cardiopulmonary complaints.  She denies any recurrent chest pain, shortness of breath, syncope near syncope, or palpitations.   No PND or orthopnea.  No peripheral edema.    Her blood pressure is elevated today, though she did not take her medications this morning because she was running late to her appointment.  She is otherwise compliant on a daily basis.  Currently managed on amlodipine 10 mg daily, losartan 100 mg daily, spironolactone 12.5 mg daily, losartan 100 mg daily, and Toprol-XL 25 mg daily.           Assessment and Plan:     ASSESSMENT: Pertinent issues addressed at this visit     Longstanding hypertension.  Blood pressure elevated today, though she did not take her morning medications.  Of note, her blood pressure has been elevated at her recent follow-up visits.  Current regimen includes amlodipine, losartan, Toprol-XL, and spironolactone, as noted above.  She is compliant with her medications. Her home BP cuff is broken.   Renal fibromuscular dysplasia.  Status post bilateral balloon angioplasty in December 2011.  She was noted to have elevated velocities in the right renal artery on ultrasound most recently in May 2023.  She has been followed by Dr. Taveras, given adequate blood pressure control and normal renal function, medical management is recommended.  Though more recently, her blood pressures have been notably on the higher side with a slight bump in her creatinine.  Dyslipidemia.  Previously well-controlled on rosuvastatin 40 mg daily, due for repeat lipid panel.  Coronary disease s/p PCI to LAD (2011).  Most recent stress test 11/2024 negative for inducible ischemia.  She denies angina.  On aspirin and statin therapy.      PLAN:     Overall Glenis appears doing well from a cardiac standpoint, she has no exertional symptoms.  Will obtain repeat lipid panel and BMP today to recheck her potassium and kidney function.  Her blood pressure is elevated today, though she did not take her morning medications.  As mentioned above, her blood pressures have been running on the higher side over the last few months, I would like  "to see her back in about a month to revaluate.  If persistently elevated, she would likely benefit from repeat renal artery angiogram and possible angioplasty.         Zuleima Baumann, DNP, APRN, CNP  Page: [phone] (8a-5p M-F)    Orders this Visit:  Orders Placed This Encounter   Procedures    Basic metabolic panel    Lipid panel reflex to direct LDL Fasting    Follow-Up with Cardiology KAYLEE     No orders of the defined types were placed in this encounter.    There are no discontinued medications.    Today's clinic visit entailed:  Review of the result(s) of each unique test - echo, labs, EKG, stress test  Ordering of each unique test  Prescription drug management  35 minutes spent by me on the date of the encounter doing chart review, review of test results, interpretation of tests, patient visit, and documentation   Provider  Link to [facility] Help Grid     The level of medical decision making during this visit was of moderate complexity.           Review of Systems:     Review of Systems:  Skin:  not assessed     Eyes:  not assessed    ENT:  not assessed    Respiratory:  Negative    Cardiovascular:    Positive for, chest pain  Gastroenterology: not assessed    Genitourinary:  not assessed    Musculoskeletal:  not assessed    Neurologic:  not assessed    Psychiatric:  not assessed    Heme/Lymph/Imm:  not assessed    Endocrine:  not assessed              Physical Exam:   Vitals: BP (!) 149/92 (BP Location: Left arm, Patient Position: Sitting)   Pulse 65   Ht 1.626 m (5' 4\")   Wt 97.5 kg (215 lb)   SpO2 98%   BMI 36.90 kg/m    Constitutional:  cooperative        Skin:  warm and dry to the touch        Head:  normocephalic        Eyes:  pupils equal and round        ENT:  not assessed this visit        Neck:  JVP normal        Chest:  normal breath sounds, clear to auscultation, normal A-P diameter, normal symmetry, normal respiratory excursion, no use of accessory muscles        Cardiac: regular rhythm, normal S1 " and S2, no murmurs, gallops or rubs detected                  Abdomen:  abdomen soft        Vascular: pulses full and equal                                      Extremities and Back:  no edema        Neurological:  affect appropriate, no gross motor deficits             Medications:     Current Outpatient Medications   Medication Sig Dispense Refill    albuterol (PROAIR HFA/PROVENTIL HFA/VENTOLIN HFA) 108 (90 Base) MCG/ACT inhaler Inhale 1-2 puffs into the lungs every 4 hours as needed for shortness of breath / dyspnea or wheezing      amLODIPine (NORVASC) 10 MG tablet Take 1 tablet (10 mg) by mouth daily. 90 tablet 3    aspirin 81 MG EC tablet Take 1 tablet (81 mg) by mouth daily 90 tablet 3    ezetimibe (ZETIA) 10 MG tablet Take 1 tablet (10 mg) by mouth daily. 90 tablet 3    losartan (COZAAR) 100 MG tablet Take 1 tablet (100 mg) by mouth daily 90 tablet 3    metoprolol succinate ER (TOPROL XL) 25 MG 24 hr tablet Take 1 tablet (25 mg) by mouth daily 90 tablet 4    nitroGLYcerin (NITROSTAT) 0.4 MG sublingual tablet Place 1 tablet (0.4 mg) under the tongue every 5 minutes as needed for chest pain. 25 tablet 3    omeprazole (PRILOSEC) 20 MG DR capsule Take 20 mg by mouth daily      rosuvastatin (CRESTOR) 40 MG tablet Take 1 tablet (40 mg) by mouth daily. 90 tablet 3    spironolactone (ALDACTONE) 25 MG tablet Take 0.5 tablets (12.5 mg) by mouth daily. 45 tablet 3    UNABLE TO FIND MEDICATION NAME: Sea young (Patient not taking: Reported on 5/12/2025)         Family History   Problem Relation Age of Onset    Heart Disease Mother     Heart Disease Sister        Social History     Socioeconomic History    Marital status: Single     Spouse name: Not on file    Number of children: Not on file    Years of education: Not on file    Highest education level: Not on file   Occupational History    Not on file   Tobacco Use    Smoking status: Former     Current packs/day: 0.00     Average packs/day: 1 pack/day for 20.0 years (20.0  ttl pk-yrs)     Types: Cigarettes     Start date:      Quit date:      Years since quittin.3    Smokeless tobacco: Former     Quit date: 1995   Substance and Sexual Activity    Alcohol use: Yes     Comment: wine on rare occasions     Drug use: No    Sexual activity: Not on file   Other Topics Concern    Parent/sibling w/ CABG, MI or angioplasty before 65F 55M? Not Asked     Service Not Asked    Blood Transfusions Not Asked    Caffeine Concern Not Asked    Occupational Exposure Not Asked    Hobby Hazards Not Asked    Sleep Concern No    Stress Concern No    Weight Concern Not Asked    Special Diet Not Asked    Back Care Not Asked    Exercise Yes     Comment: on occasion    Bike Helmet Not Asked    Seat Belt Not Asked    Self-Exams Not Asked   Social History Narrative    Not on file     Social Drivers of Health     Financial Resource Strain: Not on file   Food Insecurity: Not on file   Transportation Needs: Not on file   Physical Activity: Not on file   Stress: Not on file   Social Connections: Not on file   Interpersonal Safety: Not on file   Housing Stability: Not on file            Past Medical History:     Past Medical History:   Diagnosis Date    Asthma     Coronary artery disease     cardiac cath 2011: JUDY to LAD and 1st diagonal    Fibromuscular dysplasia     GERD (gastroesophageal reflux disease)     Hyperlipidemia LDL goal <70     Hypertension     MI (myocardial infarction) (H)     Peripheral vascular disease     angioplasty bilat renal arteries    Renal artery stenosis     Bilateral moderate renal stenosis noted on 13 renal ultrasound    Sleep apnea     mild per sleep study [facility] 7-10-15              Past Surgical History:     Past Surgical History:   Procedure Laterality Date    COLONOSCOPY N/A 2015    Procedure: COLONOSCOPY;  Surgeon: Juan Miguel Garcia MD;  Location:  GI    GYN SURGERY      HYSTERECTOMY      partial              Allergies:    Imdur [isosorbide nitrate], Morphine sulfate, and Penicillins       Data:   All laboratory data reviewed:    Recent Labs   Lab Test 05/08/23  0932 01/31/22  0904 01/26/21  0000 12/30/19  0907   LDL 88 76 87 82   HDL 37* 38* 39 43*   NHDL 116 103  --  110   CHOL 153 141 148 153   TRIG 139 135 111 139       Lab Results   Component Value Date    WBC 6.0 03/26/2025    WBC 7.4 06/06/2021    RBC 5.08 03/26/2025    RBC 4.53 06/06/2021    HGB 13.7 03/26/2025    HGB 12.1 06/06/2021    HCT 43.0 03/26/2025    HCT 38.5 06/06/2021    MCV 85 03/26/2025    MCV 85 06/06/2021    MCH 27.0 03/26/2025    MCH 26.7 06/06/2021    MCHC 31.9 03/26/2025    MCHC 31.4 (L) 06/06/2021    RDW 13.3 03/26/2025    RDW 13.7 06/06/2021     03/26/2025     06/06/2021       Lab Results   Component Value Date     05/12/2025     06/06/2021    POTASSIUM 4.0 05/12/2025    POTASSIUM 3.3 (L) 01/31/2022    POTASSIUM 4.0 06/06/2021    CHLORIDE 103 05/12/2025    CHLORIDE 109 01/31/2022    CHLORIDE 108 06/06/2021    CO2 30 (H) 05/12/2025    CO2 26 01/31/2022    CO2 31 06/06/2021    ANIONGAP 9 05/12/2025    ANIONGAP 5 01/31/2022    ANIONGAP 3 06/06/2021    GLC 91 05/12/2025    GLC 90 01/31/2022    GLC 86 06/06/2021    BUN 11.6 05/12/2025    BUN 11 01/31/2022    BUN 14 06/06/2021    CR 0.94 05/12/2025    CR 0.89 06/06/2021    GFRESTIMATED 69 05/12/2025    GFRESTIMATED 72 06/06/2021    GFRESTBLACK 84 06/06/2021    ROSANNA 9.7 05/12/2025    ROSANNA 9.3 06/06/2021      Lab Results   Component Value Date    AST 32 03/26/2025    AST 21 06/06/2021    ALT 27 03/26/2025    ALT 25 06/06/2021       Lab Results   Component Value Date    A1C 5.8 (H) 10/21/2019       Lab Results   Component Value Date    INR 0.97 06/24/2014    INR 1.04 09/23/2013

## 2025-05-13 ENCOUNTER — TELEPHONE (OUTPATIENT)
Dept: CARDIOLOGY | Facility: CLINIC | Age: 60
End: 2025-05-13
Payer: COMMERCIAL

## 2025-05-13 ENCOUNTER — RESULTS FOLLOW-UP (OUTPATIENT)
Dept: CARDIOLOGY | Facility: CLINIC | Age: 60
End: 2025-05-13

## 2025-05-13 NOTE — TELEPHONE ENCOUNTER
Attempted to call patient to discuss recent lab work. No answer, detailed VM left that outlined that lab work is within normal limits. Pt encouraged to call back with any questions/concerns.

## 2025-06-12 ENCOUNTER — OFFICE VISIT (OUTPATIENT)
Dept: CARDIOLOGY | Facility: CLINIC | Age: 60
End: 2025-06-12
Attending: NURSE PRACTITIONER
Payer: COMMERCIAL

## 2025-06-12 VITALS
HEIGHT: 64 IN | BODY MASS INDEX: 37.05 KG/M2 | SYSTOLIC BLOOD PRESSURE: 123 MMHG | OXYGEN SATURATION: 98 % | HEART RATE: 55 BPM | WEIGHT: 217 LBS | DIASTOLIC BLOOD PRESSURE: 81 MMHG

## 2025-06-12 DIAGNOSIS — I70.1 RENAL ARTERY STENOSIS: ICD-10-CM

## 2025-06-12 DIAGNOSIS — I10 BENIGN ESSENTIAL HYPERTENSION: Primary | ICD-10-CM

## 2025-06-12 NOTE — PATIENT INSTRUCTIONS
Today's Plan:     - Continue all your current medications.   - Let us know BP is persistently > 140/80.   - If BP continues to be elevated, likely proceed with renal angiogram.   - Follow-up with Dr. Tobias in about 6 months, sooner if needed.      If you have questions or concerns please call my nurse team:  Amena RN ([phone]) Rayne RN ([phone]) or Carmen RN ([phone])    After Hours: [phone], option #2, ask for cardiologist on-call    Scheduling phone number: [phone]    Reminder: Please bring in all current medications, over the counter supplements and vitamin bottles to your next appointment.-    It was a pleasure seeing you today!     Zuleima Baumann, MARY CARMEN  6/12/2025    -

## 2025-06-12 NOTE — PROGRESS NOTES
Cardiology Clinic Progress Note  Glenis Ni MRN# [id number]   YOB: 1965 Age: 59 year old     Primary cardiologist: Dr. Tobias    Reason for visit: hypertension     History of presenting illness:    Glenis Ni is a pleasant 59 year old patient with past medical history significant for CAD s/p PCI of LAD in 2011, longstanding hypertension, known renal artery fibromuscular dysplasia s/p prior renal angioplasties.     Her last coronary angiogram was in June 2014 at which point she had no significant obstructive disease.  In October 2024 she noted some exertional chest discomfort and underwent exercise nuclear stress test that was negative for inducible ischemia.     She has been seen by Dr. Taveras for evaluation of renal artery stenosis. Renal artery US in 2019 demonstrated elevated velocities in the right renal artery.  At that time, her blood pressure and renal function were within normal limits so medical management was recommended.     In May of 2024,  at the time her blood pressure was elevated but given that this was an isolated reading and her kidney function was normal, continued medical management was again recommended.  Plan was to pursue angiogram and repeat angioplasty in the setting of known FMD and elevated velocities if her blood pressure remained persistently elevated.     At the time of her last visit about a month ago, her blood pressure was elevated though she had not taken any of her medications.  She has been managed on amlodipine 10 mg daily, losartan 100 mg daily, spironolactone 12.5 mg daily, and Toprol-XL 25 mg daily.    Today she returns for follow-up.  Her blood pressure looks excellent she tells me her home blood pressure readings have been running a little higher around 150s over 90s despite her medications.  She has noticed occasional shortness of breath though contributes this to inactivity and poor air quality recently.  She otherwise denies chest pain, syncope or  near syncope, or palpitations.  No PND or orthopnea.    Her most recent BMP shows normal renal function and electrolytes.  Lipid panel with LDL of 86, HDL 37, triglycerides 133.            Assessment and Plan:     ASSESSMENT: Pertinent issues addressed at this visit     Longstanding hypertension.  Her blood pressure looks excellent today, though she reports home blood pressure readings have been on the higher side over the last week or so, ranging 150-160/'s despite medications. Managed amlodipine, losartan, Toprol-XL, and spironolactone, as above.  She is compliant with her medications.   Renal fibromuscular dysplasia.  Status post bilateral balloon angioplasty in December 2011.  She was noted to have elevated velocities in the right renal artery on ultrasound 5/2023.  She has been followed by Dr. Taveras, given adequate blood pressure control and normal renal function, medical management has been recommended.    Dyslipidemia.  Well-controlled on high intensity statin and ezetimibe.  Coronary disease s/p PCI to LAD (2011).  Stress test 11/2024 negative for ischemia. On ASA and statin therapy.  No angina.       PLAN:     Overall Glenis appears doing well from a cardiac standpoint, she has no exertional symptoms.  I have instructed her to continue taking her home blood pressure after her medications, if persistently elevated, I may refer her back to Dr. Taveras for consideration of renal artery angiogram and angioplasty.  Assuming she remains stable, follow-up Dr. Tobias in about 6 months.       Zuleima Baumann, ISAURA, APRN, CNP  Page: [phone] (8a-5p M-F)    Orders this Visit:  Orders Placed This Encounter   Procedures    Follow-Up with Cardiology     No orders of the defined types were placed in this encounter.    There are no discontinued medications.    Today's clinic visit entailed:  Review of the result(s) of each unique test - echo, labs, EKG, stress test  Ordering of each unique test  Prescription drug  "management  35 minutes spent by me on the date of the encounter doing chart review, review of test results, interpretation of tests, patient visit, and documentation   Provider  Link to [facility] Help Grid     The level of medical decision making during this visit was of moderate complexity.           Review of Systems:     Review of Systems:  Skin:  not assessed     Eyes:  not assessed    ENT:  not assessed    Respiratory:  Negative    Cardiovascular:    Positive for  Gastroenterology: not assessed    Genitourinary:  not assessed    Musculoskeletal:  not assessed    Neurologic:  not assessed    Psychiatric:  not assessed    Heme/Lymph/Imm:  not assessed    Endocrine:  not assessed              Physical Exam:   Vitals: /81   Pulse 55   Ht 1.626 m (5' 4\")   Wt 98.4 kg (217 lb)   SpO2 98%   BMI 37.25 kg/m    Constitutional:  cooperative        Skin:  warm and dry to the touch        Head:  normocephalic        Eyes:  pupils equal and round        ENT:  not assessed this visit        Neck:  JVP normal        Chest:  normal breath sounds, clear to auscultation, normal A-P diameter, normal symmetry, normal respiratory excursion, no use of accessory muscles        Cardiac: regular rhythm, normal S1 and S2, no murmurs, gallops or rubs detected                  Abdomen:  abdomen soft        Vascular: pulses full and equal                                      Extremities and Back:  no edema        Neurological:  no gross motor deficits, affect appropriate             Medications:     Current Outpatient Medications   Medication Sig Dispense Refill    albuterol (PROAIR HFA/PROVENTIL HFA/VENTOLIN HFA) 108 (90 Base) MCG/ACT inhaler Inhale 1-2 puffs into the lungs every 4 hours as needed for shortness of breath / dyspnea or wheezing      amLODIPine (NORVASC) 10 MG tablet Take 1 tablet (10 mg) by mouth daily. 90 tablet 3    aspirin 81 MG EC tablet Take 1 tablet (81 mg) by mouth daily 90 tablet 3    ezetimibe (ZETIA) 10 MG " tablet Take 1 tablet (10 mg) by mouth daily. 90 tablet 3    losartan (COZAAR) 100 MG tablet Take 1 tablet (100 mg) by mouth daily 90 tablet 3    metoprolol succinate ER (TOPROL XL) 25 MG 24 hr tablet Take 1 tablet (25 mg) by mouth daily 90 tablet 4    nitroGLYcerin (NITROSTAT) 0.4 MG sublingual tablet Place 1 tablet (0.4 mg) under the tongue every 5 minutes as needed for chest pain. 25 tablet 3    omeprazole (PRILOSEC) 20 MG DR capsule Take 20 mg by mouth daily      rosuvastatin (CRESTOR) 40 MG tablet Take 1 tablet (40 mg) by mouth daily. 90 tablet 3    spironolactone (ALDACTONE) 25 MG tablet Take 0.5 tablets (12.5 mg) by mouth daily. 45 tablet 3    UNABLE TO FIND MEDICATION NAME: Sea young (Patient not taking: Reported on 2025)         Family History   Problem Relation Age of Onset    Heart Disease Mother     Heart Disease Sister        Social History     Socioeconomic History    Marital status: Single     Spouse name: Not on file    Number of children: Not on file    Years of education: Not on file    Highest education level: Not on file   Occupational History    Not on file   Tobacco Use    Smoking status: Former     Current packs/day: 0.00     Average packs/day: 1 pack/day for 20.0 years (20.0 ttl pk-yrs)     Types: Cigarettes     Start date:      Quit date:      Years since quittin.4    Smokeless tobacco: Former     Quit date: 1995   Substance and Sexual Activity    Alcohol use: Yes     Comment: wine on rare occasions     Drug use: No    Sexual activity: Not on file   Other Topics Concern    Parent/sibling w/ CABG, MI or angioplasty before 65F 55M? Not Asked     Service Not Asked    Blood Transfusions Not Asked    Caffeine Concern Not Asked    Occupational Exposure Not Asked    Hobby Hazards Not Asked    Sleep Concern No    Stress Concern No    Weight Concern Not Asked    Special Diet Not Asked    Back Care Not Asked    Exercise Yes     Comment: on occasion    Bike Helmet Not  Asked    Seat Belt Not Asked    Self-Exams Not Asked   Social History Narrative    Not on file     Social Drivers of Health     Financial Resource Strain: Not on file   Food Insecurity: Not on file   Transportation Needs: Not on file   Physical Activity: Not on file   Stress: Not on file   Social Connections: Not on file   Interpersonal Safety: Not on file   Housing Stability: Not on file            Past Medical History:     Past Medical History:   Diagnosis Date    Asthma     Coronary artery disease     cardiac cath 2011: JUDY to LAD and 1st diagonal    Fibromuscular dysplasia     GERD (gastroesophageal reflux disease)     Hyperlipidemia LDL goal <70     Hypertension     MI (myocardial infarction) (H) 8/11    Peripheral vascular disease 7/12    angioplasty bilat renal arteries    Renal artery stenosis     Bilateral moderate renal stenosis noted on 12/20/13 renal ultrasound    Sleep apnea     mild per sleep study [facility] 7-10-15              Past Surgical History:     Past Surgical History:   Procedure Laterality Date    COLONOSCOPY N/A 1/13/2015    Procedure: COLONOSCOPY;  Surgeon: Juan Miguel Garcia MD;  Location:  GI    GYN SURGERY      HYSTERECTOMY      partial              Allergies:   Imdur [isosorbide nitrate], Morphine sulfate, and Penicillins       Data:   All laboratory data reviewed:    Recent Labs   Lab Test 05/12/25  1144 05/08/23  0932 01/31/22  0904   LDL 86 88 76   HDL 37* 37* 38*   NHDL 113 116 103   CHOL 150 153 141   TRIG 133 139 135       Lab Results   Component Value Date    WBC 6.0 03/26/2025    WBC 7.4 06/06/2021    RBC 5.08 03/26/2025    RBC 4.53 06/06/2021    HGB 13.7 03/26/2025    HGB 12.1 06/06/2021    HCT 43.0 03/26/2025    HCT 38.5 06/06/2021    MCV 85 03/26/2025    MCV 85 06/06/2021    MCH 27.0 03/26/2025    MCH 26.7 06/06/2021    MCHC 31.9 03/26/2025    MCHC 31.4 (L) 06/06/2021    RDW 13.3 03/26/2025    RDW 13.7 06/06/2021     03/26/2025     06/06/2021        Lab Results   Component Value Date     05/12/2025     06/06/2021    POTASSIUM 4.0 05/12/2025    POTASSIUM 3.3 (L) 01/31/2022    POTASSIUM 4.0 06/06/2021    CHLORIDE 103 05/12/2025    CHLORIDE 109 01/31/2022    CHLORIDE 108 06/06/2021    CO2 30 (H) 05/12/2025    CO2 26 01/31/2022    CO2 31 06/06/2021    ANIONGAP 9 05/12/2025    ANIONGAP 5 01/31/2022    ANIONGAP 3 06/06/2021    GLC 91 05/12/2025    GLC 90 01/31/2022    GLC 86 06/06/2021    BUN 11.6 05/12/2025    BUN 11 01/31/2022    BUN 14 06/06/2021    CR 0.94 05/12/2025    CR 0.89 06/06/2021    GFRESTIMATED 69 05/12/2025    GFRESTIMATED 72 06/06/2021    GFRESTBLACK 84 06/06/2021    ROSANNA 9.7 05/12/2025    ROSANNA 9.3 06/06/2021      Lab Results   Component Value Date    AST 32 03/26/2025    AST 21 06/06/2021    ALT 27 03/26/2025    ALT 25 06/06/2021       Lab Results   Component Value Date    A1C 5.8 (H) 10/21/2019       Lab Results   Component Value Date    INR 0.97 06/24/2014    INR 1.04 09/23/2013

## 2025-06-12 NOTE — LETTER
6/12/2025    Charisse Stasdesmondsagar   [facility] [address] [place] [address]    RE: Glenis Ni       Dear Colleague,     I had the pleasure of seeing Glenis Ni in the [facility].  Cardiology Clinic Progress Note  Glenis Ni MRN# [id number]   YOB: 1965 Age: 59 year old     Primary cardiologist: Dr. Tobias    Reason for visit: hypertension     History of presenting illness:    Glenis Ni is a pleasant 59 year old patient with past medical history significant for CAD s/p PCI of LAD in 2011, longstanding hypertension, known renal artery fibromuscular dysplasia s/p prior renal angioplasties.     Her last coronary angiogram was in June 2014 at which point she had no significant obstructive disease.  In October 2024 she noted some exertional chest discomfort and underwent exercise nuclear stress test that was negative for inducible ischemia.     She has been seen by Dr. Taveras for evaluation of renal artery stenosis. Renal artery US in 2019 demonstrated elevated velocities in the right renal artery.  At that time, her blood pressure and renal function were within normal limits so medical management was recommended.     In May of 2024,  at the time her blood pressure was elevated but given that this was an isolated reading and her kidney function was normal, continued medical management was again recommended.  Plan was to pursue angiogram and repeat angioplasty in the setting of known FMD and elevated velocities if her blood pressure remained persistently elevated.     At the time of her last visit about a month ago, her blood pressure was elevated though she had not taken any of her medications.  She has been managed on amlodipine 10 mg daily, losartan 100 mg daily, spironolactone 12.5 mg daily, and Toprol-XL 25 mg daily.    Today she returns for follow-up.  Her blood pressure looks excellent she tells me her home blood pressure readings have  been running a little higher around 150s over 90s despite her medications.  She has noticed occasional shortness of breath though contributes this to inactivity and poor air quality recently.  She otherwise denies chest pain, syncope or near syncope, or palpitations.  No PND or orthopnea.    Her most recent BMP shows normal renal function and electrolytes.  Lipid panel with LDL of 86, HDL 37, triglycerides 133.            Assessment and Plan:     ASSESSMENT: Pertinent issues addressed at this visit     Longstanding hypertension.  Her blood pressure looks excellent today, though she reports home blood pressure readings have been on the higher side over the last week or so, ranging 150-160/'s despite medications. Managed amlodipine, losartan, Toprol-XL, and spironolactone, as above.  She is compliant with her medications.   Renal fibromuscular dysplasia.  Status post bilateral balloon angioplasty in December 2011.  She was noted to have elevated velocities in the right renal artery on ultrasound 5/2023.  She has been followed by Dr. Taveras, given adequate blood pressure control and normal renal function, medical management has been recommended.    Dyslipidemia.  Well-controlled on high intensity statin and ezetimibe.  Coronary disease s/p PCI to LAD (2011).  Stress test 11/2024 negative for ischemia. On ASA and statin therapy.  No angina.       PLAN:     Overall Glenis appears doing well from a cardiac standpoint, she has no exertional symptoms.  I have instructed her to continue taking her home blood pressure after her medications, if persistently elevated, I may refer her back to Dr. Taveras for consideration of renal artery angiogram and angioplasty.  Assuming she remains stable, follow-up Dr. Tobias in about 6 months.       Zuleima Baumann, ISAURA, APRN, CNP  Page: [phone] (8a-5p M-F)    Orders this Visit:  Orders Placed This Encounter   Procedures     Follow-Up with Cardiology     No orders of the defined types  "were placed in this encounter.    There are no discontinued medications.    Today's clinic visit entailed:  Review of the result(s) of each unique test - echo, labs, EKG, stress test  Ordering of each unique test  Prescription drug management  35 minutes spent by me on the date of the encounter doing chart review, review of test results, interpretation of tests, patient visit, and documentation   Provider  Link to [facility] Help Grid     The level of medical decision making during this visit was of moderate complexity.           Review of Systems:     Review of Systems:  Skin:  not assessed     Eyes:  not assessed    ENT:  not assessed    Respiratory:  Negative    Cardiovascular:    Positive for  Gastroenterology: not assessed    Genitourinary:  not assessed    Musculoskeletal:  not assessed    Neurologic:  not assessed    Psychiatric:  not assessed    Heme/Lymph/Imm:  not assessed    Endocrine:  not assessed              Physical Exam:   Vitals: /81   Pulse 55   Ht 1.626 m (5' 4\")   Wt 98.4 kg (217 lb)   SpO2 98%   BMI 37.25 kg/m    Constitutional:  cooperative        Skin:  warm and dry to the touch        Head:  normocephalic        Eyes:  pupils equal and round        ENT:  not assessed this visit        Neck:  JVP normal        Chest:  normal breath sounds, clear to auscultation, normal A-P diameter, normal symmetry, normal respiratory excursion, no use of accessory muscles        Cardiac: regular rhythm, normal S1 and S2, no murmurs, gallops or rubs detected                  Abdomen:  abdomen soft        Vascular: pulses full and equal                                      Extremities and Back:  no edema        Neurological:  no gross motor deficits, affect appropriate             Medications:     Current Outpatient Medications   Medication Sig Dispense Refill     albuterol (PROAIR HFA/PROVENTIL HFA/VENTOLIN HFA) 108 (90 Base) MCG/ACT inhaler Inhale 1-2 puffs into the lungs every 4 hours as needed for " shortness of breath / dyspnea or wheezing       amLODIPine (NORVASC) 10 MG tablet Take 1 tablet (10 mg) by mouth daily. 90 tablet 3     aspirin 81 MG EC tablet Take 1 tablet (81 mg) by mouth daily 90 tablet 3     ezetimibe (ZETIA) 10 MG tablet Take 1 tablet (10 mg) by mouth daily. 90 tablet 3     losartan (COZAAR) 100 MG tablet Take 1 tablet (100 mg) by mouth daily 90 tablet 3     metoprolol succinate ER (TOPROL XL) 25 MG 24 hr tablet Take 1 tablet (25 mg) by mouth daily 90 tablet 4     nitroGLYcerin (NITROSTAT) 0.4 MG sublingual tablet Place 1 tablet (0.4 mg) under the tongue every 5 minutes as needed for chest pain. 25 tablet 3     omeprazole (PRILOSEC) 20 MG DR capsule Take 20 mg by mouth daily       rosuvastatin (CRESTOR) 40 MG tablet Take 1 tablet (40 mg) by mouth daily. 90 tablet 3     spironolactone (ALDACTONE) 25 MG tablet Take 0.5 tablets (12.5 mg) by mouth daily. 45 tablet 3     UNABLE TO FIND MEDICATION NAME: Sea young (Patient not taking: Reported on 2025)         Family History   Problem Relation Age of Onset     Heart Disease Mother      Heart Disease Sister        Social History     Socioeconomic History     Marital status: Single     Spouse name: Not on file     Number of children: Not on file     Years of education: Not on file     Highest education level: Not on file   Occupational History     Not on file   Tobacco Use     Smoking status: Former     Current packs/day: 0.00     Average packs/day: 1 pack/day for 20.0 years (20.0 ttl pk-yrs)     Types: Cigarettes     Start date:      Quit date:      Years since quittin.4     Smokeless tobacco: Former     Quit date: 1995   Substance and Sexual Activity     Alcohol use: Yes     Comment: wine on rare occasions      Drug use: No     Sexual activity: Not on file   Other Topics Concern     Parent/sibling w/ CABG, MI or angioplasty before 65F 55M? Not Asked      Service Not Asked     Blood Transfusions Not Asked     Caffeine  Concern Not Asked     Occupational Exposure Not Asked     Hobby Hazards Not Asked     Sleep Concern No     Stress Concern No     Weight Concern Not Asked     Special Diet Not Asked     Back Care Not Asked     Exercise Yes     Comment: on occasion     Bike Helmet Not Asked     Seat Belt Not Asked     Self-Exams Not Asked   Social History Narrative     Not on file     Social Drivers of Health     Financial Resource Strain: Not on file   Food Insecurity: Not on file   Transportation Needs: Not on file   Physical Activity: Not on file   Stress: Not on file   Social Connections: Not on file   Interpersonal Safety: Not on file   Housing Stability: Not on file            Past Medical History:     Past Medical History:   Diagnosis Date     Asthma      Coronary artery disease     cardiac cath 2011: JUDY to LAD and 1st diagonal     Fibromuscular dysplasia      GERD (gastroesophageal reflux disease)      Hyperlipidemia LDL goal <70      Hypertension      MI (myocardial infarction) (H) 8/11     Peripheral vascular disease 7/12    angioplasty bilat renal arteries     Renal artery stenosis     Bilateral moderate renal stenosis noted on 12/20/13 renal ultrasound     Sleep apnea     mild per sleep study [facility] 7-10-15              Past Surgical History:     Past Surgical History:   Procedure Laterality Date     COLONOSCOPY N/A 1/13/2015    Procedure: COLONOSCOPY;  Surgeon: Juan Miguel Garcia MD;  Location:  GI     GYN SURGERY       HYSTERECTOMY      partial              Allergies:   Imdur [isosorbide nitrate], Morphine sulfate, and Penicillins       Data:   All laboratory data reviewed:    Recent Labs   Lab Test 05/12/25  1144 05/08/23  0932 01/31/22  0904   LDL 86 88 76   HDL 37* 37* 38*   NHDL 113 116 103   CHOL 150 153 141   TRIG 133 139 135       Lab Results   Component Value Date    WBC 6.0 03/26/2025    WBC 7.4 06/06/2021    RBC 5.08 03/26/2025    RBC 4.53 06/06/2021    HGB 13.7 03/26/2025    HGB 12.1 06/06/2021     HCT 43.0 03/26/2025    HCT 38.5 06/06/2021    MCV 85 03/26/2025    MCV 85 06/06/2021    MCH 27.0 03/26/2025    MCH 26.7 06/06/2021    MCHC 31.9 03/26/2025    MCHC 31.4 (L) 06/06/2021    RDW 13.3 03/26/2025    RDW 13.7 06/06/2021     03/26/2025     06/06/2021       Lab Results   Component Value Date     05/12/2025     06/06/2021    POTASSIUM 4.0 05/12/2025    POTASSIUM 3.3 (L) 01/31/2022    POTASSIUM 4.0 06/06/2021    CHLORIDE 103 05/12/2025    CHLORIDE 109 01/31/2022    CHLORIDE 108 06/06/2021    CO2 30 (H) 05/12/2025    CO2 26 01/31/2022    CO2 31 06/06/2021    ANIONGAP 9 05/12/2025    ANIONGAP 5 01/31/2022    ANIONGAP 3 06/06/2021    GLC 91 05/12/2025    GLC 90 01/31/2022    GLC 86 06/06/2021    BUN 11.6 05/12/2025    BUN 11 01/31/2022    BUN 14 06/06/2021    CR 0.94 05/12/2025    CR 0.89 06/06/2021    GFRESTIMATED 69 05/12/2025    GFRESTIMATED 72 06/06/2021    GFRESTBLACK 84 06/06/2021    ROSANNA 9.7 05/12/2025    ROSANNA 9.3 06/06/2021      Lab Results   Component Value Date    AST 32 03/26/2025    AST 21 06/06/2021    ALT 27 03/26/2025    ALT 25 06/06/2021       Lab Results   Component Value Date    A1C 5.8 (H) 10/21/2019       Lab Results   Component Value Date    INR 0.97 06/24/2014    INR 1.04 09/23/2013         Thank you for allowing me to participate in the care of your patient.      Sincerely,     Zuleima Baumann, MARY CARMEN     [facility]  cc:   Zuleima Baumann, CNP  [address] [place] [address]

## 2025-06-17 DIAGNOSIS — I10 BENIGN ESSENTIAL HYPERTENSION: ICD-10-CM

## 2025-06-17 DIAGNOSIS — I70.1 RENAL ARTERY STENOSIS: ICD-10-CM

## 2025-06-17 RX ORDER — LOSARTAN POTASSIUM 100 MG/1
100 TABLET ORAL DAILY
Qty: 100 TABLET | Refills: 3 | Status: SHIPPED | OUTPATIENT
Start: 2025-06-17

## 2025-06-17 RX ORDER — SPIRONOLACTONE 25 MG/1
12.5 TABLET ORAL DAILY
Qty: 50 TABLET | Refills: 3 | Status: SHIPPED | OUTPATIENT
Start: 2025-06-17

## 2025-08-21 DIAGNOSIS — E66.01 MORBID OBESITY (H): ICD-10-CM

## 2025-08-21 DIAGNOSIS — I25.10 CORONARY ARTERY DISEASE INVOLVING NATIVE CORONARY ARTERY OF NATIVE HEART WITHOUT ANGINA PECTORIS: ICD-10-CM

## 2025-08-21 DIAGNOSIS — I25.110 CORONARY ARTERY DISEASE INVOLVING NATIVE CORONARY ARTERY OF NATIVE HEART WITH UNSTABLE ANGINA PECTORIS (H): ICD-10-CM

## 2025-08-21 DIAGNOSIS — E78.2 MIXED HYPERLIPIDEMIA: ICD-10-CM

## 2025-08-21 DIAGNOSIS — I70.1 RENAL ARTERY STENOSIS: ICD-10-CM

## 2025-08-21 DIAGNOSIS — I10 BENIGN ESSENTIAL HYPERTENSION: ICD-10-CM

## 2025-08-21 DIAGNOSIS — I77.3 FIBROMUSCULAR DYSPLASIA: ICD-10-CM

## 2025-08-21 RX ORDER — METOPROLOL SUCCINATE 25 MG/1
25 TABLET, EXTENDED RELEASE ORAL DAILY
Qty: 90 TABLET | Refills: 3 | Status: SHIPPED | OUTPATIENT
Start: 2025-08-21